# Patient Record
Sex: MALE | Race: OTHER | Employment: FULL TIME
[De-identification: names, ages, dates, MRNs, and addresses within clinical notes are randomized per-mention and may not be internally consistent; named-entity substitution may affect disease eponyms.]

---

## 2017-01-10 ENCOUNTER — OFFICE VISIT (OUTPATIENT)
Dept: FAMILY MEDICINE CLINIC | Facility: CLINIC | Age: 57
End: 2017-01-10

## 2017-01-10 VITALS
DIASTOLIC BLOOD PRESSURE: 86 MMHG | RESPIRATION RATE: 17 BRPM | WEIGHT: 300 LBS | HEART RATE: 78 BPM | BODY MASS INDEX: 47.09 KG/M2 | TEMPERATURE: 98.7 F | HEIGHT: 67 IN | SYSTOLIC BLOOD PRESSURE: 132 MMHG | OXYGEN SATURATION: 95 %

## 2017-01-10 DIAGNOSIS — S93.401A SPRAIN OF RIGHT ANKLE, UNSPECIFIED LIGAMENT, INITIAL ENCOUNTER: ICD-10-CM

## 2017-01-10 DIAGNOSIS — S93.601A FOOT SPRAIN, RIGHT, INITIAL ENCOUNTER: ICD-10-CM

## 2017-01-10 DIAGNOSIS — M79.671 RIGHT FOOT PAIN: Primary | ICD-10-CM

## 2017-01-10 DIAGNOSIS — M25.571 ACUTE RIGHT ANKLE PAIN: ICD-10-CM

## 2017-01-10 PROCEDURE — 99213 OFFICE O/P EST LOW 20 MIN: CPT | Performed by: FAMILY MEDICINE

## 2017-01-10 PROCEDURE — 73610 X-RAY EXAM OF ANKLE: CPT | Performed by: FAMILY MEDICINE

## 2017-01-10 ASSESSMENT — ENCOUNTER SYMPTOMS
VOMITING: 0
ABDOMINAL PAIN: 0
SORE THROAT: 0
SINUS PRESSURE: 0
CHEST TIGHTNESS: 0
SHORTNESS OF BREATH: 0
RHINORRHEA: 0
EYE PAIN: 0
BACK PAIN: 0
COUGH: 0
EYE DISCHARGE: 0
NAUSEA: 0
DIARRHEA: 0
COLOR CHANGE: 0

## 2017-01-19 ENCOUNTER — TELEPHONE (OUTPATIENT)
Dept: FAMILY MEDICINE CLINIC | Facility: CLINIC | Age: 57
End: 2017-01-19

## 2017-01-19 DIAGNOSIS — M19.90 ARTHRITIS: Primary | ICD-10-CM

## 2017-01-19 RX ORDER — MELOXICAM 15 MG/1
15 TABLET ORAL DAILY
Qty: 30 TABLET | Refills: 1 | Status: SHIPPED | OUTPATIENT
Start: 2017-01-19 | End: 2021-10-28 | Stop reason: ALTCHOICE

## 2017-12-18 ENCOUNTER — HOSPITAL ENCOUNTER (OUTPATIENT)
Age: 57
Discharge: HOME | End: 2017-12-18
Payer: COMMERCIAL

## 2017-12-18 ENCOUNTER — HOSPITAL ENCOUNTER (OUTPATIENT)
Dept: GENERAL RADIOLOGY | Age: 57
Discharge: HOME | End: 2017-12-18
Payer: COMMERCIAL

## 2017-12-18 DIAGNOSIS — E88.81 INSULIN RESISTANCE: ICD-10-CM

## 2017-12-18 LAB
ABSOLUTE EOS #: 0.13 K/UL (ref 0–0.44)
ABSOLUTE IMMATURE GRANULOCYTE: <0.03 K/UL (ref 0–0.3)
ABSOLUTE LYMPH #: 1.43 K/UL (ref 1.1–3.7)
ABSOLUTE MONO #: 0.43 K/UL (ref 0.1–1.2)
ALBUMIN SERPL-MCNC: 4.3 G/DL (ref 3.5–5.2)
ALBUMIN/GLOBULIN RATIO: 1.3 (ref 1–2.5)
ALP BLD-CCNC: 65 U/L (ref 40–129)
ALT SERPL-CCNC: 41 U/L (ref 5–41)
ANION GAP SERPL CALCULATED.3IONS-SCNC: 9 MMOL/L (ref 9–17)
AST SERPL-CCNC: 36 U/L
BASOPHILS # BLD: 0 % (ref 0–2)
BASOPHILS ABSOLUTE: <0.03 K/UL (ref 0–0.2)
BILIRUB SERPL-MCNC: 0.71 MG/DL (ref 0.3–1.2)
BILIRUBIN URINE: NEGATIVE
BUN BLDV-MCNC: 15 MG/DL (ref 6–20)
BUN/CREAT BLD: ABNORMAL (ref 9–20)
C-PEPTIDE: 9.9 NG/ML (ref 1.1–4.4)
CALCIUM SERPL-MCNC: 9.3 MG/DL (ref 8.6–10.4)
CHLORIDE BLD-SCNC: 101 MMOL/L (ref 98–107)
CHOLESTEROL/HDL RATIO: 4
CHOLESTEROL: 143 MG/DL
CO2: 31 MMOL/L (ref 20–31)
COLOR: YELLOW
COMMENT UA: NORMAL
CREAT SERPL-MCNC: 0.87 MG/DL (ref 0.7–1.2)
CREATININE URINE: 235.4 MG/DL (ref 39–259)
DIFFERENTIAL TYPE: ABNORMAL
EOSINOPHILS RELATIVE PERCENT: 2 % (ref 1–4)
ESTIMATED AVERAGE GLUCOSE: 103 MG/DL
GFR AFRICAN AMERICAN: >60 ML/MIN
GFR NON-AFRICAN AMERICAN: >60 ML/MIN
GFR SERPL CREATININE-BSD FRML MDRD: ABNORMAL ML/MIN/{1.73_M2}
GFR SERPL CREATININE-BSD FRML MDRD: ABNORMAL ML/MIN/{1.73_M2}
GLUCOSE BLD-MCNC: 109 MG/DL (ref 70–99)
GLUCOSE URINE: NEGATIVE
HBA1C MFR BLD: 5.2 % (ref 4–6)
HCT VFR BLD CALC: 45.7 % (ref 40.7–50.3)
HDLC SERPL-MCNC: 36 MG/DL
HEMOGLOBIN: 15.9 G/DL (ref 13–17)
IMMATURE GRANULOCYTES: 0 %
INSULIN COMMENT: NORMAL
INSULIN REFERENCE RANGE:: NORMAL
INSULIN: 69.8 MU/L
IRON SATURATION: 34 % (ref 20–55)
IRON: 104 UG/DL (ref 59–158)
KETONES, URINE: NEGATIVE
LDL CHOLESTEROL: 75 MG/DL (ref 0–130)
LEUKOCYTE ESTERASE, URINE: NEGATIVE
LYMPHOCYTES # BLD: 26 % (ref 24–43)
MCH RBC QN AUTO: 33.7 PG (ref 25.2–33.5)
MCHC RBC AUTO-ENTMCNC: 34.8 G/DL (ref 28.4–34.8)
MCV RBC AUTO: 96.8 FL (ref 82.6–102.9)
MICROALBUMIN/CREAT 24H UR: 42 MG/L
MICROALBUMIN/CREAT UR-RTO: 18 MCG/MG CREAT
MONOCYTES # BLD: 8 % (ref 3–12)
NITRITE, URINE: NEGATIVE
PDW BLD-RTO: 13.2 % (ref 11.8–14.4)
PH UA: 5.5 (ref 5–8)
PLATELET # BLD: 175 K/UL (ref 138–453)
PLATELET ESTIMATE: ABNORMAL
PMV BLD AUTO: 9.7 FL (ref 8.1–13.5)
POTASSIUM SERPL-SCNC: 4.7 MMOL/L (ref 3.7–5.3)
PROSTATE SPECIFIC ANTIGEN: 0.19 UG/L
PROTEIN UA: NEGATIVE
RBC # BLD: 4.72 M/UL (ref 4.21–5.77)
RBC # BLD: ABNORMAL 10*6/UL
SEG NEUTROPHILS: 64 % (ref 36–65)
SEGMENTED NEUTROPHILS ABSOLUTE COUNT: 3.48 K/UL (ref 1.5–8.1)
SODIUM BLD-SCNC: 141 MMOL/L (ref 135–144)
SPECIFIC GRAVITY UA: 1.02 (ref 1–1.03)
T3 FREE: 3.37 PG/ML (ref 2.02–4.43)
THYROXINE, FREE: 0.83 NG/DL (ref 0.93–1.7)
TOTAL IRON BINDING CAPACITY: 310 UG/DL (ref 250–450)
TOTAL PROTEIN: 7.6 G/DL (ref 6.4–8.3)
TRIGL SERPL-MCNC: 161 MG/DL
TSH SERPL DL<=0.05 MIU/L-ACNC: 2.44 MIU/L (ref 0.3–5)
TURBIDITY: CLEAR
UNSATURATED IRON BINDING CAPACITY: 206 UG/DL (ref 112–347)
URINE HGB: NEGATIVE
UROBILINOGEN, URINE: NORMAL
VITAMIN D 25-HYDROXY: 23.8 NG/ML (ref 30–100)
VLDLC SERPL CALC-MCNC: ABNORMAL MG/DL (ref 1–30)
WBC # BLD: 5.5 K/UL (ref 3.5–11.3)
WBC # BLD: ABNORMAL 10*3/UL

## 2017-12-18 PROCEDURE — 84681 ASSAY OF C-PEPTIDE: CPT

## 2017-12-18 PROCEDURE — 80053 COMPREHEN METABOLIC PANEL: CPT

## 2017-12-18 PROCEDURE — 84443 ASSAY THYROID STIM HORMONE: CPT

## 2017-12-18 PROCEDURE — 71020 XR CHEST STANDARD TWO VW: CPT

## 2017-12-18 PROCEDURE — 85025 COMPLETE CBC W/AUTO DIFF WBC: CPT

## 2017-12-18 PROCEDURE — 83540 ASSAY OF IRON: CPT

## 2017-12-18 PROCEDURE — G0103 PSA SCREENING: HCPCS

## 2017-12-18 PROCEDURE — 82306 VITAMIN D 25 HYDROXY: CPT

## 2017-12-18 PROCEDURE — 83036 HEMOGLOBIN GLYCOSYLATED A1C: CPT

## 2017-12-18 PROCEDURE — 83525 ASSAY OF INSULIN: CPT

## 2017-12-18 PROCEDURE — 82570 ASSAY OF URINE CREATININE: CPT

## 2017-12-18 PROCEDURE — 84481 FREE ASSAY (FT-3): CPT

## 2017-12-18 PROCEDURE — 80061 LIPID PANEL: CPT

## 2017-12-18 PROCEDURE — 81003 URINALYSIS AUTO W/O SCOPE: CPT

## 2017-12-18 PROCEDURE — 84439 ASSAY OF FREE THYROXINE: CPT

## 2017-12-18 PROCEDURE — 36415 COLL VENOUS BLD VENIPUNCTURE: CPT

## 2017-12-18 PROCEDURE — 83550 IRON BINDING TEST: CPT

## 2017-12-18 PROCEDURE — 82043 UR ALBUMIN QUANTITATIVE: CPT

## 2018-01-15 ENCOUNTER — HOSPITAL ENCOUNTER (OUTPATIENT)
Dept: CT IMAGING | Age: 58
Discharge: HOME | End: 2018-01-15
Payer: COMMERCIAL

## 2018-01-15 DIAGNOSIS — R93.89 ABNORMAL RADIOLOGICAL FINDINGS IN SKIN AND SUBCUTANEOUS TISSUE: ICD-10-CM

## 2018-01-15 DIAGNOSIS — J21.8 ACUTE BRONCHIOLITIS DUE TO OTHER SPECIFIED ORGANISMS: ICD-10-CM

## 2018-01-15 PROCEDURE — 71250 CT THORAX DX C-: CPT

## 2019-02-16 ENCOUNTER — HOSPITAL ENCOUNTER (OUTPATIENT)
Age: 59
Discharge: HOME | End: 2019-02-18
Payer: COMMERCIAL

## 2019-02-16 ENCOUNTER — HOSPITAL ENCOUNTER (OUTPATIENT)
Dept: GENERAL RADIOLOGY | Age: 59
Discharge: HOME | End: 2019-02-18
Payer: COMMERCIAL

## 2019-02-16 ENCOUNTER — HOSPITAL ENCOUNTER (OUTPATIENT)
Age: 59
Discharge: HOME | End: 2019-02-16
Payer: COMMERCIAL

## 2019-02-16 DIAGNOSIS — M54.30 SCIATICA, UNSPECIFIED LATERALITY: ICD-10-CM

## 2019-02-16 LAB
-: ABNORMAL
ABSOLUTE EOS #: 0.14 K/UL (ref 0–0.44)
ABSOLUTE IMMATURE GRANULOCYTE: <0.03 K/UL (ref 0–0.3)
ABSOLUTE LYMPH #: 1.39 K/UL (ref 1.1–3.7)
ABSOLUTE MONO #: 0.6 K/UL (ref 0.1–1.2)
ALBUMIN SERPL-MCNC: 4.3 G/DL (ref 3.5–5.2)
ALBUMIN/GLOBULIN RATIO: 1.4 (ref 1–2.5)
ALP BLD-CCNC: 59 U/L (ref 40–129)
ALT SERPL-CCNC: 30 U/L (ref 5–41)
AMORPHOUS: ABNORMAL
ANION GAP SERPL CALCULATED.3IONS-SCNC: 13 MMOL/L (ref 9–17)
AST SERPL-CCNC: 28 U/L
BACTERIA: ABNORMAL
BASOPHILS # BLD: 0 % (ref 0–2)
BASOPHILS ABSOLUTE: <0.03 K/UL (ref 0–0.2)
BILIRUB SERPL-MCNC: 1.02 MG/DL (ref 0.3–1.2)
BILIRUBIN URINE: NEGATIVE
BUN BLDV-MCNC: 18 MG/DL (ref 6–20)
BUN/CREAT BLD: ABNORMAL (ref 9–20)
C-PEPTIDE: 6.9 NG/ML (ref 1.1–4.4)
CALCIUM SERPL-MCNC: 8.9 MG/DL (ref 8.6–10.4)
CASTS UA: ABNORMAL /LPF (ref 0–8)
CHLORIDE BLD-SCNC: 105 MMOL/L (ref 98–107)
CHOLESTEROL/HDL RATIO: 4.5
CHOLESTEROL: 150 MG/DL
CO2: 23 MMOL/L (ref 20–31)
COLOR: ABNORMAL
COMMENT UA: ABNORMAL
CREAT SERPL-MCNC: 0.82 MG/DL (ref 0.7–1.2)
CRYSTALS, UA: ABNORMAL /HPF
DIFFERENTIAL TYPE: ABNORMAL
EOSINOPHILS RELATIVE PERCENT: 2 % (ref 1–4)
EPITHELIAL CELLS UA: ABNORMAL /HPF (ref 0–5)
GFR AFRICAN AMERICAN: >60 ML/MIN
GFR NON-AFRICAN AMERICAN: >60 ML/MIN
GFR SERPL CREATININE-BSD FRML MDRD: ABNORMAL ML/MIN/{1.73_M2}
GFR SERPL CREATININE-BSD FRML MDRD: ABNORMAL ML/MIN/{1.73_M2}
GLUCOSE BLD-MCNC: 100 MG/DL (ref 70–99)
GLUCOSE URINE: NEGATIVE
HCT VFR BLD CALC: 45.9 % (ref 40.7–50.3)
HDLC SERPL-MCNC: 33 MG/DL
HEMOGLOBIN: 16.5 G/DL (ref 13–17)
IMMATURE GRANULOCYTES: 0 %
INSULIN COMMENT: NORMAL
INSULIN REFERENCE RANGE:: NORMAL
INSULIN: 37.3 MU/L
KETONES, URINE: NEGATIVE
LDL CHOLESTEROL: 92 MG/DL (ref 0–130)
LEUKOCYTE ESTERASE, URINE: ABNORMAL
LYMPHOCYTES # BLD: 23 % (ref 24–43)
MAGNESIUM: 2.2 MG/DL (ref 1.6–2.6)
MCH RBC QN AUTO: 34.3 PG (ref 25.2–33.5)
MCHC RBC AUTO-ENTMCNC: 35.9 G/DL (ref 28.4–34.8)
MCV RBC AUTO: 95.4 FL (ref 82.6–102.9)
MONOCYTES # BLD: 10 % (ref 3–12)
MUCUS: ABNORMAL
MYOGLOBIN: 109 NG/ML (ref 28–72)
NITRITE, URINE: POSITIVE
NRBC AUTOMATED: 0 PER 100 WBC
OTHER OBSERVATIONS UA: ABNORMAL
PDW BLD-RTO: 13.3 % (ref 11.8–14.4)
PH UA: 5 (ref 5–8)
PLATELET # BLD: 175 K/UL (ref 138–453)
PLATELET ESTIMATE: ABNORMAL
PMV BLD AUTO: 9.8 FL (ref 8.1–13.5)
POTASSIUM SERPL-SCNC: 4 MMOL/L (ref 3.7–5.3)
PROSTATE SPECIFIC ANTIGEN: 0.27 UG/L
PROTEIN UA: ABNORMAL
RBC # BLD: 4.81 M/UL (ref 4.21–5.77)
RBC # BLD: ABNORMAL 10*6/UL
RBC UA: ABNORMAL /HPF (ref 0–4)
RENAL EPITHELIAL, UA: ABNORMAL /HPF
SEG NEUTROPHILS: 65 % (ref 36–65)
SEGMENTED NEUTROPHILS ABSOLUTE COUNT: 3.8 K/UL (ref 1.5–8.1)
SODIUM BLD-SCNC: 141 MMOL/L (ref 135–144)
SPECIFIC GRAVITY UA: 1.03 (ref 1–1.03)
T3 FREE: 3.69 PG/ML (ref 2.02–4.43)
THYROXINE, FREE: 0.98 NG/DL (ref 0.93–1.7)
TOTAL CK: 347 U/L (ref 39–308)
TOTAL PROTEIN: 7.3 G/DL (ref 6.4–8.3)
TRICHOMONAS: ABNORMAL
TRIGL SERPL-MCNC: 126 MG/DL
TSH SERPL DL<=0.05 MIU/L-ACNC: 3.77 MIU/L (ref 0.3–5)
TURBIDITY: ABNORMAL
URINE HGB: ABNORMAL
UROBILINOGEN, URINE: NORMAL
VITAMIN D 25-HYDROXY: 19.4 NG/ML (ref 30–100)
VLDLC SERPL CALC-MCNC: ABNORMAL MG/DL (ref 1–30)
WBC # BLD: 6 K/UL (ref 3.5–11.3)
WBC # BLD: ABNORMAL 10*3/UL
WBC UA: ABNORMAL /HPF (ref 0–5)
YEAST: ABNORMAL

## 2019-02-16 PROCEDURE — 84439 ASSAY OF FREE THYROXINE: CPT

## 2019-02-16 PROCEDURE — 80061 LIPID PANEL: CPT

## 2019-02-16 PROCEDURE — 87186 SC STD MICRODIL/AGAR DIL: CPT

## 2019-02-16 PROCEDURE — 72100 X-RAY EXAM L-S SPINE 2/3 VWS: CPT

## 2019-02-16 PROCEDURE — 83735 ASSAY OF MAGNESIUM: CPT

## 2019-02-16 PROCEDURE — 82550 ASSAY OF CK (CPK): CPT

## 2019-02-16 PROCEDURE — 82306 VITAMIN D 25 HYDROXY: CPT

## 2019-02-16 PROCEDURE — 36415 COLL VENOUS BLD VENIPUNCTURE: CPT

## 2019-02-16 PROCEDURE — 84481 FREE ASSAY (FT-3): CPT

## 2019-02-16 PROCEDURE — 83525 ASSAY OF INSULIN: CPT

## 2019-02-16 PROCEDURE — 84153 ASSAY OF PSA TOTAL: CPT

## 2019-02-16 PROCEDURE — 80053 COMPREHEN METABOLIC PANEL: CPT

## 2019-02-16 PROCEDURE — 87077 CULTURE AEROBIC IDENTIFY: CPT

## 2019-02-16 PROCEDURE — 83036 HEMOGLOBIN GLYCOSYLATED A1C: CPT

## 2019-02-16 PROCEDURE — 84681 ASSAY OF C-PEPTIDE: CPT

## 2019-02-16 PROCEDURE — 85025 COMPLETE CBC W/AUTO DIFF WBC: CPT

## 2019-02-16 PROCEDURE — 83874 ASSAY OF MYOGLOBIN: CPT

## 2019-02-16 PROCEDURE — 87086 URINE CULTURE/COLONY COUNT: CPT

## 2019-02-16 PROCEDURE — 84443 ASSAY THYROID STIM HORMONE: CPT

## 2019-02-16 PROCEDURE — 81001 URINALYSIS AUTO W/SCOPE: CPT

## 2019-02-17 LAB
CULTURE: ABNORMAL
ESTIMATED AVERAGE GLUCOSE: 105 MG/DL
HBA1C MFR BLD: 5.3 % (ref 4–6)
Lab: ABNORMAL
SPECIMEN DESCRIPTION: ABNORMAL

## 2019-02-22 ENCOUNTER — HOSPITAL ENCOUNTER (OUTPATIENT)
Dept: NON INVASIVE DIAGNOSTICS | Age: 59
Discharge: HOME | End: 2019-02-22
Payer: COMMERCIAL

## 2019-02-22 ENCOUNTER — HOSPITAL ENCOUNTER (OUTPATIENT)
Dept: NUCLEAR MEDICINE | Age: 59
Discharge: HOME | End: 2019-02-24
Payer: COMMERCIAL

## 2019-02-22 DIAGNOSIS — R07.2 PRECORDIAL CHEST PAIN: ICD-10-CM

## 2019-02-22 LAB
LV EF: 49 %
LVEF MODALITY: NORMAL

## 2019-02-22 PROCEDURE — 6360000002 HC RX W HCPCS: Performed by: FAMILY MEDICINE

## 2019-02-22 PROCEDURE — 93017 CV STRESS TEST TRACING ONLY: CPT | Performed by: NURSE PRACTITIONER

## 2019-02-22 PROCEDURE — 3430000000 HC RX DIAGNOSTIC RADIOPHARMACEUTICAL: Performed by: FAMILY MEDICINE

## 2019-02-22 PROCEDURE — 2580000003 HC RX 258: Performed by: FAMILY MEDICINE

## 2019-02-22 PROCEDURE — A9500 TC99M SESTAMIBI: HCPCS | Performed by: FAMILY MEDICINE

## 2019-02-22 PROCEDURE — 78452 HT MUSCLE IMAGE SPECT MULT: CPT

## 2019-02-22 RX ORDER — NITROGLYCERIN 0.4 MG/1
0.4 TABLET SUBLINGUAL EVERY 5 MIN PRN
Status: DISCONTINUED | OUTPATIENT
Start: 2019-02-22 | End: 2019-02-22

## 2019-02-22 RX ORDER — SODIUM CHLORIDE 0.9 % (FLUSH) 0.9 %
10 SYRINGE (ML) INJECTION PRN
Status: DISCONTINUED | OUTPATIENT
Start: 2019-02-22 | End: 2019-02-22

## 2019-02-22 RX ORDER — SODIUM CHLORIDE 9 MG/ML
INJECTION, SOLUTION INTRAVENOUS ONCE
Status: DISCONTINUED | OUTPATIENT
Start: 2019-02-22 | End: 2019-02-22

## 2019-02-22 RX ORDER — METOPROLOL TARTRATE 5 MG/5ML
2.5 INJECTION INTRAVENOUS PRN
Status: DISCONTINUED | OUTPATIENT
Start: 2019-02-22 | End: 2019-02-22

## 2019-02-22 RX ORDER — SODIUM CHLORIDE 0.9 % (FLUSH) 0.9 %
10 SYRINGE (ML) INJECTION PRN
Status: DISCONTINUED | OUTPATIENT
Start: 2019-02-22 | End: 2019-02-25 | Stop reason: HOSPADM

## 2019-02-22 RX ADMIN — TETRAKIS(2-METHOXYISOBUTYLISOCYANIDE)COPPER(I) TETRAFLUOROBORATE 43 MILLICURIE: 1 INJECTION, POWDER, LYOPHILIZED, FOR SOLUTION INTRAVENOUS at 10:41

## 2019-02-22 RX ADMIN — SODIUM CHLORIDE, PRESERVATIVE FREE 10 ML: 5 INJECTION INTRAVENOUS at 08:35

## 2019-02-22 RX ADMIN — Medication 10 ML: at 10:31

## 2019-02-22 RX ADMIN — REGADENOSON 0.4 MG: 0.08 INJECTION, SOLUTION INTRAVENOUS at 10:41

## 2019-02-22 RX ADMIN — TETRAKIS(2-METHOXYISOBUTYLISOCYANIDE)COPPER(I) TETRAFLUOROBORATE 18 MILLICURIE: 1 INJECTION, POWDER, LYOPHILIZED, FOR SOLUTION INTRAVENOUS at 10:41

## 2019-02-22 RX ADMIN — SODIUM CHLORIDE, PRESERVATIVE FREE 10 ML: 5 INJECTION INTRAVENOUS at 10:41

## 2019-03-08 ENCOUNTER — HOSPITAL ENCOUNTER (OUTPATIENT)
Dept: MRI IMAGING | Facility: CLINIC | Age: 59
Discharge: HOME | End: 2019-03-10
Payer: COMMERCIAL

## 2019-03-08 DIAGNOSIS — M43.16 SPONDYLOLISTHESIS AT L4-L5 LEVEL: ICD-10-CM

## 2019-03-08 PROCEDURE — 72148 MRI LUMBAR SPINE W/O DYE: CPT

## 2019-03-10 ENCOUNTER — HOSPITAL ENCOUNTER (OUTPATIENT)
Age: 59
Discharge: HOME | End: 2019-03-10
Payer: COMMERCIAL

## 2019-03-10 LAB
ABSOLUTE EOS #: 0.15 K/UL (ref 0–0.44)
ABSOLUTE IMMATURE GRANULOCYTE: <0.03 K/UL (ref 0–0.3)
ABSOLUTE LYMPH #: 1.41 K/UL (ref 1.1–3.7)
ABSOLUTE MONO #: 0.63 K/UL (ref 0.1–1.2)
ANION GAP SERPL CALCULATED.3IONS-SCNC: 9 MMOL/L (ref 9–17)
BASOPHILS # BLD: 0 % (ref 0–2)
BASOPHILS ABSOLUTE: <0.03 K/UL (ref 0–0.2)
BUN BLDV-MCNC: 12 MG/DL (ref 6–20)
BUN/CREAT BLD: ABNORMAL (ref 9–20)
CALCIUM SERPL-MCNC: 9.1 MG/DL (ref 8.6–10.4)
CHLORIDE BLD-SCNC: 102 MMOL/L (ref 98–107)
CO2: 26 MMOL/L (ref 20–31)
CREAT SERPL-MCNC: 0.87 MG/DL (ref 0.7–1.2)
DIFFERENTIAL TYPE: ABNORMAL
EOSINOPHILS RELATIVE PERCENT: 2 % (ref 1–4)
GFR AFRICAN AMERICAN: >60 ML/MIN
GFR NON-AFRICAN AMERICAN: >60 ML/MIN
GFR SERPL CREATININE-BSD FRML MDRD: ABNORMAL ML/MIN/{1.73_M2}
GFR SERPL CREATININE-BSD FRML MDRD: ABNORMAL ML/MIN/{1.73_M2}
GLUCOSE BLD-MCNC: 120 MG/DL (ref 70–99)
HCT VFR BLD CALC: 47 % (ref 40.7–50.3)
HEMOGLOBIN: 16.3 G/DL (ref 13–17)
IMMATURE GRANULOCYTES: 0 %
INR BLD: 1
LYMPHOCYTES # BLD: 23 % (ref 24–43)
MCH RBC QN AUTO: 33.8 PG (ref 25.2–33.5)
MCHC RBC AUTO-ENTMCNC: 34.7 G/DL (ref 28.4–34.8)
MCV RBC AUTO: 97.5 FL (ref 82.6–102.9)
MONOCYTES # BLD: 10 % (ref 3–12)
NRBC AUTOMATED: 0 PER 100 WBC
PDW BLD-RTO: 13.4 % (ref 11.8–14.4)
PLATELET # BLD: 191 K/UL (ref 138–453)
PLATELET ESTIMATE: ABNORMAL
PMV BLD AUTO: 10.1 FL (ref 8.1–13.5)
POTASSIUM SERPL-SCNC: 5 MMOL/L (ref 3.7–5.3)
PROTHROMBIN TIME: 10.5 SEC (ref 9–12)
RBC # BLD: 4.82 M/UL (ref 4.21–5.77)
RBC # BLD: ABNORMAL 10*6/UL
SEG NEUTROPHILS: 65 % (ref 36–65)
SEGMENTED NEUTROPHILS ABSOLUTE COUNT: 3.99 K/UL (ref 1.5–8.1)
SODIUM BLD-SCNC: 137 MMOL/L (ref 135–144)
WBC # BLD: 6.2 K/UL (ref 3.5–11.3)
WBC # BLD: ABNORMAL 10*3/UL

## 2019-03-10 PROCEDURE — 36415 COLL VENOUS BLD VENIPUNCTURE: CPT

## 2019-03-10 PROCEDURE — 80048 BASIC METABOLIC PNL TOTAL CA: CPT

## 2019-03-10 PROCEDURE — 85610 PROTHROMBIN TIME: CPT

## 2019-03-10 PROCEDURE — 85025 COMPLETE CBC W/AUTO DIFF WBC: CPT

## 2019-03-14 RX ORDER — LISINOPRIL 40 MG/1
40 TABLET ORAL DAILY
COMMUNITY

## 2019-03-15 ENCOUNTER — HOSPITAL ENCOUNTER (OUTPATIENT)
Dept: CARDIAC CATH/INVASIVE PROCEDURES | Age: 59
Discharge: HOME | End: 2019-03-15
Attending: INTERNAL MEDICINE | Admitting: INTERNAL MEDICINE
Payer: COMMERCIAL

## 2019-03-15 VITALS
OXYGEN SATURATION: 93 % | SYSTOLIC BLOOD PRESSURE: 132 MMHG | HEIGHT: 67 IN | HEART RATE: 70 BPM | BODY MASS INDEX: 48.03 KG/M2 | TEMPERATURE: 97.9 F | WEIGHT: 306 LBS | RESPIRATION RATE: 16 BRPM | DIASTOLIC BLOOD PRESSURE: 62 MMHG

## 2019-03-15 DIAGNOSIS — R94.39 ABNORMAL STRESS TEST: ICD-10-CM

## 2019-03-15 LAB — GLUCOSE BLD-MCNC: 102 MG/DL (ref 75–110)

## 2019-03-15 PROCEDURE — 2709999900 HC NON-CHARGEABLE SUPPLY

## 2019-03-15 PROCEDURE — 82947 ASSAY GLUCOSE BLOOD QUANT: CPT

## 2019-03-15 PROCEDURE — 93458 L HRT ARTERY/VENTRICLE ANGIO: CPT | Performed by: INTERNAL MEDICINE

## 2019-03-15 PROCEDURE — 6370000000 HC RX 637 (ALT 250 FOR IP): Performed by: INTERNAL MEDICINE

## 2019-03-15 PROCEDURE — 2580000003 HC RX 258: Performed by: INTERNAL MEDICINE

## 2019-03-15 PROCEDURE — C1894 INTRO/SHEATH, NON-LASER: HCPCS

## 2019-03-15 PROCEDURE — 6360000004 HC RX CONTRAST MEDICATION

## 2019-03-15 PROCEDURE — 93567 NJX CAR CTH SPRVLV AORTGRPHY: CPT | Performed by: INTERNAL MEDICINE

## 2019-03-15 PROCEDURE — 6360000002 HC RX W HCPCS

## 2019-03-15 PROCEDURE — 93005 ELECTROCARDIOGRAM TRACING: CPT

## 2019-03-15 PROCEDURE — C1760 CLOSURE DEV, VASC: HCPCS

## 2019-03-15 PROCEDURE — C1725 CATH, TRANSLUMIN NON-LASER: HCPCS

## 2019-03-15 PROCEDURE — 2500000003 HC RX 250 WO HCPCS

## 2019-03-15 RX ORDER — ASPIRIN 81 MG/1
81 TABLET, CHEWABLE ORAL ONCE
Status: COMPLETED | OUTPATIENT
Start: 2019-03-15 | End: 2019-03-15

## 2019-03-15 RX ORDER — ONDANSETRON 2 MG/ML
4 INJECTION INTRAMUSCULAR; INTRAVENOUS EVERY 6 HOURS PRN
Status: CANCELLED | OUTPATIENT
Start: 2019-03-15

## 2019-03-15 RX ORDER — ACETAMINOPHEN 325 MG/1
650 TABLET ORAL EVERY 4 HOURS PRN
Status: CANCELLED | OUTPATIENT
Start: 2019-03-15

## 2019-03-15 RX ORDER — SODIUM CHLORIDE 0.9 % (FLUSH) 0.9 %
10 SYRINGE (ML) INJECTION EVERY 12 HOURS SCHEDULED
Status: CANCELLED | OUTPATIENT
Start: 2019-03-15

## 2019-03-15 RX ORDER — SODIUM CHLORIDE 9 MG/ML
INJECTION, SOLUTION INTRAVENOUS CONTINUOUS
Status: CANCELLED | OUTPATIENT
Start: 2019-03-15 | End: 2019-03-15

## 2019-03-15 RX ORDER — SODIUM CHLORIDE 0.9 % (FLUSH) 0.9 %
10 SYRINGE (ML) INJECTION PRN
Status: CANCELLED | OUTPATIENT
Start: 2019-03-15

## 2019-03-15 RX ORDER — SODIUM CHLORIDE 9 MG/ML
INJECTION, SOLUTION INTRAVENOUS CONTINUOUS
Status: DISCONTINUED | OUTPATIENT
Start: 2019-03-15 | End: 2019-03-15 | Stop reason: HOSPADM

## 2019-03-15 RX ADMIN — ASPIRIN 81 MG 81 MG: 81 TABLET ORAL at 07:26

## 2019-03-15 RX ADMIN — SODIUM CHLORIDE: 9 INJECTION, SOLUTION INTRAVENOUS at 07:25

## 2019-03-15 ASSESSMENT — PAIN - FUNCTIONAL ASSESSMENT: PAIN_FUNCTIONAL_ASSESSMENT: 0-10

## 2019-03-16 LAB
EKG ATRIAL RATE: 79 BPM
EKG P AXIS: 29 DEGREES
EKG P-R INTERVAL: 146 MS
EKG Q-T INTERVAL: 362 MS
EKG QRS DURATION: 100 MS
EKG QTC CALCULATION (BAZETT): 415 MS
EKG R AXIS: -42 DEGREES
EKG T AXIS: 123 DEGREES
EKG VENTRICULAR RATE: 79 BPM

## 2019-03-27 ENCOUNTER — HOSPITAL ENCOUNTER (OUTPATIENT)
Age: 59
Discharge: HOME | End: 2019-03-27
Payer: COMMERCIAL

## 2019-03-27 LAB
ANION GAP SERPL CALCULATED.3IONS-SCNC: 11 MMOL/L (ref 9–17)
BUN BLDV-MCNC: 27 MG/DL (ref 6–20)
BUN/CREAT BLD: ABNORMAL (ref 9–20)
CALCIUM SERPL-MCNC: 9.1 MG/DL (ref 8.6–10.4)
CHLORIDE BLD-SCNC: 98 MMOL/L (ref 98–107)
CO2: 30 MMOL/L (ref 20–31)
CREAT SERPL-MCNC: 1.06 MG/DL (ref 0.7–1.2)
GFR AFRICAN AMERICAN: >60 ML/MIN
GFR NON-AFRICAN AMERICAN: >60 ML/MIN
GFR SERPL CREATININE-BSD FRML MDRD: ABNORMAL ML/MIN/{1.73_M2}
GFR SERPL CREATININE-BSD FRML MDRD: ABNORMAL ML/MIN/{1.73_M2}
GLUCOSE BLD-MCNC: 106 MG/DL (ref 70–99)
POTASSIUM SERPL-SCNC: 4.3 MMOL/L (ref 3.7–5.3)
SODIUM BLD-SCNC: 139 MMOL/L (ref 135–144)

## 2019-03-27 PROCEDURE — 80048 BASIC METABOLIC PNL TOTAL CA: CPT

## 2019-03-27 PROCEDURE — 36415 COLL VENOUS BLD VENIPUNCTURE: CPT

## 2019-03-29 ENCOUNTER — OFFICE VISIT (OUTPATIENT)
Dept: UROLOGY | Age: 59
End: 2019-03-29
Payer: COMMERCIAL

## 2019-03-29 ENCOUNTER — HOSPITAL ENCOUNTER (OUTPATIENT)
Age: 59
Setting detail: SPECIMEN
Discharge: HOME | End: 2019-03-29
Payer: COMMERCIAL

## 2019-03-29 VITALS
SYSTOLIC BLOOD PRESSURE: 119 MMHG | DIASTOLIC BLOOD PRESSURE: 72 MMHG | WEIGHT: 300 LBS | BODY MASS INDEX: 47.09 KG/M2 | RESPIRATION RATE: 20 BRPM | HEART RATE: 83 BPM | HEIGHT: 67 IN

## 2019-03-29 DIAGNOSIS — Z12.5 PROSTATE CANCER SCREENING: ICD-10-CM

## 2019-03-29 DIAGNOSIS — N28.1 RENAL CYST: ICD-10-CM

## 2019-03-29 DIAGNOSIS — R31.29 MICROHEMATURIA: Primary | ICD-10-CM

## 2019-03-29 DIAGNOSIS — Z87.440 HISTORY OF UTI: ICD-10-CM

## 2019-03-29 DIAGNOSIS — R35.0 URINARY FREQUENCY: ICD-10-CM

## 2019-03-29 LAB
-: NORMAL
AMORPHOUS: NORMAL
BACTERIA: NORMAL
BILIRUBIN URINE: NEGATIVE
BILIRUBIN, POC: NEGATIVE
BLOOD URINE, POC: NEGATIVE
CASTS UA: NORMAL /LPF (ref 0–2)
CLARITY, POC: CLEAR
COLOR, POC: YELLOW
COLOR: YELLOW
CRYSTALS, UA: NORMAL /HPF
EPITHELIAL CELLS UA: NORMAL /HPF (ref 0–5)
GLUCOSE URINE, POC: NEGATIVE
GLUCOSE URINE: NEGATIVE
KETONES, POC: NEGATIVE
KETONES, URINE: NEGATIVE
LEUKOCYTE EST, POC: NEGATIVE
LEUKOCYTE ESTERASE, URINE: NEGATIVE
MUCUS: NORMAL
NITRITE, POC: NEGATIVE
NITRITE, URINE: NEGATIVE
OTHER OBSERVATIONS UA: NORMAL
PH UA: 6 (ref 5–8)
PH, POC: 6.5
PROTEIN UA: NEGATIVE
PROTEIN, POC: NEGATIVE
RBC UA: NORMAL /HPF (ref 0–2)
RENAL EPITHELIAL, UA: NORMAL /HPF
SPECIFIC GRAVITY UA: 1.02 (ref 1–1.03)
SPECIFIC GRAVITY, POC: 1.02
TRICHOMONAS: NORMAL
TURBIDITY: CLEAR
URINE HGB: NEGATIVE
UROBILINOGEN, POC: 0.2
UROBILINOGEN, URINE: NORMAL
WBC UA: NORMAL /HPF (ref 0–5)
YEAST: NORMAL

## 2019-03-29 PROCEDURE — 99204 OFFICE O/P NEW MOD 45 MIN: CPT | Performed by: UROLOGY

## 2019-03-29 PROCEDURE — 81002 URINALYSIS NONAUTO W/O SCOPE: CPT | Performed by: UROLOGY

## 2019-04-03 ENCOUNTER — HOSPITAL ENCOUNTER (OUTPATIENT)
Dept: CT IMAGING | Age: 59
Discharge: HOME | End: 2019-04-05
Payer: COMMERCIAL

## 2019-04-03 DIAGNOSIS — I71.21 ASCENDING AORTIC ANEURYSM: ICD-10-CM

## 2019-04-03 PROCEDURE — 6360000004 HC RX CONTRAST MEDICATION: Performed by: INTERNAL MEDICINE

## 2019-04-03 PROCEDURE — 71260 CT THORAX DX C+: CPT

## 2019-04-03 RX ADMIN — IOVERSOL 75 ML: 741 INJECTION INTRA-ARTERIAL; INTRAVENOUS at 07:41

## 2019-06-25 ENCOUNTER — EMPLOYEE WELLNESS (OUTPATIENT)
Dept: OTHER | Age: 59
End: 2019-06-25

## 2019-06-25 LAB
CHOLESTEROL/HDL RATIO: 4.6
CHOLESTEROL: 153 MG/DL
GLUCOSE BLD-MCNC: 113 MG/DL (ref 70–99)
HDLC SERPL-MCNC: 33 MG/DL
LDL CHOLESTEROL: 66 MG/DL (ref 0–130)
PATIENT FASTING?: ABNORMAL
TRIGL SERPL-MCNC: 271 MG/DL
VLDLC SERPL CALC-MCNC: ABNORMAL MG/DL (ref 1–30)

## 2019-07-01 VITALS — WEIGHT: 299 LBS | BODY MASS INDEX: 46.83 KG/M2

## 2019-07-03 ENCOUNTER — TELEPHONE (OUTPATIENT)
Dept: UROLOGY | Age: 59
End: 2019-07-03

## 2020-01-09 ENCOUNTER — OFFICE VISIT (OUTPATIENT)
Dept: PODIATRY | Age: 60
End: 2020-01-09
Payer: COMMERCIAL

## 2020-01-09 VITALS — WEIGHT: 300 LBS | BODY MASS INDEX: 44.43 KG/M2 | HEIGHT: 69 IN

## 2020-01-09 PROCEDURE — 99203 OFFICE O/P NEW LOW 30 MIN: CPT | Performed by: PODIATRIST

## 2020-01-09 RX ORDER — PREDNISONE 10 MG/1
TABLET ORAL
Qty: 20 TABLET | Refills: 0 | Status: SHIPPED | OUTPATIENT
Start: 2020-01-09 | End: 2022-10-25 | Stop reason: HOSPADM

## 2020-01-09 NOTE — PROGRESS NOTES
[address] [facility] [address] [address] [facility] [address]  Dept: [phone]    NEW PATIENT PROGRESS NOTE  Date of patient's visit: 1/9/2020  Patient's Name:  Elke Jha YOB: 1960            Patient Care Team:  Willy Keys DO as PCP - General  Willy Keys DO as PCP - [facility] Empaneled Provider        Chief Complaint   Patient presents with    New Patient    Foot Pain     left foot         HPI:   Elke Jha is a 64 y.o. male who presents to the office today complaining of left foot pain/bunion. Symptoms began 1 week(s) ago. Patient relates pain is Present. Pain is rated 8 out of 10 and is described as constant. Treatments prior to today's visit include: applied cbd oil. Currently denies F/C/N/V. Pt's primary care physician is Willy Keys DO last seen October 16 2019    No Known Allergies    Past Medical History:   Diagnosis Date    Abnormal EKG     BY HX    Chest pain     Diabetes mellitus ([facility] [address]     Hyperlipidemia     Hypertension     Morbid obesity ([facility] [address]     Sleep apnea     HAD SURGERY       Prior to Admission medications    Medication Sig Start Date End Date Taking?  Authorizing Provider   lisinopril (PRINIVIL;ZESTRIL) 40 MG tablet Take 40 mg by mouth daily   Yes Historical Provider, MD   meloxicam (MOBIC) 15 MG tablet Take 1 tablet by mouth daily 1/19/17  Yes Mindy De Jesus MD   metoprolol (TOPROL-XL) 50 MG XL tablet Take 50 mg by mouth daily   Yes Historical Provider, MD   hydrochlorothiazide (HYDRODIURIL) 25 MG tablet Take 25 mg by mouth daily   Yes Historical Provider, MD   aspirin 325 MG tablet Take 325 mg by mouth daily   Yes Historical Provider, MD       Past Surgical History:   Procedure Laterality Date    CARDIAC SURGERY      CARDIAC CATH  GREATER THAN 5 YRS AGO    CARPAL TUNNEL RELEASE Right     COLONOSCOPY      ELBOW SURGERY Left 4/25/16    lt lateral epicondylectomy    JOINT REPLACEMENT Bilateral

## 2020-01-11 ENCOUNTER — HOSPITAL ENCOUNTER (OUTPATIENT)
Age: 60
Discharge: HOME | End: 2020-01-11
Payer: COMMERCIAL

## 2020-01-11 LAB — URIC ACID: 7.1 MG/DL (ref 3.4–7)

## 2020-01-11 PROCEDURE — 36415 COLL VENOUS BLD VENIPUNCTURE: CPT

## 2020-01-11 PROCEDURE — 84550 ASSAY OF BLOOD/URIC ACID: CPT

## 2020-01-14 RX ORDER — COLCHICINE 0.6 MG/1
0.6 TABLET ORAL DAILY
Qty: 30 TABLET | Refills: 3 | Status: SHIPPED | OUTPATIENT
Start: 2020-01-14 | End: 2021-10-28

## 2020-01-15 RX ORDER — COLCHICINE 0.6 MG/1
0.6 CAPSULE ORAL DAILY
Qty: 30 CAPSULE | Refills: 0 | Status: SHIPPED | OUTPATIENT
Start: 2020-01-15 | End: 2021-10-28

## 2020-01-22 ENCOUNTER — TELEPHONE (OUTPATIENT)
Dept: PODIATRY | Age: 60
End: 2020-01-22

## 2020-01-27 ENCOUNTER — OFFICE VISIT (OUTPATIENT)
Dept: PODIATRY | Age: 60
End: 2020-01-27
Payer: COMMERCIAL

## 2020-01-27 VITALS — HEIGHT: 69 IN | WEIGHT: 300 LBS | RESPIRATION RATE: 16 BRPM | BODY MASS INDEX: 44.43 KG/M2

## 2020-01-27 PROCEDURE — 99213 OFFICE O/P EST LOW 20 MIN: CPT | Performed by: PODIATRIST

## 2020-01-27 RX ORDER — ALLOPURINOL 100 MG/1
100 TABLET ORAL DAILY
Qty: 30 TABLET | Refills: 1 | Status: SHIPPED | OUTPATIENT
Start: 2020-01-27 | End: 2020-04-17 | Stop reason: SDUPTHER

## 2020-01-27 NOTE — PROGRESS NOTES
(HYDRODIURIL) 25 MG tablet Take 25 mg by mouth daily    Historical Provider, MD   aspirin 325 MG tablet Take 325 mg by mouth daily    Historical Provider, MD       Review of Systems    Review of Systems:  History obtained from chart review and the patient  General ROS: negative for - chills, fatigue, fever, night sweats or weight gain  Constitutional: Negative for chills, diaphoresis, fatigue, fever and unexpected weight change. Musculoskeletal: Positive for arthralgias, gait problem and joint swelling. Neurological ROS: negative for - behavioral changes, confusion, headaches or seizures. Negative for weakness and numbness. Dermatological ROS: negative for - mole changes, rash  Cardiovascular: Negative for leg swelling. Gastrointestinal: Negative for constipation, diarrhea, nausea and vomiting. Lower Extremity Physical Examination:     Vitals:   Vitals:    01/27/20 1246   Resp: 16     General: AAO x 3 in NAD. Dermatologic Exam:  Skin lesion/ulceration Absent . Skin No rashes or nodules noted. .       Musculoskeletal:     1st MPJ ROM decreased, Bilateral.  Muscle strength 5/5, Bilateral. Minimal Pain present upon palpation of left 1stMPJ. Medial longitudinal arch, Bilateral WNL.   Ankle ROM WNL,Bilateral.    Dorsally contracted digits absent digits 1-5 Bilateral.     Vascular: DP and PT pulses palpable 2/4, Bilateral.  CFT <3 seconds, Bilateral.  Hair growth present to the level of the digits, Bilateral.  Edema absent, Bilateral.  Varicosities absent, Bilateral. Erythema absent, Bilateral    Neurological: Sensation intact to light touch to level of digits, Bilateral.  Protective sensation intact 10/10 sites via 5.07/10g [place] Monofilament, Bilateral.  negative Tinel's, Bilateral.  negative Valleix sign, Bilateral.      Integument: Warm, dry, supple, Bilateral.  Open lesion absent, Bilateral.  Interdigital maceration absent to web spaces 1-4, Bilateral.  Nails are normal in length, thickness and color 1-5 bilateral.  Fissures absent, Bilateral.       Asessment: Patient is a 64 y.o. male with:   1. Acute gout involving toe of left foot, unspecified cause    2. Edema of lower extremity    3. Pain of left lower extremity        Plan: Patient examined and evaluated. Current condition and treatment options discussed in detail. Advised pt to monitor diet to avoid another gout flare. Discussed possible treatment with allopurinol if there is recurrence of gout flare. Verbal and written instructions given to patient. Contact office with any questions/problems/concerns. No orders of the defined types were placed in this encounter. No orders of the defined types were placed in this encounter.        RTC in PRN   1/27/2020      Electronically signed by Reji Thornton DPM on 1/27/2020 at 1:03 PM  1/27/2020

## 2020-04-17 RX ORDER — ALLOPURINOL 100 MG/1
100 TABLET ORAL DAILY
Qty: 30 TABLET | Refills: 1 | Status: SHIPPED | OUTPATIENT
Start: 2020-04-17 | End: 2022-10-26

## 2020-05-04 ENCOUNTER — HOSPITAL ENCOUNTER (OUTPATIENT)
Age: 60
Discharge: HOME | End: 2020-05-04
Payer: COMMERCIAL

## 2020-05-04 PROCEDURE — U0003 INFECTIOUS AGENT DETECTION BY NUCLEIC ACID (DNA OR RNA); SEVERE ACUTE RESPIRATORY SYNDROME CORONAVIRUS 2 (SARS-COV-2) (CORONAVIRUS DISEASE [COVID-19]), AMPLIFIED PROBE TECHNIQUE, MAKING USE OF HIGH THROUGHPUT TECHNOLOGIES AS DESCRIBED BY CMS-2020-01-R: HCPCS

## 2020-05-06 LAB — SARS-COV-2, NAA: NOT DETECTED

## 2020-05-08 ENCOUNTER — TELEPHONE (OUTPATIENT)
Dept: PRIMARY CARE CLINIC | Age: 60
End: 2020-05-08

## 2021-04-26 ENCOUNTER — APPOINTMENT (OUTPATIENT)
Dept: CT IMAGING | Age: 61
End: 2021-04-26
Payer: COMMERCIAL

## 2021-04-26 ENCOUNTER — APPOINTMENT (OUTPATIENT)
Dept: GENERAL RADIOLOGY | Age: 61
End: 2021-04-26
Payer: COMMERCIAL

## 2021-04-26 ENCOUNTER — HOSPITAL ENCOUNTER (EMERGENCY)
Age: 61
Discharge: HOME | End: 2021-04-26
Attending: EMERGENCY MEDICINE
Payer: COMMERCIAL

## 2021-04-26 VITALS
HEART RATE: 71 BPM | WEIGHT: 300 LBS | RESPIRATION RATE: 15 BRPM | HEIGHT: 69 IN | SYSTOLIC BLOOD PRESSURE: 145 MMHG | BODY MASS INDEX: 44.43 KG/M2 | TEMPERATURE: 97.2 F | DIASTOLIC BLOOD PRESSURE: 110 MMHG | OXYGEN SATURATION: 96 %

## 2021-04-26 DIAGNOSIS — M25.552 LEFT HIP PAIN: Primary | ICD-10-CM

## 2021-04-26 LAB
ABSOLUTE EOS #: 0.13 K/UL (ref 0–0.44)
ABSOLUTE IMMATURE GRANULOCYTE: <0.03 K/UL (ref 0–0.3)
ABSOLUTE LYMPH #: 1.24 K/UL (ref 1.1–3.7)
ABSOLUTE MONO #: 0.57 K/UL (ref 0.1–1.2)
ALBUMIN SERPL-MCNC: 4.6 G/DL (ref 3.5–5.2)
ALBUMIN/GLOBULIN RATIO: 1.5 (ref 1–2.5)
ALP BLD-CCNC: 58 U/L (ref 40–129)
ALT SERPL-CCNC: 28 U/L (ref 5–41)
ANION GAP SERPL CALCULATED.3IONS-SCNC: 10 MMOL/L (ref 9–17)
AST SERPL-CCNC: 30 U/L
BASOPHILS # BLD: 0 % (ref 0–2)
BASOPHILS ABSOLUTE: <0.03 K/UL (ref 0–0.2)
BILIRUB SERPL-MCNC: 0.41 MG/DL (ref 0.3–1.2)
BUN BLDV-MCNC: 13 MG/DL (ref 6–20)
BUN/CREAT BLD: ABNORMAL (ref 9–20)
CALCIUM SERPL-MCNC: 9.4 MG/DL (ref 8.6–10.4)
CHLORIDE BLD-SCNC: 100 MMOL/L (ref 98–107)
CO2: 27 MMOL/L (ref 20–31)
CREAT SERPL-MCNC: 0.83 MG/DL (ref 0.7–1.2)
DIFFERENTIAL TYPE: ABNORMAL
EOSINOPHILS RELATIVE PERCENT: 2 % (ref 1–4)
GFR AFRICAN AMERICAN: >60 ML/MIN
GFR NON-AFRICAN AMERICAN: >60 ML/MIN
GFR SERPL CREATININE-BSD FRML MDRD: ABNORMAL ML/MIN/{1.73_M2}
GFR SERPL CREATININE-BSD FRML MDRD: ABNORMAL ML/MIN/{1.73_M2}
GLUCOSE BLD-MCNC: 101 MG/DL (ref 70–99)
HCT VFR BLD CALC: 43.4 % (ref 40.7–50.3)
HEMOGLOBIN: 15 G/DL (ref 13–17)
IMMATURE GRANULOCYTES: 0 %
LIPASE: 51 U/L (ref 13–60)
LYMPHOCYTES # BLD: 22 % (ref 24–43)
MCH RBC QN AUTO: 34 PG (ref 25.2–33.5)
MCHC RBC AUTO-ENTMCNC: 34.6 G/DL (ref 28.4–34.8)
MCV RBC AUTO: 98.4 FL (ref 82.6–102.9)
MONOCYTES # BLD: 10 % (ref 3–12)
NRBC AUTOMATED: 0 PER 100 WBC
PDW BLD-RTO: 13.4 % (ref 11.8–14.4)
PLATELET # BLD: 189 K/UL (ref 138–453)
PLATELET ESTIMATE: ABNORMAL
PMV BLD AUTO: 9.9 FL (ref 8.1–13.5)
POTASSIUM SERPL-SCNC: 3.8 MMOL/L (ref 3.7–5.3)
RBC # BLD: 4.41 M/UL (ref 4.21–5.77)
RBC # BLD: ABNORMAL 10*6/UL
SEG NEUTROPHILS: 66 % (ref 36–65)
SEGMENTED NEUTROPHILS ABSOLUTE COUNT: 3.63 K/UL (ref 1.5–8.1)
SODIUM BLD-SCNC: 137 MMOL/L (ref 135–144)
TOTAL PROTEIN: 7.6 G/DL (ref 6.4–8.3)
TROPONIN INTERP: NORMAL
TROPONIN INTERP: NORMAL
TROPONIN T: NORMAL NG/ML
TROPONIN T: NORMAL NG/ML
TROPONIN, HIGH SENSITIVITY: 13 NG/L (ref 0–22)
TROPONIN, HIGH SENSITIVITY: 14 NG/L (ref 0–22)
WBC # BLD: 5.6 K/UL (ref 3.5–11.3)
WBC # BLD: ABNORMAL 10*3/UL

## 2021-04-26 PROCEDURE — 93005 ELECTROCARDIOGRAM TRACING: CPT | Performed by: STUDENT IN AN ORGANIZED HEALTH CARE EDUCATION/TRAINING PROGRAM

## 2021-04-26 PROCEDURE — 83690 ASSAY OF LIPASE: CPT

## 2021-04-26 PROCEDURE — 80053 COMPREHEN METABOLIC PANEL: CPT

## 2021-04-26 PROCEDURE — 85025 COMPLETE CBC W/AUTO DIFF WBC: CPT

## 2021-04-26 PROCEDURE — 71046 X-RAY EXAM CHEST 2 VIEWS: CPT

## 2021-04-26 PROCEDURE — 84484 ASSAY OF TROPONIN QUANT: CPT

## 2021-04-26 PROCEDURE — 99284 EMERGENCY DEPT VISIT MOD MDM: CPT

## 2021-04-26 PROCEDURE — 96372 THER/PROPH/DIAG INJ SC/IM: CPT

## 2021-04-26 PROCEDURE — 6360000004 HC RX CONTRAST MEDICATION: Performed by: STUDENT IN AN ORGANIZED HEALTH CARE EDUCATION/TRAINING PROGRAM

## 2021-04-26 PROCEDURE — 74177 CT ABD & PELVIS W/CONTRAST: CPT

## 2021-04-26 PROCEDURE — 6370000000 HC RX 637 (ALT 250 FOR IP): Performed by: STUDENT IN AN ORGANIZED HEALTH CARE EDUCATION/TRAINING PROGRAM

## 2021-04-26 PROCEDURE — 96374 THER/PROPH/DIAG INJ IV PUSH: CPT

## 2021-04-26 PROCEDURE — 6360000002 HC RX W HCPCS: Performed by: STUDENT IN AN ORGANIZED HEALTH CARE EDUCATION/TRAINING PROGRAM

## 2021-04-26 RX ORDER — KETOROLAC TROMETHAMINE 15 MG/ML
15 INJECTION, SOLUTION INTRAMUSCULAR; INTRAVENOUS ONCE
Status: COMPLETED | OUTPATIENT
Start: 2021-04-26 | End: 2021-04-26

## 2021-04-26 RX ORDER — CYCLOBENZAPRINE HCL 10 MG
10 TABLET ORAL 3 TIMES DAILY PRN
Qty: 21 TABLET | Refills: 0 | Status: SHIPPED | OUTPATIENT
Start: 2021-04-26 | End: 2021-05-06

## 2021-04-26 RX ORDER — ORPHENADRINE CITRATE 30 MG/ML
60 INJECTION INTRAMUSCULAR; INTRAVENOUS ONCE
Status: COMPLETED | OUTPATIENT
Start: 2021-04-26 | End: 2021-04-26

## 2021-04-26 RX ORDER — CYCLOBENZAPRINE HCL 10 MG
10 TABLET ORAL ONCE
Status: COMPLETED | OUTPATIENT
Start: 2021-04-26 | End: 2021-04-26

## 2021-04-26 RX ADMIN — CYCLOBENZAPRINE 10 MG: 10 TABLET, FILM COATED ORAL at 15:50

## 2021-04-26 RX ADMIN — ORPHENADRINE CITRATE 60 MG: 30 INJECTION INTRAMUSCULAR; INTRAVENOUS at 19:33

## 2021-04-26 RX ADMIN — KETOROLAC TROMETHAMINE 15 MG: 15 INJECTION, SOLUTION INTRAMUSCULAR; INTRAVENOUS at 18:34

## 2021-04-26 RX ADMIN — IOPAMIDOL 75 ML: 755 INJECTION, SOLUTION INTRAVENOUS at 18:12

## 2021-04-26 ASSESSMENT — PAIN DESCRIPTION - LOCATION
LOCATION: HIP
LOCATION: GROIN

## 2021-04-26 ASSESSMENT — PAIN SCALES - GENERAL: PAINLEVEL_OUTOF10: 10

## 2021-04-26 ASSESSMENT — PAIN DESCRIPTION - ORIENTATION
ORIENTATION: LEFT
ORIENTATION: LEFT

## 2021-04-26 ASSESSMENT — PAIN DESCRIPTION - PAIN TYPE: TYPE: ACUTE PAIN

## 2021-04-26 NOTE — ED PROVIDER NOTES
[facility]  ED  Emergency Department Encounter  EmergencyMedicine Resident     Pt Alejandro Councilman  MRN: [id number]  Lucindagfmindy 1960  Date of evaluation: 4/26/21  PCP:  Dinesh Porter DO    CHIEF COMPLAINT       Chief Complaint   Patient presents with    Groin Injury     pt states lt groin pain x3 weeks    Rectal Bleeding     pt states bright red blood       HISTORY OF PRESENT ILLNESS  (Location/Symptom, Timing/Onset, Context/Setting, Quality, Duration, Modifying Factors, Severity.)      Myriam Knapp is a 64 y.o. male who presents with left groin pain. Patient notes over the past 3 to 4 weeks has been having worsening stabbing/aching pain in his left hip/groin. He notes the pain is worse when he tries to get up and move around. In this time he is also reported having intermittent bloody stools with blood in the trouble. Patient notes that this cramping does not radiate anywhere else is only exacerbated by movement. Patient also does not note having intermittent chest pain in the left upper quadrant for the past few days it is described as aching and nonradiating. Patient strangers, chills, chest pain, cough, nausea/vomiting, abdominal pain, extremity or weakness. Patient notes he has been taking Tylenol ibuprofen for his pain control but nothing today. PAST MEDICAL / SURGICAL / SOCIAL / FAMILY HISTORY      has a past medical history of Abnormal EKG, Chest pain, Diabetes mellitus ([address] Hyperlipidemia, Hypertension, Morbid obesity ([address] and Sleep apnea. has a past surgical history that includes Carpal tunnel release (Right); Knee arthroscopy (Bilateral); [place] tooth extraction; UPPP; joint replacement (Bilateral); Cardiac surgery; Colonoscopy; and Elbow surgery (Left, 4/25/16).       Social History     Socioeconomic History    Marital status:      Spouse name: Not on file    Number of children: Not on file    Years of education: Not on file    Highest education Decrease lasix to 20 mg once a day (from twice a day)  Take extra dose of lasix 20 mg if you notice weight gain > 3lbs in 1 day or 5 lbs in 1 week  Check CMP, Mg at the nursing home in 1 week  Nephrology evaluation  RTC in 1 month   level: Not on file   Occupational History    Not on file   Social Needs    Financial resource strain: Not on file    Food insecurity     Worry: Not on file     Inability: Not on file    Transportation needs     Medical: Not on file     Non-medical: Not on file   Tobacco Use    Smoking status: Never Smoker    Smokeless tobacco: Never Used   Substance and Sexual Activity    Alcohol use: Yes     Comment: occasionally    Drug use: No    Sexual activity: Not on file   Lifestyle    Physical activity     Days per week: Not on file     Minutes per session: Not on file    Stress: Not on file   Relationships    Social connections     Talks on phone: Not on file     Gets together: Not on file     Attends Christianity service: Not on file     Active member of club or organization: Not on file     Attends meetings of clubs or organizations: Not on file     Relationship status: Not on file    Intimate partner violence     Fear of current or ex partner: Not on file     Emotionally abused: Not on file     Physically abused: Not on file     Forced sexual activity: Not on file   Other Topics Concern    Not on file   Social History Narrative    Not on file       Family History   Problem Relation Age of Onset    Heart Disease Mother     Diabetes Mother     Hypertension Mother     Hypertension Father        Allergies:  Patient has no known allergies. Home Medications:  Prior to Admission medications    Medication Sig Start Date End Date Taking?  Authorizing Provider   allopurinol (ZYLOPRIM) 100 MG tablet Take 1 tablet by mouth daily 4/17/20   Amy [place], DPM   colchicine (MITIGARE) 0.6 MG capsule Take 1 capsule by mouth daily 1/15/20   Amy [place], DPM   colchicine (COLCRYS) 0.6 MG tablet Take 1 tablet by mouth daily 1/14/20   Amy [place], DPM   predniSONE (DELTASONE) 10 MG tablet One tab po TID x 3 days, one tab po BID x 3 days, one tab po qd x 3 days, 1/2 tab po qd x 4 days 1/9/20   Amy [place], DPM   lisinopril normal.      Nose: Nose normal.      Mouth/Throat:      Mouth: Mucous membranes are moist.      Pharynx: Oropharynx is clear. Eyes:      General: No scleral icterus. Extraocular Movements: Extraocular movements intact. Conjunctiva/sclera: Conjunctivae normal.      Pupils: Pupils are equal, round, and reactive to light. Neck:      Musculoskeletal: Normal range of motion. Vascular: No JVD. Trachea: No tracheal deviation. Cardiovascular:      Rate and Rhythm: Normal rate and regular rhythm. Pulses: Normal pulses. Heart sounds: Normal heart sounds, S1 normal and S2 normal. No murmur. No friction rub. No gallop. Pulmonary:      Effort: Pulmonary effort is normal. No respiratory distress. Breath sounds: Normal breath sounds. Abdominal:      General: Abdomen is flat. There is no distension. Palpations: Abdomen is soft. Tenderness: There is no abdominal tenderness. There is no guarding or rebound. Hernia: There is no hernia in the left inguinal area or right inguinal area. Comments: Exam limited by extreme body habitus   Genitourinary:     Penis: Circumcised. Testes: Normal. Cremasteric reflex is present. Right: Mass, tenderness, swelling, testicular hydrocele or varicocele not present. Cremasteric reflex is present. Left: Mass, tenderness, swelling, testicular hydrocele or varicocele not present. Cremasteric reflex is present. Epididymis:      Right: Normal.      Left: Normal.      Comments: Exam is limited by extreme habitus. Musculoskeletal: Normal range of motion. General: No swelling or tenderness. Comments: No obvious tenderness elicited when touching the left hip or movement of the left leg. Lymphadenopathy:      Lower Body: No right inguinal adenopathy. No left inguinal adenopathy. Skin:     General: Skin is warm and dry. Capillary Refill: Capillary refill takes less than 2 seconds.    Neurological: Mental Status: He is alert and oriented to person, place, and time. Motor: No abnormal muscle tone.          DIFFERENTIAL  DIAGNOSIS     PLAN (LABS / IMAGING / EKG):  Orders Placed This Encounter   Procedures    CT ABDOMEN PELVIS W IV CONTRAST Additional Contrast? None    XR CHEST (2 VW)    Troponin    CBC Auto Differential    Comprehensive Metabolic Panel w/ Reflex to MG    Lipase    PREVIOUS SPECIMEN    Troponin    EKG 12 Lead    Insert peripheral IV       MEDICATIONS ORDERED:  Orders Placed This Encounter   Medications    cyclobenzaprine (FLEXERIL) tablet 10 mg    ketorolac (TORADOL) injection 15 mg    iopamidol (ISOVUE-370) 76 % injection 75 mL    orphenadrine (NORFLEX) injection 60 mg       DDX: Arthritis, inguinal hernia, hepatitis, pancreatitis, electrolyte abnormality, muscle skeletal pain, ACS/MI, arrhythmia    DIAGNOSTIC RESULTS / EMERGENCY DEPARTMENT COURSE / MDM   LAB RESULTS:  Results for orders placed or performed during the hospital encounter of 04/26/21   Troponin   Result Value Ref Range    Troponin, High Sensitivity 13 0 - 22 ng/L    Troponin T NOT REPORTED <0.03 ng/mL    Troponin Interp NOT REPORTED    CBC Auto Differential   Result Value Ref Range    WBC 5.6 3.5 - 11.3 k/uL    RBC 4.41 4.21 - 5.77 m/uL    Hemoglobin 15.0 13.0 - 17.0 g/dL    Hematocrit 43.4 40.7 - 50.3 %    MCV 98.4 82.6 - 102.9 fL    MCH 34.0 (H) 25.2 - 33.5 pg    MCHC 34.6 28.4 - 34.8 g/dL    RDW 13.4 11.8 - 14.4 %    Platelets 856 493 - 141 k/uL    MPV 9.9 8.1 - 13.5 fL    NRBC Automated 0.0 0.0 per 100 WBC    Differential Type NOT REPORTED     Seg Neutrophils 66 (H) 36 - 65 %    Lymphocytes 22 (L) 24 - 43 %    Monocytes 10 3 - 12 %    Eosinophils % 2 1 - 4 %    Basophils 0 0 - 2 %    Immature Granulocytes 0 0 %    Segs Absolute 3.63 1.50 - 8.10 k/uL    Absolute Lymph # 1.24 1.10 - 3.70 k/uL    Absolute Mono # 0.57 0.10 - 1.20 k/uL    Absolute Eos # 0.13 0.00 - 0.44 k/uL    Basophils Absolute <0.03 0.00 - 0.20 k/uL    Absolute Immature Granulocyte <0.03 0.00 - 0.30 k/uL    WBC Morphology NOT REPORTED     RBC Morphology NOT REPORTED     Platelet Estimate NOT REPORTED    Comprehensive Metabolic Panel w/ Reflex to MG   Result Value Ref Range    Glucose 101 (H) 70 - 99 mg/dL    BUN 13 6 - 20 mg/dL    CREATININE 0.83 0.70 - 1.20 mg/dL    Bun/Cre Ratio NOT REPORTED 9 - 20    Calcium 9.4 8.6 - 10.4 mg/dL    Sodium 137 135 - 144 mmol/L    Potassium 3.8 3.7 - 5.3 mmol/L    Chloride 100 98 - 107 mmol/L    CO2 27 20 - 31 mmol/L    Anion Gap 10 9 - 17 mmol/L    Alkaline Phosphatase 58 40 - 129 U/L    ALT 28 5 - 41 U/L    AST 30 <40 U/L    Total Bilirubin 0.41 0.3 - 1.2 mg/dL    Total Protein 7.6 6.4 - 8.3 g/dL    Albumin 4.6 3.5 - 5.2 g/dL    Albumin/Globulin Ratio 1.5 1.0 - 2.5    GFR Non-African American >60 >60 mL/min    GFR African American >60 >60 mL/min    GFR Comment          GFR Staging NOT REPORTED    Lipase   Result Value Ref Range    Lipase 51 13 - 60 U/L   Troponin   Result Value Ref Range    Troponin, High Sensitivity 14 0 - 22 ng/L    Troponin T NOT REPORTED <0.03 ng/mL    Troponin Interp NOT REPORTED    EKG 12 Lead   Result Value Ref Range    Ventricular Rate 83 BPM    Atrial Rate 83 BPM    P-R Interval 148 ms    QRS Duration 92 ms    Q-T Interval 376 ms    QTc Calculation (Bazett) 441 ms    P Axis 13 degrees    R Axis -52 degrees    T Axis 76 degrees       IMPRESSION: 65-year male presents for left groin/hip pain. Patient appears to be in mild acute stress and nontoxic-appearing. Patient initial vitals are stable nonconcerning aside from slight hypertension of 177/103 but is likely due from pain we will treated for symptom control. No signs respiratory distress. Clear lung sounds bilaterally. RRR with normal S1-2 heart sounds. Patient is morbidly obese and unclear whether or not patient is having any kind of hernia due to his abdominal girth. Hemoccult negative.   Abdomen is otherwise questionably tender in Acuity: Acute Type of Exam: Initial FINDINGS: CT abdomen and pelvis: Unchanged 7 mm and 5 mm right lower lobe pulmonary nodules since prior examination of 2016. The visualized part of the lung bases are otherwise clear. Organs: Fatty liver. 12 mm cyst of midpole left kidney is unchanged. The liver, spleen, adrenal glands, gallbladder, pancreas, kidneys and ureters and pelvic organs including the urinary bladder appear otherwise unremarkable. Peritoneum / Retroperitoneum:  No free air or free fluid is noted. No pathologically enlarged lymphadenopathy. The vasculature do not demonstrate acute abnormality. GI Tract:  No distention or wall thickening. There is diverticulosis without evidence of diverticulitis. Appendix is visualized and appears unremarkable. Bones and Soft Tissues: No fracture. No concerning lytic or sclerotic lesions are noted. There is a fat containing 11 mm umbilical hernia. At L4-L5, grade 1 anterolisthesis and high-grade canal and foraminal stenosis. At L5-S1, severe bilateral neural foraminal narrowing. No acute abnormality within the abdomen and pelvis. Fatty liver. Diverticulosis with no signs of diverticulitis. Fat containing umbilical hernia. Unchanged 7 mm and 5 mm right lower lobe pulmonary nodules since prior examination of 2016, likely suggestive of benign etiology. EKG  EKG Interpretation    Interpreted by emergency department physician    Rhythm: normal sinus   Rate: normal  Axis: left  Ectopy: none  Conduction: normal  ST Segments: normal  T Waves: normal  Q Waves: nonspecific    Clinical Impression: no acute changes and normal sinus rhythm    Hetal Newsome      All EKG's are interpreted by the Emergency Department Physician who either signs or Co-signs this chart in the absence of a cardiologist.    EMERGENCY DEPARTMENT COURSE:  ED Course as of Apr 27 0914   Mon Apr 26, 2021   1634 Chest x-ray there is no acute cardiopulmonary process.     [CS]   9030 CBC is nonconcerning.    [CS]   4548 will repeat   Troponin, High Sensitivity: 14 [CS]   7989 CMP is nonconcerning.    [CS]   1802 Lipase is negative. [CS]   1915 CT pelvis reveals no critical narrowing. There is an 11 mm fat-containing umbilical hernia. And L5-S1 bilateral neural foraminal stenosis spine stenosis. [CS]   1935 stable   Troponin, High Sensitivity: 13 [CS]      ED Course User Index  [CS] Zane Reis DO     Patient was updated on exam results. Patient was agreeable with discharge plan. Patient was educated return precautions. Patient ambulate out of the ER without difficulty. PROCEDURES:  none    CONSULTS:  None    CRITICAL CARE:  Please see attending note    FINAL IMPRESSION      1.  Left hip pain          DISPOSITION / PLAN     DISPOSITION Decision To Discharge 04/26/2021 07:35:06 PM      PATIENT REFERRED TO:  Kunal Burrows DO  [address] [facility] [address]  [phone]    Schedule an appointment as soon as possible for a visit in 3 days  for reevaluation of your left hip pain    [facility] ED  [address]  Go to   If symptoms worsen      DISCHARGE MEDICATIONS:  New Prescriptions    No medications on file       Zane Reis DO  Emergency Medicine Resident    (Please note that portions of thisnote were completed with a voice recognition program.  Efforts were made to edit the dictations but occasionally words are mis-transcribed.)       Zane Reis DO  Resident  04/27/21 0080

## 2021-04-26 NOTE — ED PROVIDER NOTES
[facility] HANDOFF       Handoff taken on the following patient from prior Attending Physician: Dr. Darien Fischer  Pt Name: Therese Aguilar  PCP:  Arielle Barfield DO    Attestation  I was available and discussed any additional care issues that arose and coordinated the management plans with the resident(s) caring for the patient during my duty period. Any areas of disagreement with resident's documentation of care or procedures are noted on the chart. I was personally present for the key portions of any/all procedures during my duty period. I have documented in the chart those procedures where I was not present during the key portions. CHIEF COMPLAINT       Chief Complaint   Patient presents with    Groin Injury     pt states lt groin pain x3 weeks    Rectal Bleeding     pt states bright red blood         CURRENT MEDICATIONS     Previous Medications  Previous Medications    ALLOPURINOL (ZYLOPRIM) 100 MG TABLET    Take 1 tablet by mouth daily    ASPIRIN 325 MG TABLET    Take 325 mg by mouth daily    COLCHICINE (COLCRYS) 0.6 MG TABLET    Take 1 tablet by mouth daily    COLCHICINE (MITIGARE) 0.6 MG CAPSULE    Take 1 capsule by mouth daily    HYDROCHLOROTHIAZIDE (HYDRODIURIL) 25 MG TABLET    Take 25 mg by mouth daily    LISINOPRIL (PRINIVIL;ZESTRIL) 40 MG TABLET    Take 40 mg by mouth daily    MELOXICAM (MOBIC) 15 MG TABLET    Take 1 tablet by mouth daily    METOPROLOL (TOPROL-XL) 50 MG XL TABLET    Take 50 mg by mouth daily    PREDNISONE (DELTASONE) 10 MG TABLET    One tab po TID x 3 days, one tab po BID x 3 days, one tab po qd x 3 days, 1/2 tab po qd x 4 days       Encounter Medications  Orders Placed This Encounter   Medications    cyclobenzaprine (FLEXERIL) tablet 10 mg       ALLERGIES     has No Known Allergies.       RECENT VITALS:   Temp: 97.2 °F (36.2 °C),  Pulse: 86, Resp: 18, BP: (!) 177/103    RADIOLOGY:   XR CHEST (2 VW)   Final Result   No evidence for acute cardiopulmonary pathology. CT ABDOMEN PELVIS W IV CONTRAST Additional Contrast? None    (Results Pending)       LABS:  Labs Reviewed   CBC WITH AUTO DIFFERENTIAL   COMPREHENSIVE METABOLIC PANEL W/ REFLEX TO MG FOR LOW K   LIPASE   TROPONIN   TROPONIN           PLAN/ TASKS OUTSTANDING     Patient 65-year-old male with left groin pain. Pending CT of the abdomen, concern for possible diverticulitis. Patient also had some chest pain recently. Troponin ordered.     (Please note that portions of this note were completed with a voice recognition program.  Efforts were made to edit the dictations but occasionally words are mis-transcribed.)    Thomas Avalos MD,   Attending Emergency Physician       Thomas Avalos MD  04/26/21 8355

## 2021-04-26 NOTE — ED PROVIDER NOTES
[address] ED     Emergency Department     Faculty Attestation        I performed a history and physical examination of the patient and discussed management with the resident. I reviewed the residents note and agree with the documented findings and plan of care. Any areas of disagreement are noted on the chart. I was personally present for the key portions of any procedures. I have documented in the chart those procedures where I was not present during the key portions. I have reviewed the emergency nurses triage note. I agree with the chief complaint, past medical history, past surgical history, allergies, medications, social and family history as documented unless otherwise noted below. For Physician Assistant/ Nurse Practitioner cases/documentation I have personally evaluated this patient and have completed at least one if not all key elements of the E/M (history, physical exam, and MDM). Additional findings are as noted. Vital Signs: BP: (!) 177/103  Pulse: 86  Resp: 18  Temp: 97.2 °F (36.2 °C) SpO2: 98 %  PCP:  Linnie Simmonds,     Pertinent Comments:     Patient is a 45-year-old male history of hypertension diabetes as well as morbid obesity who complains of primarily left groin pain but also some abdominal pain as well as bright red blood per rectum for the last 2 if not 4 weeks. Also occasional left-sided chest pain. On examination patient shows no signs of necrotizing fasciitis or cellulitis of the groin or abdominal area or genitalia. No obvious hernias however body habitus severely limits examination. Assessment/plan: Bright red blood per rectum as well as lower abdominal pain and left inguinal pain with some chest pain intermittently as well. Will obtain brief cardiac work-up but also abdominal laboratories and CT abdomen/pelvis.    Treat symptomatically and reevaluate after    Critical Care  None    This patient was

## 2021-04-27 LAB
EKG ATRIAL RATE: 83 BPM
EKG P AXIS: 13 DEGREES
EKG P-R INTERVAL: 148 MS
EKG Q-T INTERVAL: 376 MS
EKG QRS DURATION: 92 MS
EKG QTC CALCULATION (BAZETT): 441 MS
EKG R AXIS: -52 DEGREES
EKG T AXIS: 76 DEGREES
EKG VENTRICULAR RATE: 83 BPM

## 2021-04-27 PROCEDURE — 93010 ELECTROCARDIOGRAM REPORT: CPT | Performed by: INTERNAL MEDICINE

## 2021-04-27 ASSESSMENT — ENCOUNTER SYMPTOMS
ABDOMINAL PAIN: 0
CONSTIPATION: 0
ANAL BLEEDING: 0
COUGH: 0
DIARRHEA: 0
BLOOD IN STOOL: 1
NAUSEA: 0
VOMITING: 0
PHOTOPHOBIA: 0
SHORTNESS OF BREATH: 0
SORE THROAT: 0

## 2021-06-01 ENCOUNTER — HOSPITAL ENCOUNTER (OUTPATIENT)
Dept: GENERAL RADIOLOGY | Age: 61
Discharge: HOME | End: 2021-06-03
Payer: COMMERCIAL

## 2021-06-01 ENCOUNTER — HOSPITAL ENCOUNTER (OUTPATIENT)
Age: 61
Discharge: HOME | End: 2021-06-03
Payer: COMMERCIAL

## 2021-06-01 DIAGNOSIS — M25.552 LEFT HIP PAIN: ICD-10-CM

## 2021-06-01 PROCEDURE — 73502 X-RAY EXAM HIP UNI 2-3 VIEWS: CPT

## 2021-06-22 ENCOUNTER — TELEPHONE (OUTPATIENT)
Dept: ORTHOPEDIC SURGERY | Age: 61
End: 2021-06-22

## 2021-07-14 DIAGNOSIS — M25.552 LEFT HIP PAIN: Primary | ICD-10-CM

## 2021-07-15 ENCOUNTER — OFFICE VISIT (OUTPATIENT)
Dept: ORTHOPEDIC SURGERY | Age: 61
End: 2021-07-15
Payer: COMMERCIAL

## 2021-07-15 VITALS
BODY MASS INDEX: 45.47 KG/M2 | WEIGHT: 307 LBS | TEMPERATURE: 98.3 F | SYSTOLIC BLOOD PRESSURE: 140 MMHG | DIASTOLIC BLOOD PRESSURE: 89 MMHG | HEIGHT: 69 IN | RESPIRATION RATE: 14 BRPM | HEART RATE: 72 BPM

## 2021-07-15 DIAGNOSIS — M16.12 PRIMARY OSTEOARTHRITIS OF LEFT HIP: Primary | ICD-10-CM

## 2021-07-15 DIAGNOSIS — M25.552 LEFT HIP PAIN: Primary | ICD-10-CM

## 2021-07-15 PROCEDURE — 99203 OFFICE O/P NEW LOW 30 MIN: CPT | Performed by: ORTHOPAEDIC SURGERY

## 2021-07-16 ENCOUNTER — TELEPHONE (OUTPATIENT)
Dept: ORTHOPEDIC SURGERY | Age: 61
End: 2021-07-16

## 2021-07-21 ENCOUNTER — HOSPITAL ENCOUNTER (OUTPATIENT)
Dept: PHYSICAL THERAPY | Facility: CLINIC | Age: 61
Setting detail: THERAPIES SERIES
Discharge: HOME | End: 2021-07-21
Payer: COMMERCIAL

## 2021-07-21 PROCEDURE — 97110 THERAPEUTIC EXERCISES: CPT

## 2021-07-21 PROCEDURE — 97161 PT EVAL LOW COMPLEX 20 MIN: CPT

## 2021-07-21 NOTE — CONSULTS
[] [address] [facility] &  Therapy  [address]  [phone]  F: [phone] [x] [address]  [facility]   [address]  P: [phone]  F: [phone] [] [address] &  Therapy  [address]  P: [phone]  F: [phone] [] [address]  P: [phone]  F: [phone] [] [address]  [facility]   [address]   [place]: [phone]  F: [phone]      Physical Therapy Lower Extremity Evaluation    Date:  2021  Patient: Kaden Naylor  : 1960  MRN: [id number]  Physician: Dr. Stephen Weinstein, Samir Ramirez, CNP Insurance: [employer] (unlimited visits, medical review after 25 vs)   Medical Diagnosis: L hip OA  Rehab Codes: M25.552, R29.3, R26.2, M25.55, M25.65, R26.0  Onset date: 2021  Next Dr's appt.: 21    Subjective:   CC/HPI: 63 y/o male presents to PT clinic with gradually worsening L hip pain x3 months. Pain with ADL's including walking, sit to stand transitions, and yard work. Patient has hx of chronic back pain, but denies hip prior to current episode. Plans to have a L hip arthoplasty 21. He has a history of bilateral knee replacements with Dr. JuanJ Greene. Patient reports L knee achiness.      PMHx: [] Unremarkable [x] Diabetes [x] HTN  [] Pacemaker   [] MI/Heart Problems [] Cancer [] Arthritis [] Other:              [x] Refer to full medical chart  In EPIC          Past Medical History:   Diagnosis Date    Abnormal EKG       BY HX    Chest pain      Diabetes mellitus ([address]      Hyperlipidemia      Hypertension      Morbid obesity ([address]      Sleep apnea       HAD SURGERY        Past Surgical History:   Procedure Laterality Date    CARDIAC SURGERY         CARDIAC CATH  GREATER THAN 5 YRS AGO    [facility] TUNNEL RELEASE Right      COLONOSCOPY        ELBOW SURGERY Left 4/25/16     lt lateral epicondylectomy    JOINT REPLACEMENT Bilateral       KNEES    KNEE ARTHROSCOPY Bilateral       2-3x each    UPPP UVULOPALATOPHARYGOPLASTY        WISDOM TOOTH EXTRACTION         Comorbidities:   [x] Obesity [] Dialysis  [] N/A   [] Asthma/COPD [] Dementia [] Other:   [] Stroke [] Sleep apnea [] Other:   [] Vascular disease [] Rheumatic disease [] Other:     Tests:   [x] X-Ray: L hip   Impression: moderate to severe bilateral osteoarthritis of the left hip     [] MRI:    [] Other:     Medications: [x] Refer to full medical record [] None [] Other:  Allergies:      [x] Refer to full medical record  [] None [] Other:    Function:  Hand Dominance  [] Right  [] Left  Marital Status  Patient lives with  Wife and disabled son    Home type  Equipment Ranch style    Stairs from outside 4 steps with railing    Stairs inside --   [employer]. V's , sterilizer    Job status Full-time   Work Activities/duties  Prolonged standing, lifting, pulling carts    Recreational Activities Walking, traveling, concerts       ADL/IADL Previous level of function Current level of function Who currently assists the patient with task   All ADL's  [x] Independent  [] Assist [x] Independent  [] Assist      Gait Prior level of function Current level of function    [x] Independent  [] Assist [x] Independent  [] Assist   Device: [x] Independent [x] Independent    [] Straight Cane [] Quad cane [] Straight Cane [] Quad cane    [] Standard walker [] Rolling walker   [] 4 wheeled walker [] Standard walker [] Rolling walker   [] 4 wheeled walker    [] Wheelchair [] Wheelchair       Pain present?  Yes    Location L hip    Pain Rating currently 6/10   Pain at worse 9/10 when working   Pain at best 3/10    Description of pain Intermittent shooting and sharp   Constant achiness     Altered Sensation Intact    What makes it worse Positions- standing/walking    What makes it better Nothing   Symptom progression Gradually worsening   Sleep Decreased sleep at baseline- hip is making it worse        Objective:    ROM  ° A/P STRENGTH TESTS (+/-) Left Right Not Tested    Left Right Left Right Ant. Drawer   [x]   Hip Flex wfl wfl 3+ 4 Post. Drawer   [x]   Ext     Lachmans   [x]   ER 30 30   Valgus Stress   [x]   IR 25* 30   Varus Stress   [x]   ABD WFL WFL 4- 4 Svitlanas   [x]   ADD     Apleys Comp.    [x]   Knee Flex wfl wfl 5 5 Apleys Dist.   [x]   Ext -3  5 5 Hip Scouring + - []   Ankle DF 5 5 5 5 ZIAs + - []   PF   5 5 Piriformis   [x]   INV     Michaels   [x]   EVER     Talor Tilt   [x]        Pat-Fem Grind   [x]   * indicates pain with motion      ROM   Lumbar     Flexion WFL   Extension WFL   Rotation L WFL*  R WFL    Sidebend L WFL  R WFL    * pain in hip with motion     OBSERVATION No Deficit Deficit Not Tested Comments   Posture       Forward Head [] [x] []    Rounded Shoulders [] [x] []    Kyphosis [] [x] [] Increased thoracic kyphosis    Lordosis [x] [] []    Lateral Shift [] [] [x]    Scoliosis [] [] [x]    Iliac Crest [] [] [x]    PSIS [] [] [x]    ASIS [] [] [x]    Genu Valgus [x] [] []    Genu Varus [x] [] []    Genu Recurvatum [x] [] []    Pronation [x] [] []    Supination [x] [] []    Leg Length Discrp [] [] [x]    Slumped Sitting [] [x] []    Palpation [] [x] [] Diffuse tenderness to the L lateral hip, anterior hip along the iliopsoas tender to palpation   Sensation [x] [] []    Edema [x] [] []    Neurological [x] [] []    Patellar Mobility [x] [] []    Patellar Orientation [x] [] []    Gait [] [x] [] Analysis: antalgic gait with decreased L stance time with a L lateral trunk lean          Flexibility Normal Left tight Right tight Comments   Hip flexor [] [x] [x] assessed in sidelying, moderate tightness bilat   quad [] [] []    HS [] [x] [x] 90/90 test   R lacking 25 degrees  L lacking 30 degrees   piriformis [] [x] [] See IR ROM    ITB [x] [] []    gastroc [] [x] [x] See DF ROM    Soleus  [] [] []        FUNCTION Normal Difficult Unable   Sitting [] [x] []   Standing [] [x] []   Ambulation [] [x] []   Groom/Dress [] [x] []   Lift/Carry [] [x] []   Stairs [] [x] []   Bending [] [x] []   Squat [] [x] []   Kneel [] [x] []     BALANCE/PROPRIOCEPTION              [x] Not tested   Single leg stance       R                     L                                PAIN   Eyes open                             Sec. Sec                  . []    Eyes closed                          Sec. Sec                  . []         Functional Test: Lower Extremity Functional Scale (LEFS)   Score: 24/80 = 70% functionally impaired     Comments:     Assessment: 63 y/o male patient presents with L hip pain, planning L hip arthoplasty in September. Patient demonstrates painful hip mobility with decreased hip strength on the L. Patient is morbidly obese and would benefit from weight loss prior to surgical procedure. Patient would benefit from skilled physical therapy services in order to: improve L hip ROM and strengthen the LE bilaterally to improve surgical outcomes and ease ADL's     Problems:    [x] ? Pain: 3-9/10 L hip pain   [x] ? ROM: dec hip ROM bilaterally L>R   [x] ? Strength: dec L hip strength   [x] ? Function: Pain with sleeping and sit to stand transitions        Goals  MET NOT MET ON-  GOING  Details   Date Addressed:        STG: To be met in 6 treatments           1. ? Pain: Decrease L hip pain levels to <5/10 with ADL/s with modifications as needed  []  []  []      2. ? ROM: Increase L hip flexibility to at least 10 degrees hip extension and 40 degrees IR/ER to reduce difficulty with ADLs []  []  []      3. ? Strength: Increase MMT to 5/5 throughout to ease functional limitations and improve surgical outcomes  []  []  []     4. Independent with Home Exercise Programs []  []  []     5.  Demonstrate knowledge of fall risk prevention  []  []  []      []  [] []     Date Addressed:        LTG: To be met in 12 treatments       1. Improve score on assessment tool Lower Extremity Functional Scale (LEFS) from 70% impairment to less than 50% impairment with home modifications as needed  []  []  []     2. Reduce pain levels to 4/10 or less with ADLs []  []  []     3. Patient to verbalize understanding regarding post-operative plans and possible rehab following surgery per the surgeon's orders  []  []  []                             Patient goals: \"get ready for surgery\"    Rehab Potential:  [x] Good  [] Fair  [] Poor   Suggested Professional Referral:  [x] No  [] Yes:  Barriers to Goal Achievement:  [x] No  [] Yes:  Domestic Concerns:  [x] No  [] Yes:     Pt. Education:  [x] Plans/Goals, Risks/Benefits discussed  [x] Home exercise program    Method of Education: [x] Verbal  [x] Demo  [x] Written  Discussed reasoning behind pre-hab and the impairments the patient can improve upon before surgery. Access Code: [email or web address]  URL: [employer]/  Date: 07/21/2021  Prepared by: Mala Cerna    Exercises  Supine Lower Trunk Rotation - 2 x daily - 7 x weekly - 3 sets - 10 reps - 5 hold  Sidelying Hip Abduction - 2 x daily - 7 x weekly - 3 sets - 10 reps  Supine Hip Internal and External Rotation - 2 x daily - 7 x weekly - 3 sets - 10 reps  Modified Kole Stretch - 2 x daily - 7 x weekly - 3 sets - 60 hold  Standing Hip Flexor Stretch - 2 x daily - 7 x weekly - 3 sets - 10 reps - 30 hold    Patient Education  Hip Osteoarthritis    Comprehension of Education:  [x] Verbalizes understanding. [x] Demonstrates understanding. [x] Needs Review. [] Demonstrates/verbalizes understanding of HEP/Ed previously given.     Treatment Plan:  [x] Therapeutic Exercise   18313  [] Iontophoresis: 4 mg/mL Dexamethasone Sodium Phosphate  mAmin  57676   [x] Therapeutic Activity  06657 [x] Vasopneumatic cold with compression  61208    [x] Gait Training   58118 [] Ultrasound   [id number]   [x] Neuromuscular Re-education  [id number] [] Electrical Stimulation Unattended  97140   [x] Manual Therapy  56434 [] Electrical Stimulation Attended  [id number]   [x] Instruction in HEP  [] Lumbar/Cervical Traction  [id number]   [] Aquatic Therapy   [id number] [] Cold/hotpack    [] Massage   04425      [] Dry Needling, 1 or 2 muscles  88881   [] Biofeedback, first 15 minutes   16591  [] Biofeedback, additional 15 minutes   22613 [] Dry Needling, 3 or more muscles  49741     []  Medication allergies reviewed for use of    Dexamethasone Sodium Phosphate 4mg/ml     with iontophoresis treatments. Pt is not allergic.     Frequency:  1-2 x/week for 12 visits    Todays Treatment:  Precautions: Obesity, no hip precautions   Exercises:  Exercise    L hip OA  Reps/ Time Weight/ Level Comments         NuStep             Slantboard calf stretch  Next                  Supine      LTR  x10     Hip IR/ER  x10  Knee ext, opp knee bent    Hip flexor stretch  1'  Increased L hip pain    Hamstring stretch  Next            Sidelying       Hip abd  x5      Clamshells             Standing       Hip flexor stretch    Demonstration provided as alternative to supine stretch            Other:    Specific Instructions for next treatment: fall prevention handout, focus on standing exercises, assess SLS balance, hip mobility       Evaluation Complexity:  History (Personal factors, comorbidities) [] 0 [x] 1-2 [] 3+   Exam (limitations, restrictions) [] 1-2 [x] 3 [] 4+   Clinical presentation (progression) [x] Stable [] Evolving  [] Unstable   Decision Making [x] Low [] Moderate [] High    [x] Low Complexity [] Moderate Complexity [] High Complexity       Treatment Charges: Mins Units   [x] Evaluation       [x]  Low       []  Moderate       []  High 20 1   []  Modalities     [x]  Ther Exercise 10 1   []  Manual Therapy     []  Ther Activities     []  Aquatics     []  Vasocompression     []  Other       TOTAL TREATMENT TIME: 30 min     Time

## 2021-07-21 NOTE — CARE COORDINATION
[] [facility] &  Therapy  [address]  [phone]  F: [phone] [x] [address]  [facility]   [address]  P: [phone]  F: [phone] [] [address] &  Therapy  [address]  P: [phone]  F: [phone] [] [address]  [facility]   [address]   [place]: [phone]  F: [phone]     THERAPY RESPONSIBILITY OF CARE TRANSFER FORM       PATIENT NAME: Simone Slade  MRN: [id number]   : 1960      TRANSFERRING FACILITY:    [] [facility]   [] [facility]   [x]  [facility]   [] [facility]   [] [facility]   [] [facility]   [] [facility]  [] Alex Mooney   [] Other:       ACCEPTING FACILITY   [] [facility]   [] [facility]   [x]  [facility]   [] [facility]   [] [facility]   [] [facility]   [] [facility]  [] [facility]   [] Other:          REASON FOR TRANSFER: Transferring to a permanent therapist at the facility       TRANSFER OF CARE:    I am transferring the care of the above patient to: Adri Coyle, MILAGRO Mack, PT  2021      ACCEPTANCE OF CARE:     I am accepting the care of the above patient.  Adri Coyle, PT

## 2021-07-28 ENCOUNTER — HOSPITAL ENCOUNTER (OUTPATIENT)
Dept: PHYSICAL THERAPY | Facility: CLINIC | Age: 61
Setting detail: THERAPIES SERIES
Discharge: HOME | End: 2021-07-28
Payer: COMMERCIAL

## 2021-07-28 PROCEDURE — 97110 THERAPEUTIC EXERCISES: CPT

## 2021-07-28 NOTE — FLOWSHEET NOTE
[] [facility]) - [facility] &  Therapy  [address]  [phone]  F: [phone] [x] [address]  [facility]   [address]  P: [phone]  F: [phone] [] [address] &  Therapy  [address]  P: [phone]  F: [phone] [] [address]  P: [phone]  F: [phone] [] [address]  [facility]   [address]   [place]: [phone]  F: [phone]      Physical Therapy Daily Treatment Note    Date:  2021  Patient Name:  Lex Connolly    :  1960  MRN: [id number]  Patient: Lex Connolly                   : 1960                      MRN: [id number]  Physician: Dr. Bacilio King, Jose Ervin, CNP     Insurance: [employer] [place] (unlimited visits, medical review after 25 vs)   Medical Diagnosis: L hip OA                        Rehab Codes: M25.552, R29.3, R26.2, M25.55, M25.65, R26.0  Onset date: 2021                   Next 's appt.: 21  Visit# / total visits:  ; Progress note for Medicare patient due at visit 6     Cancels/No Shows: 0/0    Subjective:    Pain:  [x] Yes  [] No Location: L hip  Pain Rating: (0-10 scale) 5/10  Pain altered Tx:  [] No  [] Yes  Action:  Comments: Pt arrives to therapy 15mins late due to accidentally going to another doctor's office. Pt states he has been taking pain meds for arthritis that seem to be helping to reduce pain. Pt also states he has been getting leg cramps almost every morning.      Objective:  Modalities:   Precautions: Obesity, no hip precautions   Exercises:   Exercise     L hip OA  Reps/ Time Weight/ Level Comments             NuStep  5mins                 Slantboard calf stretch  3x30\"                 Supine         LTR  x10       SLR x10  Some pain noted   Hip IR/ER x10   Knee ext, opp knee bent    Hip flexor stretch  1'   Increased L hip pain    Hamstring stretch  3x30\"   L             Sidelying          Hip abd  10x       Clamshells  10x                 Standing          marches 15x     Hamstring curls 15x     2 way hip 15x  Abd, ext   Hip flexor stretch   3x30\" ea   Demonstration provided as alternative to supine stretch        Other:      Treatment Charges: Mins Units   []  Modalities     [x]  Ther Exercise 35 2   []  Manual Therapy     []  Ther Activities     []  Aquatics     []  Vasocompression     []  Other     Total Treatment time 35 2       Assessment: [x] Progressing toward goals. Initiated session on Nustep to warm up followed by standing stretches and exercises. Added standing marches and 2 way hip to promote weight bearing through LE to progress standing tolerance and LE strength. Continued with exercise log with overall good tolerance, needing VC's for proper exercise technique. Pt reported some back spasms with LTR and some pain with SLR that relieved with rest. Pt reports feeling \"pretty good\" at end of session with a minor decrease in pain. [] No change. [] Other:  [x] Patient would continue to benefit from skilled physical therapy services in order to: improve L hip ROM and strengthen the LE bilaterally to improve surgical outcomes and ease ADL's     Goals  MET NOT MET ON-  GOING  Details   Date Addressed:            STG: To be met in 6 treatments  ?          1. ? Pain: Decrease L hip pain levels to <5/10 with ADL/s with modifications as needed  []? ?  []??  []??      2. ? ROM: Increase L hip flexibility to at least 10 degrees hip extension and 40 degrees IR/ER to reduce difficulty with ADLs []? ?  []??  []??      3. ? Strength: Increase MMT to 5/5 throughout to ease functional limitations and improve surgical outcomes  []? ?  []??  []??      4. Independent with Home Exercise Programs []? ?  []??  []??      5.  Demonstrate knowledge of fall risk prevention []??  []??  []??        []? ?  []??  []??      Date Addressed:            LTG: To be met in 12 treatments           1. Improve score on assessment tool Lower Extremity Functional Scale (LEFS) from 70% impairment to less than 50% impairment with home modifications as needed  []? ?  []??  []??      2. Reduce pain levels to 4/10 or less with ADLs []? ?  []??  []??      3. Patient to verbalize understanding regarding post-operative plans and possible rehab following surgery per the surgeon's orders  []? ?  []??  []??                         Pt. Education:  [x] Yes  [] No  [x] Reviewed Prior HEP/Ed  Method of Education: [x] Verbal  [x] Demo  [] Written  Comprehension of Education:  [x] Verbalizes understanding. [] Demonstrates understanding. [x] Needs review. [] Demonstrates/verbalizes HEP/Ed previously given. Plan: [x] Continue current frequency toward long and short term goals. [x] Specific Instructions for subsequent treatments: Continue with tx per POC.       Time In: 9:15am            Time Out: 9:55 am    Electronically signed by:  Anand Oropeza PTA

## 2021-07-30 ENCOUNTER — HOSPITAL ENCOUNTER (OUTPATIENT)
Dept: PHYSICAL THERAPY | Facility: CLINIC | Age: 61
Setting detail: THERAPIES SERIES
Discharge: HOME | End: 2021-07-30
Payer: COMMERCIAL

## 2021-07-30 PROCEDURE — 97110 THERAPEUTIC EXERCISES: CPT

## 2021-07-30 NOTE — FLOWSHEET NOTE
[] [facility]) - [facility] &  Therapy  [address]  [phone]  F: [phone] [x] [address]  [facility]   [address]  P: [phone]  F: [phone] [] [address] &  Therapy  [address]  P: [phone]  F: [phone] [] [address]  P: [phone]  F: [phone] [] [address]  [facility]   [address]   [place]: [phone]  F: [phone]      Physical Therapy Daily Treatment Note    Date:  2021  Patient Name:  Pham Trent    :  1960  MRN: [id number]  Patient: Pham Trent                   : 1960                      MRN: [id number]  Physician: Dr. Tay Combs, Ariel Holstein, CNP     Insurance: [employer] [place] (unlimited visits, medical review after 25 vs)   Medical Diagnosis: L hip OA                        Rehab Codes: M25.552, R29.3, R26.2, M25.55, M25.65, R26.0  Onset date: 2021                   Next 's appt.: 21  Visit# / total visits: 3/12 ; Progress note for Medicare patient due at visit 6     Cancels/No Shows: 0/0    Subjective:    Pain:  [x] Yes  [] No Location: L hip  Pain Rating: (0-10 scale) 7/10  Pain altered Tx:  [x] No  [] Yes  Action:    Comments: Pt worked until about 3:30am. Reports higher pain levels associated with his work last night.      Objective:  Modalities:   Precautions: Obesity, no hip precautions   Exercises:   Exercise     L hip OA  Reps/ Time Weight/ Level Comments             NuStep  5'  Lv 2  warm up              Slantboard calf stretch  3x30\"                 Supine         LTR  x10       Heel slides 10x2 A With slide board- added 21   Quad sets 10x 5\" Added 21   HS sets 10x 5\" Added 21   SLR 10x  8x A Some pain noted   SAQ 2x10 2# Added 21 Hip add sets 10x 5\" Added 7/30/21   Hip abd 2x10 Blue  Added 7/30/21   Bridges    Attempted 7/30, but unable to complete d/t LBP   Hip IR/ER     Knee ext, opp knee bent    Hip flexor stretch     Increased L hip pain    Hamstring stretch  3x30\"   L with strap              Sidelying          Hip abd  10x2 A     Clamshells  10x2 A               Standing          Heel raises 15x  Added 7/30/21   marches 15x     Hamstring curls (L) 15x     2 way hip (B) 15x ea  Abd, ext   Hip flexor stretch   3x30\" ea   L knee on stool    Side step up (L) 15x 4\" Added 7/30/21       Other:      Treatment Charges: Mins Units   []  Modalities     [x]  Ther Exercise 41 3   []  Manual Therapy     []  Ther Activities     []  Aquatics     []  Vasocompression     []  Other     Total Treatment time 41 3       Assessment: [x] Progressing toward goals. Progressed exercises with fair tolerance. Pt reports c/o increased muscle soreness following session,but not increase to hip pain. Intermittently throughout session pt c/o muscle cramping in his L HS, which alleviates with a rest break. Attempted bridges, but pt could not tolerate secondary to h/o LBP. Pt declined need for ice to end session. Encouraged pt to ice at home as needed. [] No change. [] Other:  [x] Patient would continue to benefit from skilled physical therapy services in order to: improve L hip ROM and strengthen the LE bilaterally to improve surgical outcomes and ease ADL's     Goals  MET NOT MET ON-  GOING  Details   Date Addressed:            STG: To be met in 6 treatments  ?          1. ? Pain: Decrease L hip pain levels to <5/10 with ADL/s with modifications as needed  []? ?  []??  []??      2. ? ROM: Increase L hip flexibility to at least 10 degrees hip extension and 40 degrees IR/ER to reduce difficulty with ADLs []? ?  []??  []??      3. ? Strength: Increase MMT to 5/5 throughout to ease functional limitations and improve surgical outcomes  []? ?  []??  []??      4. Independent with Home Exercise Programs []? ?  []??  []??      5. Demonstrate knowledge of fall risk prevention  []? ?  []??  []??        []? ?  []??  []??      Date Addressed:            LTG: To be met in 12 treatments           1. Improve score on assessment tool Lower Extremity Functional Scale (LEFS) from 70% impairment to less than 50% impairment with home modifications as needed  []? ?  []??  []??      2. Reduce pain levels to 4/10 or less with ADLs []? ?  []??  []??      3. Patient to verbalize understanding regarding post-operative plans and possible rehab following surgery per the surgeon's orders  []? ?  []??  []??                         Pt. Education:  [] Yes  [x] No  [] Reviewed Prior HEP/Ed  Method of Education: [] Verbal  [] Demo  [] Written  Comprehension of Education:  [] Verbalizes understanding. [] Demonstrates understanding. [x] Needs review. [] Demonstrates/verbalizes HEP/Ed previously given. Plan: [x] Continue current frequency toward long and short term goals. [x] Specific Instructions for subsequent treatments: update HEP.       Time In: 9:00am            Time Out: 9:46am    Electronically signed by:  Swetha Field PTA

## 2021-08-03 ENCOUNTER — HOSPITAL ENCOUNTER (OUTPATIENT)
Dept: PHYSICAL THERAPY | Facility: CLINIC | Age: 61
Setting detail: THERAPIES SERIES
Discharge: HOME | End: 2021-08-03
Payer: COMMERCIAL

## 2021-08-03 NOTE — FLOWSHEET NOTE
[] [facility]) [facility]  Therapy  [address]    [phone]  F: [phone]   [] [address]  [facility] [address]   [address]  P: [phone]  F: [phone]  [] [address]  [address]  P: [phone]  F: [phone] [] [address]  P: [phone]  F: [phone]  [] [address]  [address] [address] [facility] [address]   [address]   [place]: [phone]  F: [phone]   [] [facility]  [address]  [place]: [phone]   F: [phone]     Physical Therapy Cancel/No Show note    Date: 8/3/2021  Patient: Pham Trent  : 1960  MRN: [id number]    Cancels/No Shows to date:     For today's appointment patient:    []  Cancelled    [] Rescheduled appointment    [x] No-show     Reason given by patient:    []  Patient ill    []  Conflicting appointment    [] No transportation      [] Conflict with work    [] No reason given    [] Weather related    [] COVID-19    [x] Other:      Comments: pts wife called 5min after appt time, states Dania Griffin worked late and is still sleeping.        [x] Next appointment was confirmed    Electronically signed by: Starla Chavez PTA

## 2021-08-05 ENCOUNTER — HOSPITAL ENCOUNTER (OUTPATIENT)
Dept: PHYSICAL THERAPY | Facility: CLINIC | Age: 61
Setting detail: THERAPIES SERIES
Discharge: HOME | End: 2021-08-05
Payer: COMMERCIAL

## 2021-08-05 PROCEDURE — 97110 THERAPEUTIC EXERCISES: CPT

## 2021-08-05 NOTE — FLOWSHEET NOTE
[] [facility]) - [facility]  Therapy  [address]  [phone]  F: [phone] [x] [address]  [address]   [address]  P: [phone]  F: [phone] [] [address] &  Therapy  [address]  P: [phone]  F: [phone] [] [address]  P: [phone]  F: [phone] [] [address]  [facility]   [address]   [place]: [phone]  F: [phone]      Physical Therapy Daily Treatment Note    Date:  2021  Patient Name:  Blairro Medico    :  1960  MRN: [id number]  Patient: Spero Medico                   : 1960                      MRN: [id number]  Physician: Dr. Kiki Morillo, Harjit England, CNP     Insurance: [employer] [place] (unlimited visits, medical review after 25 vs)   Medical Diagnosis: L hip OA                        Rehab Codes: M25.552, R29.3, R26.2, M25.55, M25.65, R26.0  Onset date: 2021                   Next 's appt.: 21  Visit# / total visits:  ; Progress note for Medicare patient due at visit 6     Cancels/No Shows: 0/1    Subjective:    Pain:  [x] Yes  [] No Location: L hip  Pain Rating: (0-10 scale) 5/10  Pain altered Tx:  [x] No  [] Yes  Action:    Comments: Pt reports feeling much improved since starting PT. Is concerned about his upcoming ANGELA as his surgeon is no longer working for [facility] and he has not heard anything from the office.     Objective:  Modalities:   Precautions: Obesity, no hip precautions   Exercises:   Exercise     L hip OA  Reps/ Time Weight/ Level Comments             NuStep  5'  Lv 2  warm up              Slantboard calf stretch  3x30\"                 Supine         LTR  x10       Heel slides 10x2 A With slide board- added 21   Quad sets 10x 5\" Added 21   HS sets 10x 5\" Added 7/30/21   SLR 10x  7x A Some pain noted   SAQ 2x10 2# Added 7/30/21   Hip add sets 10x 5\" Added 7/30/21   Hip abd 2x10 Blue  Added 7/30/21   Bridges    Attempted 7/30, but unable to complete d/t LBP   Hip IR/ER     Knee ext, opp knee bent    Hip flexor stretch     Increased L hip pain    Hamstring stretch  3x30\"   L with strap              Sidelying          Hip abd  10x2 A     Clamshells  10x2 A               Standing          Heel raises 20x  Added 7/30/21   Mini squat 10x  Added 8/5/21   marches 15x  No UE support   Hamstring curls (L) 15x     2 way hip (B) 15x ea Lime  Abd, ext- added resistance 8/5/21   Hip flexor stretch   3x30\" ea   L knee on stool    Forward step up (L) 15x 6\" Added 8/5/21   Side step up (L) 15x 6\" Progressed  8/5/21       Other:      Treatment Charges: Mins Units   []  Modalities     [x]  Ther Exercise 46 3   []  Manual Therapy     []  Ther Activities     []  Aquatics     []  Vasocompression     []  Other     Total Treatment time 46 3       Assessment: [x] Progressing toward goals. Progressed exercises with good tolerance. Pt continues to c/o pain in L groin during SLR, which limits the number of reps he can tolerate. No other limitations to exercise this date. Cued pt in sidelying to maintain proper alignment of his hips and to keep the correct plane of motion during each side lying exercise. Issued pt thorough updated HEP with lime and blue tband. [] No change. [] Other:  [x] Patient would continue to benefit from skilled physical therapy services in order to: improve L hip ROM and strengthen the LE bilaterally to improve surgical outcomes and ease ADL's     Goals  MET NOT MET ON-  GOING  Details   Date Addressed:            STG: To be met in 6 treatments  ?          1. ? Pain: Decrease L hip pain levels to <5/10 with ADL/s with modifications as needed  []? ?  []??  []??      2. ? ROM: Increase L hip flexibility to at least 10 degrees hip extension and 40 degrees IR/ER to reduce difficulty with ADLs []? ?  []??  []??      3. ? Strength: Increase MMT to 5/5 throughout to ease functional limitations and improve surgical outcomes  []? ?  []??  []??      4. Independent with Home Exercise Programs []? ?  []??  []??      5. Demonstrate knowledge of fall risk prevention  []? ?  []??  []??        []? ?  []??  []??      Date Addressed:            LTG: To be met in 12 treatments           1. Improve score on assessment tool Lower Extremity Functional Scale (LEFS) from 70% impairment to less than 50% impairment with home modifications as needed  []? ?  []??  []??      2. Reduce pain levels to 4/10 or less with ADLs []? ?  []??  []??      3. Patient to verbalize understanding regarding post-operative plans and possible rehab following surgery per the surgeon's orders  []? ?  []??  []??                         Pt. Education:  [x] Yes  [] No  [x] Reviewed Prior HEP/Ed  Method of Education: [x] Verbal  [x] Demo  [x] Written- used updated HEP under [employer] access code [email or web address]  Comprehension of Education:  [x] Verbalizes understanding. [x] Demonstrates understanding. [x] Needs review. [] Demonstrates/verbalizes HEP/Ed previously given. Plan: [x] Continue current frequency toward long and short term goals. [x] Specific Instructions for subsequent treatments: pt would like to hold PT at this time until he figures out what the plan is moving forward with his ANGELA.  Surgery was initially scheduled for 9/22/21, but pt's surgeon is no longer working, and pt has not heard any plans moving forward at this time       Time In: 9:00am            Time Out: 9:51am    Electronically signed by:  Ethan Gutierres PTA

## 2021-08-13 ENCOUNTER — HOSPITAL ENCOUNTER (OUTPATIENT)
Dept: ULTRASOUND IMAGING | Age: 61
Discharge: HOME | End: 2021-08-15
Payer: COMMERCIAL

## 2021-08-13 DIAGNOSIS — K76.0 FATTY LIVER: ICD-10-CM

## 2021-08-13 PROCEDURE — 76705 ECHO EXAM OF ABDOMEN: CPT

## 2021-08-13 PROCEDURE — 76981 USE PARENCHYMA: CPT

## 2021-08-19 ENCOUNTER — TELEPHONE (OUTPATIENT)
Dept: GASTROENTEROLOGY | Age: 61
End: 2021-08-19

## 2021-08-19 ENCOUNTER — OFFICE VISIT (OUTPATIENT)
Dept: GASTROENTEROLOGY | Age: 61
End: 2021-08-19
Payer: COMMERCIAL

## 2021-08-19 VITALS
HEART RATE: 78 BPM | HEIGHT: 69 IN | BODY MASS INDEX: 44.43 KG/M2 | SYSTOLIC BLOOD PRESSURE: 107 MMHG | DIASTOLIC BLOOD PRESSURE: 68 MMHG | WEIGHT: 300 LBS

## 2021-08-19 DIAGNOSIS — K76.0 FATTY LIVER: Primary | ICD-10-CM

## 2021-08-19 DIAGNOSIS — Z86.010 HX OF COLONIC POLYPS: ICD-10-CM

## 2021-08-19 PROCEDURE — 99204 OFFICE O/P NEW MOD 45 MIN: CPT | Performed by: INTERNAL MEDICINE

## 2021-08-19 RX ORDER — DICLOFENAC SODIUM 75 MG/1
TABLET, DELAYED RELEASE ORAL
COMMUNITY
End: 2022-10-26

## 2021-08-19 ASSESSMENT — ENCOUNTER SYMPTOMS
BLOOD IN STOOL: 1
SORE THROAT: 0
VOICE CHANGE: 0
BACK PAIN: 1
VOMITING: 0
SHORTNESS OF BREATH: 1
CONSTIPATION: 0
WHEEZING: 0
RECTAL PAIN: 0
TROUBLE SWALLOWING: 0
ABDOMINAL DISTENTION: 0
DIARRHEA: 0
COUGH: 0

## 2021-08-19 NOTE — PROGRESS NOTES
([facility] [address]     Hyperlipidemia     Hypertension     Morbid obesity ([facility] [address]     Sleep apnea     HAD SURGERY       Past Surgical History:   Procedure Laterality Date    CARDIAC SURGERY      CARDIAC CATH  GREATER THAN 5 YRS AGO    CARPAL TUNNEL RELEASE Right     COLONOSCOPY      ELBOW SURGERY Left 4/25/16    lt lateral epicondylectomy    JOINT REPLACEMENT Bilateral     KNEES    KNEE ARTHROSCOPY Bilateral     2-3x each    UPPP UVULOPALATOPHARYGOPLASTY      WISDOM TOOTH EXTRACTION         CURRENT MEDICATIONS:    Current Outpatient Medications:     metFORMIN (GLUCOPHAGE) 500 MG tablet, Take 500 mg by mouth 2 times daily (with meals), Disp: , Rfl:     allopurinol (ZYLOPRIM) 100 MG tablet, Take 1 tablet by mouth daily, Disp: 30 tablet, Rfl: 1    colchicine (MITIGARE) 0.6 MG capsule, Take 1 capsule by mouth daily, Disp: 30 capsule, Rfl: 0    colchicine (COLCRYS) 0.6 MG tablet, Take 1 tablet by mouth daily, Disp: 30 tablet, Rfl: 3    predniSONE (DELTASONE) 10 MG tablet, One tab po TID x 3 days, one tab po BID x 3 days, one tab po qd x 3 days, 1/2 tab po qd x 4 days, Disp: 20 tablet, Rfl: 0    lisinopril (PRINIVIL;ZESTRIL) 40 MG tablet, Take 40 mg by mouth daily, Disp: , Rfl:     meloxicam (MOBIC) 15 MG tablet, Take 1 tablet by mouth daily (Patient not taking: Reported on 7/15/2021), Disp: 30 tablet, Rfl: 1    metoprolol (TOPROL-XL) 50 MG XL tablet, Take 50 mg by mouth daily, Disp: , Rfl:     hydrochlorothiazide (HYDRODIURIL) 25 MG tablet, Take 25 mg by mouth daily, Disp: , Rfl:     aspirin 325 MG tablet, Take 325 mg by mouth daily, Disp: , Rfl:     ALLERGIES:   No Known Allergies    FAMILY HISTORY:       Problem Relation Age of Onset    Heart Disease Mother     Diabetes Mother     Hypertension Mother     Hypertension Father          SOCIAL HISTORY:   Social History     Socioeconomic History    Marital status:      Spouse name: Not on file    Number of children: Not on file    Years of education: Not on file    Highest education level: Not on file   Occupational History    Not on file   Tobacco Use    Smoking status: Never Smoker    Smokeless tobacco: Never Used   Vaping Use    Vaping Use: Never used   Substance and Sexual Activity    Alcohol use: Yes     Comment: occasionally    Drug use: No    Sexual activity: Not on file   Other Topics Concern    Not on file   Social History Narrative    Not on file     Social Determinants of Health     Financial Resource Strain:     Difficulty of Paying Living Expenses:    Food Insecurity:     Worried About Running Out of Food in the Last Year:     Ran Out of Food in the Last Year:    Transportation Needs:     Lack of Transportation (Medical):  Lack of Transportation (Non-Medical):    Physical Activity:     Days of Exercise per Week:     Minutes of Exercise per Session:    Stress:     Feeling of Stress :    Social Connections:     Frequency of Communication with Friends and Family:     Frequency of Social Gatherings with Friends and Family:     Attends Christianity Services:     Active Member of Clubs or Organizations:     Attends Club or Organization Meetings:     Marital Status:    Intimate Partner Violence:     Fear of Current or Ex-Partner:     Emotionally Abused:     Physically Abused:     Sexually Abused:        REVIEW OF SYSTEMS: A 12-point review of systemswas obtained and pertinent positives and negatives were enumerated above in the history of present illness. All other reviewed systems / symptoms were negative. Review of Systems   Constitutional: Negative for appetite change and unexpected weight change. HENT: Negative for sore throat, trouble swallowing and voice change. Respiratory: Positive for shortness of breath. Negative for cough and wheezing. Cardiovascular: Negative for chest pain. Gastrointestinal: Positive for blood in stool. Negative for abdominal distention, constipation, diarrhea, rectal pain and vomiting. Genitourinary: Positive for difficulty urinating. Musculoskeletal: Positive for back pain and joint swelling. Neurological: Negative for dizziness, tremors, weakness, light-headedness and headaches. Hematological: Does not bruise/bleed easily. Psychiatric/Behavioral: Negative for sleep disturbance. LABORATORY DATA: Reviewed  Lab Results   Component Value Date    WBC 5.6 04/26/2021    HGB 15.0 04/26/2021    HCT 43.4 04/26/2021    MCV 98.4 04/26/2021     04/26/2021     04/26/2021    K 3.8 04/26/2021     04/26/2021    CO2 27 04/26/2021    BUN 13 04/26/2021    CREATININE 0.83 04/26/2021    LABALBU 4.6 04/26/2021    BILITOT 0.41 04/26/2021    ALKPHOS 58 04/26/2021    AST 30 04/26/2021    ALT 28 04/26/2021    INR 1.0 03/10/2019         Lab Results   Component Value Date    RBC 4.41 04/26/2021    HGB 15.0 04/26/2021    MCV 98.4 04/26/2021    MCH 34.0 (H) 04/26/2021    MCHC 34.6 04/26/2021    RDW 13.4 04/26/2021    MPV 9.9 04/26/2021    BASOPCT 0 04/26/2021    LYMPHSABS 1.24 04/26/2021    MONOSABS 0.57 04/26/2021    NEUTROABS 3.63 04/26/2021    EOSABS 0.13 04/26/2021    BASOSABS <0.03 04/26/2021         DIAGNOSTIC TESTING:     US LIVER    Result Date: 8/13/2021  EXAMINATION: RIGHT UPPER QUADRANT ULTRASOUND <Exam Title>  8/13/2021 9:44 am TECHNIQUE: Multiple real time sonographic images of the abdominal right upper quadrant were obtained assessing gray-scale appearance and color Doppler. Elastography of the liver was obtained using [employer]. COMPARISON: CT abdomen and pelvis April 26, 2021. HISTORY: ORDERING SYSTEM PROVIDED HISTORY: Fatty liver FINDINGS: LIVER: Fatty infiltration of the liver. No focal mass or intrahepatic bile duct dilatation. Hepatopetal flow seen within the portal vein. Median Shear Wave Elastography measures 9.49 kPa.  IQR/Median Value (results are valid if <0.30): 0.22 BILIARY SYSTEM: Gallbladder is unremarkable without evidence of pericholecystic fluid, wall thickening or stones. Negative sonographic English's sign. Common bile duct is within normal limits measuring 4.7 mm. RIGHT KIDNEY: The right kidney is grossly unremarkable without evidence of hydronephrosis. PANCREAS: Visualized portions of the pancreas are unremarkable. OTHER: No evidence of right upper quadrant ascites. Fatty infiltration of the liver. Metavir Score: F3. RECOMMENDATIONS: Liver Fibrosis Staging - Normal to Mild - Metavir Score F1 (5.48- 8.29 kPa) - Mild to Moderate - Metavir Score F2 (8.29 - 9.40 kPa) - Moderate to Severe - Metavir Score F3 (9.40 - 11.9 kPa) - Cirrhosis - Metavir Score F4 (>11.9 kPa)     US ORGAN ELASTOGRAPHY    Result Date: 8/13/2021  EXAMINATION: RIGHT UPPER QUADRANT ULTRASOUND <Exam Title>  8/13/2021 9:44 am TECHNIQUE: Multiple real time sonographic images of the abdominal right upper quadrant were obtained assessing gray-scale appearance and color Doppler. Elastography of the liver was obtained using [employer]. COMPARISON: CT abdomen and pelvis April 26, 2021. HISTORY: ORDERING SYSTEM PROVIDED HISTORY: Fatty liver FINDINGS: LIVER: Fatty infiltration of the liver. No focal mass or intrahepatic bile duct dilatation. Hepatopetal flow seen within the portal vein. Median Shear Wave Elastography measures 9.49 kPa. IQR/Median Value (results are valid if <0.30): 0.22 BILIARY SYSTEM: Gallbladder is unremarkable without evidence of pericholecystic fluid, wall thickening or stones. Negative sonographic English's sign. Common bile duct is within normal limits measuring 4.7 mm. RIGHT KIDNEY: The right kidney is grossly unremarkable without evidence of hydronephrosis. PANCREAS: Visualized portions of the pancreas are unremarkable. OTHER: No evidence of right upper quadrant ascites. Fatty infiltration of the liver.  Metavir Score: F3. RECOMMENDATIONS: Liver Fibrosis Staging - Normal to Mild - Metavir Score F1 (5.48- 8.29 kPa) - Mild to Moderate - Metavir Score F2 (8.29 - 9.40 kPa) - Moderate to Severe - Metavir Score F3 (9.40 - 11.9 kPa) - Cirrhosis - Metavir Score F4 (>11.9 kPa)        /68 (Site: Left Upper Arm, Position: Sitting, Cuff Size: Large Adult)   Pulse 78   Ht 5' 9\" (1.753 m)   Wt 300 lb (136.1 kg)   BMI 44.30 kg/m²     PHYSICAL EXAMINATION: Vital signs reviewed per the nursing documentation. Body mass index is 44.3 kg/m². Physical Exam  Vitals and nursing note reviewed. Constitutional:       General: He is not in acute distress. Appearance: He is well-developed. He is not diaphoretic. HENT:      Head: Normocephalic and atraumatic. Eyes:      General: No scleral icterus. Pupils: Pupils are equal, round, and reactive to light. Neck:      Thyroid: No thyromegaly. Vascular: No JVD. Trachea: No tracheal deviation. Cardiovascular:      Rate and Rhythm: Normal rate and regular rhythm. Heart sounds: Normal heart sounds. No murmur heard. Pulmonary:      Effort: Pulmonary effort is normal. No respiratory distress. Breath sounds: Normal breath sounds. No wheezing. Abdominal:      General: Bowel sounds are normal. There is no distension. Palpations: Abdomen is soft. There is no mass. Tenderness: There is no abdominal tenderness. There is no guarding or rebound. Musculoskeletal:         General: No tenderness. Normal range of motion. Cervical back: Normal range of motion and neck supple. Skin:     General: Skin is warm. Coloration: Skin is not pale. Findings: No erythema or rash. Comments: He is not diaphoretic   Neurological:      Mental Status: He is alert and oriented to person, place, and time. Deep Tendon Reflexes: Reflexes are normal and symmetric. Psychiatric:         Behavior: Behavior normal.         Thought Content:  Thought content normal.         Judgment: Judgment normal. IMPRESSION: Mr. Cathi Mcardle is a 64 y.o. male with          Diagnosis Orders   1. Fatty liver  Hepatitis A Antibody, Total    Hepatitis B Core Antibody, Total    Hepatitis B Surface Antibody    Hepatitis C Antibody    Hepatitis B Surface Antigen    CONTRERAS    Smooth Muscle Antibody Quant    Iron and TIBC    Angiotensin Converting Enzyme    Protime-INR    AFP Tumor Marker   2. Hx of colonic polyps  COLONOSCOPY W/ OR W/O BIOPSY     Long discussion with the patient was made about the metabolic syndrome  About the importance of losing weight and exercising  Told him to drink no alcohol are all although he is not a heavy drinker he drinks very infrequently\  Told him also to avoid NSAIDs  And will take it from there  We will proceed with a colonoscopy because of his history of polyps on a colonoscopy 8 years ago      Diet/life style/natural hx /complication of the dx were all explained in details   Past medical, past surgical, social history, psychiatric history, medications or allergies, all reviewed and  updated        Thank you for allowing me to participate in the care of Mr. Cathi Mcardle. For any further questions please do not hesitate to contact me. I have reviewed and agree with the MA/SWATI ROS. Note is dictated utilizing voice recognition software. Unfortunately this leads to occasional typographical errors. Please contact our office if you have any questions.     Silas Muñoz MD  [facility]  O: #[phone]

## 2021-08-19 NOTE — TELEPHONE ENCOUNTER
Pt wants to check w/ his wife before scheduling EGD/colon procs ordered at [address] on 8/19/21.   He will call back to schedule

## 2021-10-14 NOTE — TELEPHONE ENCOUNTER
Writer spoke with Mulugeta Cuello. She is advised of labs needing completed and why. She is also advised about colonoscopy. I see no EGD ordered. She is waiting for the office to call her to schedule procedure.

## 2021-10-15 RX ORDER — SODIUM, POTASSIUM,MAG SULFATES 17.5-3.13G
SOLUTION, RECONSTITUTED, ORAL ORAL
Qty: 1 EACH | Refills: 0 | Status: ON HOLD | OUTPATIENT
Start: 2021-10-15 | End: 2021-11-11 | Stop reason: ALTCHOICE

## 2021-10-15 NOTE — TELEPHONE ENCOUNTER
Writer returned phone call and spoke to pt's wife, Tejas Bess, in regards to scheduling colonoscopy proc. Writer informed Tejas Bess EGD was placed in msg by mistake that the dr ordered colonoscopy only. Per wife, pt is on aspirin and meloxicam & no other blood thinners. Per wife, pt is vaccinated for Covid-19 virus and has no kidney disease. Pt is sched w/ [facility] at [address] [place] [address] 11/11/21 @ 10:15am proc time, 8:15am arrival time. Suprep order will be sent to [facility] on [place], [place]. Bowel prep instructions given to wife over the phone and hard copy e-mailed to [email or web address] Tejasmartina Bess instructed pt will need a  and to bring proof of Covid-19 vaccinations on proc day. Writer will call pt back w/ PAT phone call date & time due to [facility] surgery did not answer at time of scheduling. Tejas Maria Alejandra voices her understanding.

## 2021-10-15 NOTE — TELEPHONE ENCOUNTER
Writer spoke to pt over the phone informing him he is sched for PAT phone call on 10/28/21 @ 10am.  Pt voices his understanding.

## 2021-10-28 ENCOUNTER — HOSPITAL ENCOUNTER (OUTPATIENT)
Dept: PREADMISSION TESTING | Age: 61
Discharge: HOME | End: 2021-11-01
Payer: COMMERCIAL

## 2021-10-28 VITALS — BODY MASS INDEX: 45.47 KG/M2 | WEIGHT: 300 LBS | HEIGHT: 68 IN

## 2021-10-28 NOTE — PROGRESS NOTES

## 2021-11-02 ENCOUNTER — HOSPITAL ENCOUNTER (OUTPATIENT)
Age: 61
Discharge: HOME | End: 2021-11-02
Payer: COMMERCIAL

## 2021-11-02 DIAGNOSIS — K76.0 FATTY LIVER: ICD-10-CM

## 2021-11-02 LAB
AFP: 4.2 UG/L
ANGIOTENSIN-CONVERTING ENZYME: 13 U/L (ref 8–52)
HAV AB SERPL IA-ACNC: NONREACTIVE
HBV SURFACE AB TITR SER: <3.5 MIU/ML
HEPATITIS B CORE TOTAL ANTIBODY: NONREACTIVE
HEPATITIS B SURFACE ANTIGEN: NONREACTIVE
HEPATITIS C ANTIBODY: NONREACTIVE
INR BLD: 1
IRON SATURATION: 41 % (ref 20–55)
IRON: 137 UG/DL (ref 59–158)
PROTHROMBIN TIME: 10.9 SEC (ref 9.1–12.3)
TOTAL IRON BINDING CAPACITY: 338 UG/DL (ref 250–450)
UNSATURATED IRON BINDING CAPACITY: 201 UG/DL (ref 112–347)

## 2021-11-02 PROCEDURE — 86225 DNA ANTIBODY NATIVE: CPT

## 2021-11-02 PROCEDURE — 83540 ASSAY OF IRON: CPT

## 2021-11-02 PROCEDURE — 87340 HEPATITIS B SURFACE AG IA: CPT

## 2021-11-02 PROCEDURE — 86038 ANTINUCLEAR ANTIBODIES: CPT

## 2021-11-02 PROCEDURE — 86317 IMMUNOASSAY INFECTIOUS AGENT: CPT

## 2021-11-02 PROCEDURE — 85610 PROTHROMBIN TIME: CPT

## 2021-11-02 PROCEDURE — 82105 ALPHA-FETOPROTEIN SERUM: CPT

## 2021-11-02 PROCEDURE — 86704 HEP B CORE ANTIBODY TOTAL: CPT

## 2021-11-02 PROCEDURE — 86708 HEPATITIS A ANTIBODY: CPT

## 2021-11-02 PROCEDURE — 82164 ANGIOTENSIN I ENZYME TEST: CPT

## 2021-11-02 PROCEDURE — 83516 IMMUNOASSAY NONANTIBODY: CPT

## 2021-11-02 PROCEDURE — 36415 COLL VENOUS BLD VENIPUNCTURE: CPT

## 2021-11-02 PROCEDURE — 86803 HEPATITIS C AB TEST: CPT

## 2021-11-02 PROCEDURE — 83550 IRON BINDING TEST: CPT

## 2021-11-03 LAB
ANTI DNA DOUBLE STRANDED: <0.5 IU/ML
ANTI-NUCLEAR ANTIBODY (ANA): NEGATIVE
ENA ANTIBODIES SCREEN: <0.1 U/ML

## 2021-11-04 LAB — SMOOTH MUSCLE ANTIBODY: 4 UNITS (ref 0–19)

## 2021-11-10 ENCOUNTER — ANESTHESIA EVENT (OUTPATIENT)
Dept: ENDOSCOPY | Age: 61
End: 2021-11-10
Payer: COMMERCIAL

## 2021-11-11 ENCOUNTER — HOSPITAL ENCOUNTER (OUTPATIENT)
Age: 61
Setting detail: OUTPATIENT SURGERY
Discharge: HOME | End: 2021-11-11
Attending: INTERNAL MEDICINE | Admitting: INTERNAL MEDICINE
Payer: COMMERCIAL

## 2021-11-11 ENCOUNTER — ANESTHESIA (OUTPATIENT)
Dept: ENDOSCOPY | Age: 61
End: 2021-11-11
Payer: COMMERCIAL

## 2021-11-11 VITALS
DIASTOLIC BLOOD PRESSURE: 82 MMHG | SYSTOLIC BLOOD PRESSURE: 107 MMHG | OXYGEN SATURATION: 98 % | RESPIRATION RATE: 20 BRPM

## 2021-11-11 VITALS
WEIGHT: 300 LBS | TEMPERATURE: 97.5 F | OXYGEN SATURATION: 95 % | SYSTOLIC BLOOD PRESSURE: 109 MMHG | HEART RATE: 69 BPM | RESPIRATION RATE: 16 BRPM | BODY MASS INDEX: 45.47 KG/M2 | HEIGHT: 68 IN | DIASTOLIC BLOOD PRESSURE: 73 MMHG

## 2021-11-11 LAB
GLUCOSE BLD-MCNC: 113 MG/DL (ref 75–110)
GLUCOSE BLD-MCNC: 98 MG/DL (ref 75–110)

## 2021-11-11 PROCEDURE — 2709999900 HC NON-CHARGEABLE SUPPLY: Performed by: INTERNAL MEDICINE

## 2021-11-11 PROCEDURE — 3609010300 HC COLONOSCOPY W/BIOPSY SINGLE/MULTIPLE: Performed by: INTERNAL MEDICINE

## 2021-11-11 PROCEDURE — 82947 ASSAY GLUCOSE BLOOD QUANT: CPT

## 2021-11-11 PROCEDURE — 7100000011 HC PHASE II RECOVERY - ADDTL 15 MIN: Performed by: INTERNAL MEDICINE

## 2021-11-11 PROCEDURE — 7100000000 HC PACU RECOVERY - FIRST 15 MIN: Performed by: INTERNAL MEDICINE

## 2021-11-11 PROCEDURE — 7100000030 HC ASPR PHASE II RECOVERY - FIRST 15 MIN: Performed by: INTERNAL MEDICINE

## 2021-11-11 PROCEDURE — 6360000002 HC RX W HCPCS: Performed by: NURSE ANESTHETIST, CERTIFIED REGISTERED

## 2021-11-11 PROCEDURE — 2500000003 HC RX 250 WO HCPCS: Performed by: NURSE ANESTHETIST, CERTIFIED REGISTERED

## 2021-11-11 PROCEDURE — 7100000001 HC PACU RECOVERY - ADDTL 15 MIN: Performed by: INTERNAL MEDICINE

## 2021-11-11 PROCEDURE — 3700000001 HC ADD 15 MINUTES (ANESTHESIA): Performed by: INTERNAL MEDICINE

## 2021-11-11 PROCEDURE — 88305 TISSUE EXAM BY PATHOLOGIST: CPT

## 2021-11-11 PROCEDURE — 3700000000 HC ANESTHESIA ATTENDED CARE: Performed by: INTERNAL MEDICINE

## 2021-11-11 PROCEDURE — 45380 COLONOSCOPY AND BIOPSY: CPT | Performed by: INTERNAL MEDICINE

## 2021-11-11 PROCEDURE — 2580000003 HC RX 258: Performed by: ANESTHESIOLOGY

## 2021-11-11 PROCEDURE — 7100000031 HC ASPR PHASE II RECOVERY - ADDTL 15 MIN: Performed by: INTERNAL MEDICINE

## 2021-11-11 PROCEDURE — 7100000010 HC PHASE II RECOVERY - FIRST 15 MIN: Performed by: INTERNAL MEDICINE

## 2021-11-11 RX ORDER — ONDANSETRON 2 MG/ML
4 INJECTION INTRAMUSCULAR; INTRAVENOUS
Status: DISCONTINUED | OUTPATIENT
Start: 2021-11-11 | End: 2021-11-11 | Stop reason: HOSPADM

## 2021-11-11 RX ORDER — SODIUM CHLORIDE 0.9 % (FLUSH) 0.9 %
10 SYRINGE (ML) INJECTION EVERY 12 HOURS SCHEDULED
Status: DISCONTINUED | OUTPATIENT
Start: 2021-11-11 | End: 2021-11-11 | Stop reason: HOSPADM

## 2021-11-11 RX ORDER — SODIUM CHLORIDE, SODIUM LACTATE, POTASSIUM CHLORIDE, CALCIUM CHLORIDE 600; 310; 30; 20 MG/100ML; MG/100ML; MG/100ML; MG/100ML
INJECTION, SOLUTION INTRAVENOUS CONTINUOUS
Status: DISCONTINUED | OUTPATIENT
Start: 2021-11-11 | End: 2021-11-11 | Stop reason: HOSPADM

## 2021-11-11 RX ORDER — SODIUM CHLORIDE 0.9 % (FLUSH) 0.9 %
10 SYRINGE (ML) INJECTION PRN
Status: DISCONTINUED | OUTPATIENT
Start: 2021-11-11 | End: 2021-11-11 | Stop reason: HOSPADM

## 2021-11-11 RX ORDER — PROPOFOL 10 MG/ML
INJECTION, EMULSION INTRAVENOUS PRN
Status: DISCONTINUED | OUTPATIENT
Start: 2021-11-11 | End: 2021-11-11 | Stop reason: SDUPTHER

## 2021-11-11 RX ORDER — LIDOCAINE HYDROCHLORIDE 10 MG/ML
1 INJECTION, SOLUTION EPIDURAL; INFILTRATION; INTRACAUDAL; PERINEURAL
Status: DISCONTINUED | OUTPATIENT
Start: 2021-11-11 | End: 2021-11-11 | Stop reason: HOSPADM

## 2021-11-11 RX ORDER — DIPHENHYDRAMINE HYDROCHLORIDE 50 MG/ML
12.5 INJECTION INTRAMUSCULAR; INTRAVENOUS
Status: DISCONTINUED | OUTPATIENT
Start: 2021-11-11 | End: 2021-11-11 | Stop reason: HOSPADM

## 2021-11-11 RX ORDER — MORPHINE SULFATE 2 MG/ML
2 INJECTION, SOLUTION INTRAMUSCULAR; INTRAVENOUS EVERY 5 MIN PRN
Status: DISCONTINUED | OUTPATIENT
Start: 2021-11-11 | End: 2021-11-11 | Stop reason: HOSPADM

## 2021-11-11 RX ORDER — SODIUM CHLORIDE 9 MG/ML
25 INJECTION, SOLUTION INTRAVENOUS PRN
Status: DISCONTINUED | OUTPATIENT
Start: 2021-11-11 | End: 2021-11-11 | Stop reason: HOSPADM

## 2021-11-11 RX ORDER — LABETALOL HYDROCHLORIDE 5 MG/ML
5 INJECTION, SOLUTION INTRAVENOUS EVERY 10 MIN PRN
Status: DISCONTINUED | OUTPATIENT
Start: 2021-11-11 | End: 2021-11-11 | Stop reason: HOSPADM

## 2021-11-11 RX ORDER — LIDOCAINE HYDROCHLORIDE 10 MG/ML
INJECTION, SOLUTION EPIDURAL; INFILTRATION; INTRACAUDAL; PERINEURAL PRN
Status: DISCONTINUED | OUTPATIENT
Start: 2021-11-11 | End: 2021-11-11 | Stop reason: SDUPTHER

## 2021-11-11 RX ORDER — MEPERIDINE HYDROCHLORIDE 25 MG/ML
12.5 INJECTION INTRAMUSCULAR; INTRAVENOUS; SUBCUTANEOUS EVERY 5 MIN PRN
Status: DISCONTINUED | OUTPATIENT
Start: 2021-11-11 | End: 2021-11-11 | Stop reason: HOSPADM

## 2021-11-11 RX ADMIN — LIDOCAINE HYDROCHLORIDE 50 MG: 10 INJECTION, SOLUTION EPIDURAL; INFILTRATION; INTRACAUDAL; PERINEURAL at 09:06

## 2021-11-11 RX ADMIN — PROPOFOL 30 MG: 10 INJECTION, EMULSION INTRAVENOUS at 09:14

## 2021-11-11 RX ADMIN — PROPOFOL 30 MG: 10 INJECTION, EMULSION INTRAVENOUS at 09:16

## 2021-11-11 RX ADMIN — PROPOFOL 30 MG: 10 INJECTION, EMULSION INTRAVENOUS at 09:19

## 2021-11-11 RX ADMIN — PROPOFOL 20 MG: 10 INJECTION, EMULSION INTRAVENOUS at 09:17

## 2021-11-11 RX ADMIN — PROPOFOL 20 MG: 10 INJECTION, EMULSION INTRAVENOUS at 09:13

## 2021-11-11 RX ADMIN — PROPOFOL 120 MG: 10 INJECTION, EMULSION INTRAVENOUS at 09:06

## 2021-11-11 RX ADMIN — PROPOFOL 40 MG: 10 INJECTION, EMULSION INTRAVENOUS at 09:09

## 2021-11-11 RX ADMIN — PROPOFOL 40 MG: 10 INJECTION, EMULSION INTRAVENOUS at 09:11

## 2021-11-11 RX ADMIN — SODIUM CHLORIDE, POTASSIUM CHLORIDE, SODIUM LACTATE AND CALCIUM CHLORIDE: 600; 310; 30; 20 INJECTION, SOLUTION INTRAVENOUS at 08:55

## 2021-11-11 ASSESSMENT — PULMONARY FUNCTION TESTS
PIF_VALUE: 1
PIF_VALUE: 2
PIF_VALUE: 1

## 2021-11-11 ASSESSMENT — ENCOUNTER SYMPTOMS
BACK PAIN: 1
SHORTNESS OF BREATH: 0
STRIDOR: 0
COUGH: 0
WHEEZING: 0
SORE THROAT: 0
RHINORRHEA: 0
ROS SKIN COMMENTS: SEE HPI
SHORTNESS OF BREATH: 0

## 2021-11-11 ASSESSMENT — LIFESTYLE VARIABLES: SMOKING_STATUS: 0

## 2021-11-11 ASSESSMENT — PAIN - FUNCTIONAL ASSESSMENT: PAIN_FUNCTIONAL_ASSESSMENT: 0-10

## 2021-11-11 ASSESSMENT — PAIN SCALES - GENERAL: PAINLEVEL_OUTOF10: 0

## 2021-11-11 NOTE — H&P
HISTORY and [address] [id number]       NAME:  Liz Ricardo  MRN: [id number]   YOB: 1960   Date: 11/11/2021   Age: 63 y.o. Gender: male       COMPLAINT AND PRESENT HISTORY:     Freddy Pak is 63 y.o., male, having a diagnostic colonoscopy. Pt has had previous colonoscopy last approximately 9 years ago. History of colon polyps. Pt has noticed blood in stool that occurs intermittently that occurs once every couple weeks that last for about 2-3 days at a time. Denies abdominal pain, heartburn, nausea, vomiting, diarrhea, constipation and melena. Denies decreased appetite and unintended weight loss. Denies Family Hx of colon cancer. Of note, he has a \"pimple-like\" lump at the base of scrotum that he initially noticed approximately 6 months ago that does intermittently have pus drainage. He describes this lump as irritating and tender at times. He reports the lump is currently tender. Pt encouraged to follow up with PCP regarding this lump. Completed and followed prescribed prep. History of DM. He does not check his BS routinely. NPO since before midnight. No medications taken this am. Stopped aspirin and diclofenac one week ago. Denies taking any other blood thinning medications. Denies chest pain/pressure, SOB, recent URI, fever or chills.      US Liver completed on 08/13/2021:    Impression   Fatty infiltration of the liver.       Metavir Score: F3.       RECOMMENDATIONS:   Liver Fibrosis Staging       - Normal to Mild - Metavir Score F1 (5.48- 8.29 kPa)       - Mild to Moderate - Metavir Score F2 (8.29 - 9.40 kPa)       - Moderate to Severe - Metavir Score F3 (9.40 - 11.9 kPa)       - Cirrhosis - Metavir Score F4 (>11.9 kPa)     CT abdomen pelvis completed on 04/26/2021:    Impression   No acute abnormality within the abdomen and pelvis.       Fatty liver.       Diverticulosis with no signs of diverticulitis.       Fat containing umbilical hernia.       Unchanged 7 mm and 5 mm right lower lobe pulmonary nodules since prior   examination of 2016, likely suggestive of benign etiology.          PAST MEDICAL HISTORY     Past Medical History:   Diagnosis Date    Abnormal EKG     BY HX    Chest pain     COVID-19 vaccine administered 1-,12-    Pfizer and has received Booster of Pfizer     Diabetes mellitus ([facility] [address]     History of cardiac cath 03/15/2019    History of stress test 02/22/2019    Hyperlipidemia     Hypertension     Morbid obesity ([facility] [address] [facility]     Sleep apnea     HAD SURGERY       SURGICAL HISTORY       Past Surgical History:   Procedure Laterality Date    CARDIAC SURGERY      CARDIAC CATH  GREATER THAN 5 YRS AGO    CARPAL TUNNEL RELEASE Right     COLONOSCOPY      ELBOW SURGERY Left 4/25/16    lt lateral epicondylectomy    JOINT REPLACEMENT Bilateral     KNEES    KNEE ARTHROSCOPY Bilateral     2-3x each    UPPP UVULOPALATOPHARYGOPLASTY      WISDOM TOOTH EXTRACTION         FAMILY HISTORY       Family History   Problem Relation Age of Onset    Heart Disease Mother     Diabetes Mother     Hypertension Mother     Hypertension Father        SOCIAL HISTORY       Social History     Socioeconomic History    Marital status:      Spouse name: Not on file    Number of children: Not on file    Years of education: Not on file    Highest education level: Not on file   Occupational History    Not on file   Tobacco Use    Smoking status: Never Smoker    Smokeless tobacco: Never Used   Vaping Use    Vaping Use: Never used   Substance and Sexual Activity    Alcohol use: Yes     Comment: occasionally    Drug use: No    Sexual activity: Not on file   Other Topics Concern    Not on file   Social History Narrative    Not on file     Social Determinants of Health     Financial Resource Strain:     Difficulty of Paying Living Expenses: Not on file   Food Insecurity:     Worried About Running Out of Food in the Last Year: Not on file    Elsa of Food in the Last Year: Not on file   Transportation Needs:     Lack of Transportation (Medical): Not on file    Lack of Transportation (Non-Medical): Not on file   Physical Activity:     Days of Exercise per Week: Not on file    Minutes of Exercise per Session: Not on file   Stress:     Feeling of Stress : Not on file   Social Connections:     Frequency of Communication with Friends and Family: Not on file    Frequency of Social Gatherings with Friends and Family: Not on file    Attends [employer] Services: Not on file    Active Member of [address] [place] [address] [employer] or Organizations: Not on file    Attends Club or Organization Meetings: Not on file    Marital Status: Not on file   Intimate Partner Violence:     Fear of Current or Ex-Partner: Not on file    Emotionally Abused: Not on file    Physically Abused: Not on file    Sexually Abused: Not on file   Housing Stability:     Unable to Pay for Housing in the Last Year: Not on file    Number of [place] in the Last Year: Not on file    Unstable Housing in the Last Year: Not on file        REVIEW OF SYSTEMS      No Known Allergies    No current facility-administered medications on file prior to encounter. Current Outpatient Medications on File Prior to Encounter   Medication Sig Dispense Refill    Na Sulfate-K Sulfate-Mg Sulf 17.5-3.13-1.6 GM/177ML SOLN Use as directed in your patient instructions.  1 each 0    diclofenac (VOLTAREN) 75 MG EC tablet 1 tablet with food or milk Orally Twice a day for 30      metFORMIN (GLUCOPHAGE) 500 MG tablet Take 500 mg by mouth 2 times daily (with meals)      allopurinol (ZYLOPRIM) 100 MG tablet Take 1 tablet by mouth daily 30 tablet 1    predniSONE (DELTASONE) 10 MG tablet One tab po TID x 3 days, one tab po BID x 3 days, one tab po qd x 3 days, 1/2 tab po qd x 4 days 20 tablet 0    lisinopril (PRINIVIL;ZESTRIL) 40 MG tablet Take 40 mg by mouth daily      metoprolol (TOPROL-XL) 50 MG XL tablet Take 50 mg by mouth daily      hydrochlorothiazide (HYDRODIURIL) 25 MG tablet Take 25 mg by mouth daily      aspirin 325 MG tablet Take 325 mg by mouth daily     Notation: Above medications are not currently reconciled at time of signing this H&P note, to be reconciled in pre-op per RN. Review of Systems   Constitutional: Negative for appetite change, chills, fever and unexpected weight change. HENT: Negative for congestion, ear pain, rhinorrhea and sore throat. Eyes: Positive for visual disturbance (Glasses). Respiratory: Negative for cough, shortness of breath and wheezing. Cardiovascular: Positive for palpitations (Occasional). Negative for chest pain and leg swelling. Gastrointestinal:        See HPI   Genitourinary: Negative. Musculoskeletal: Positive for arthralgias and back pain (Chronic lower back). Left hip pain   Skin:        See HPI   Neurological: Positive for headaches (Occasional). Negative for dizziness and light-headedness. Hematological: Does not bruise/bleed easily. Psychiatric/Behavioral: Negative. GENERAL PHYSICAL EXAM     Vitals: Review vitals per RN flowsheet. Physical Exam  Constitutional:       General: He is not in acute distress. Appearance: He is well-developed. He is obese. He is not ill-appearing, toxic-appearing or diaphoretic. HENT:      Head: Normocephalic and atraumatic. Nose: Nose normal. No congestion. Mouth/Throat:      Mouth: Mucous membranes are moist.      Pharynx: Oropharynx is clear. No oropharyngeal exudate or posterior oropharyngeal erythema. Eyes:      General: No scleral icterus. Right eye: No discharge. Left eye: No discharge. Pupils: Pupils are equal, round, and reactive to light. Comments: Glasses   Neck:      Trachea: No tracheal deviation. Cardiovascular:      Rate and Rhythm: Normal rate and regular rhythm. Heart sounds: Normal heart sounds. No murmur heard. No friction rub. No gallop. Pulmonary:      Effort: Pulmonary effort is normal. No respiratory distress. Breath sounds: Normal breath sounds. No wheezing, rhonchi or rales. Abdominal:      General: Bowel sounds are normal. There is no distension. Palpations: Abdomen is soft. Tenderness: There is no abdominal tenderness. There is no guarding. Musculoskeletal:         General: No swelling or tenderness. Cervical back: Neck supple. No tenderness. Right lower leg: Edema (trace) present. Left lower leg: Edema (trace) present. Skin:     General: Skin is warm and dry. Coloration: Skin is not jaundiced. Comments: Lump to base of scrotum not assessed at this time. Neurological:      General: No focal deficit present. Mental Status: He is alert and oriented to person, place, and time. Cranial Nerves: No cranial nerve deficit.       Gait: Gait normal.   Psychiatric:         Mood and Affect: Mood normal.        PROVISIONAL DIAGNOSES / SURGERY:      HISTORY OF COLONIC POLYP     COLONOSCOPY DIAGNOSTIC    Patient Active Problem List    Diagnosis Date Noted    Abnormal stress test 03/15/2019           GILDARDO Post CNP on 11/11/2021 at 7:58 AM

## 2021-11-11 NOTE — ANESTHESIA PRE PROCEDURE
Department of Anesthesiology  Preprocedure Note       Name:  Johnny Benson   Age:  63 y.o.  :  1960                                          MRN:  [id number]         Date:  2021      Surgeon: Nicolasa Andrade):  Margaret Urena MD    Procedure: Procedure(s):  COLONOSCOPY DIAGNOSTIC    Medications prior to admission:   Prior to Admission medications    Medication Sig Start Date End Date Taking?  Authorizing Provider   metFORMIN (GLUCOPHAGE) 500 MG tablet Take 500 mg by mouth 2 times daily (with meals)   Yes Historical Provider, MD   allopurinol (ZYLOPRIM) 100 MG tablet Take 1 tablet by mouth daily 20  Yes [place], DPM   predniSONE (DELTASONE) 10 MG tablet One tab po TID x 3 days, one tab po BID x 3 days, one tab po qd x 3 days, 1/2 tab po qd x 4 days 20  Yes [place] [facility], DPM   lisinopril (PRINIVIL;ZESTRIL) 40 MG tablet Take 40 mg by mouth daily   Yes Historical Provider, MD   metoprolol (TOPROL-XL) 50 MG XL tablet Take 50 mg by mouth daily   Yes Historical Provider, MD   hydrochlorothiazide (HYDRODIURIL) 25 MG tablet Take 25 mg by mouth daily   Yes Historical Provider, MD   diclofenac (VOLTAREN) 75 MG EC tablet 1 tablet with food or milk Orally Twice a day for 30    Historical Provider, MD   aspirin 325 MG tablet Take 325 mg by mouth daily    Historical Provider, MD       Current medications:    Current Facility-Administered Medications   Medication Dose Route Frequency Provider Last Rate Last Admin    lactated ringers infusion   IntraVENous Continuous Matthew Jack MD        sodium chloride flush 0.9 % injection 10 mL  10 mL IntraVENous 2 times per day Matthew Jack MD        sodium chloride flush 0.9 % injection 10 mL  10 mL IntraVENous PRN Matthew Jack MD        0.9 % sodium chloride infusion  25 mL IntraVENous PRN Matthew Jack MD        lidocaine PF 1 % injection 1 mL  1 mL IntraDERmal Once PRN Matthew Jack MD           Allergies:  No Known Allergies    Problem List:    Patient Active Problem List Diagnosis Code    Abnormal stress test R94.39       Past Medical History:        Diagnosis Date    Abnormal EKG     BY HX    Chest pain     COVID-19 vaccine administered 1-,12-    Titi Vasques and has received Booster of Pfizer     Diabetes mellitus ([facility] [address]     History of cardiac cath 03/15/2019    History of stress test 02/22/2019    Hyperlipidemia     Hypertension     Morbid obesity ([facility] [address] [facility]     Sleep apnea     HAD SURGERY       Past Surgical History:        Procedure Laterality Date    CARDIAC SURGERY      CARDIAC CATH  GREATER THAN 5 YRS AGO    CARPAL TUNNEL RELEASE Right     COLONOSCOPY      ELBOW SURGERY Left 4/25/16    lt lateral epicondylectomy    JOINT REPLACEMENT Bilateral     KNEES    KNEE ARTHROSCOPY Bilateral     2-3x each    UPPP UVULOPALATOPHARYGOPLASTY      WISDOM TOOTH EXTRACTION         Social History:    Social History     Tobacco Use    Smoking status: Never Smoker    Smokeless tobacco: Never Used   Substance Use Topics    Alcohol use: Yes     Comment: occasionally                                Counseling given: Not Answered      Vital Signs (Current):   Vitals:    11/11/21 0828   BP: 115/70   Pulse: 77   Resp: 18   Temp: 97.5 °F (36.4 °C)   TempSrc: Infrared   SpO2: 96%   Weight: 300 lb (136.1 kg)   Height: 5' 8\" (1.727 m)                                              BP Readings from Last 3 Encounters:   11/11/21 115/70   08/19/21 107/68   07/15/21 (!) 140/89       NPO Status: Time of last liquid consumption: 2200                        Time of last solid consumption: 1900                        Date of last liquid consumption: 11/10/21                        Date of last solid food consumption: 11/09/21    BMI:   Wt Readings from Last 3 Encounters:   11/11/21 300 lb (136.1 kg)   10/28/21 300 lb (136.1 kg)   08/19/21 300 lb (136.1 kg)     Body mass index is 45.61 kg/m².     CBC:   Lab Results   Component Value Date    WBC 5.6 04/26/2021    RBC 4.41 04/26/2021 HGB 15.0 04/26/2021    HCT 43.4 04/26/2021    MCV 98.4 04/26/2021    RDW 13.4 04/26/2021     04/26/2021     K 3.8    CMP:   Lab Results   Component Value Date     04/26/2021    K 3.8 04/26/2021     04/26/2021    CO2 27 04/26/2021    BUN 13 04/26/2021    CREATININE 0.83 04/26/2021    GFRAA >60 04/26/2021    LABGLOM >60 04/26/2021    GLUCOSE 101 04/26/2021    PROT 7.6 04/26/2021    CALCIUM 9.4 04/26/2021    BILITOT 0.41 04/26/2021    ALKPHOS 58 04/26/2021    AST 30 04/26/2021    ALT 28 04/26/2021       POC Tests: No results for input(s): POCGLU, POCNA, POCK, POCCL, POCBUN, POCHEMO, POCHCT in the last 72 hours. Coags:   Lab Results   Component Value Date    PROTIME 10.9 11/02/2021    INR 1.0 11/02/2021       HCG (If Applicable): No results found for: PREGTESTUR, PREGSERUM, HCG, HCGQUANT     ABGs: No results found for: PHART, PO2ART, JAP1HLS, ZNC4KCF, BEART, V1DANNNA     Type & Screen (If Applicable):  No results found for: LABABO, LABRH    Drug/Infectious Status (If Applicable):  Lab Results   Component Value Date    HEPCAB NONREACTIVE 11/02/2021       COVID-19 Screening (If Applicable):   Lab Results   Component Value Date    COVID19 Not Detected 05/04/2020           Anesthesia Evaluation  Patient summary reviewed and Nursing notes reviewed no history of anesthetic complications:   Airway: Mallampati: II  TM distance: >3 FB   Neck ROM: full  Mouth opening: > = 3 FB Dental: normal exam         Pulmonary:Negative Pulmonary ROS and normal exam  breath sounds clear to auscultation  (+) sleep apnea:      (-) pneumonia, COPD, asthma, shortness of breath, recent URI, rhonchi, wheezes, rales, stridor, not a current smoker and no decreased breath sounds          Patient did not smoke on day of surgery.                  Cardiovascular:  Exercise tolerance: good (>4 METS),   (+) hypertension: no interval change, hyperlipidemia    (-) pacemaker, valvular problems/murmurs, past MI, CAD, CABG/stent, dysrhythmias,  angina,  CHF, orthopnea, PND,  DAVEY, murmur, weak pulses,  friction rub, systolic click, carotid bruit,  JVD, peripheral edema and no pulmonary hypertension    ECG reviewed  Rhythm: regular  Rate: normal           Beta Blocker:  Dose within 24 Hrs         Neuro/Psych:   Negative Neuro/Psych ROS     (-) seizures, neuromuscular disease, TIA, CVA, headaches, psychiatric history and depression/anxiety            GI/Hepatic/Renal: Neg GI/Hepatic/Renal ROS       (-) hiatal hernia, GERD, PUD, hepatitis, liver disease, no renal disease, bowel prep and no morbid obesity       Endo/Other:    (+) DiabetesType II DM, no interval change, , no malignancy/cancer. (-) hypothyroidism, hyperthyroidism, blood dyscrasia, arthritis, no electrolyte abnormalities, no malignancy/cancer               Abdominal:             Vascular: negative vascular ROS. - PVD and DVT. Other Findings:           Anesthesia Plan      general     ASA 3       Induction: intravenous. MIPS: Postoperative opioids intended and Prophylactic antiemetics administered. Anesthetic plan and risks discussed with patient. Plan discussed with CRNA.                   Dex Monterroso MD   11/11/2021

## 2021-11-11 NOTE — ANESTHESIA POSTPROCEDURE EVALUATION
POST- ANESTHESIA EVALUATION       Pt Name: Hannah Prado  MRN: [id number]  YOB: 1960  Date of evaluation: 11/11/2021  Time:  12:01 PM      /73   Pulse 69   Temp 97.5 °F (36.4 °C) (Infrared)   Resp 16   Ht 5' 8\" (1.727 m)   Wt 300 lb (136.1 kg)   SpO2 95%   BMI 45.61 kg/m²      Consciousness Level  Awake  Cardiopulmonary Status  Stable  Pain Adequately Treated YES  Nausea / Vomiting  NO  Adequate Hydration  YES  Anesthesia Related Complications NONE      Electronically signed by Paulino Field MD on 11/11/2021 at 12:01 PM       Department of Anesthesiology  Postprocedure Note    Patient: Hannah Prado  MRN: [id number]  YOB: 1960  Date of evaluation: 11/11/2021  Time:  12:01 PM     Procedure Summary     Date: 11/11/21 Room / Location: [address] [place] [address] 04 / [address] [facility] ENDO    Anesthesia Start: 7004 Anesthesia Stop: 1011    Procedure: COLONOSCOPY POLYPECTOMY COLD BIOPSY (N/A Anus) Diagnosis: (HISTORY OF COLONIC POLYP VACCINATED)    Surgeons: Rico Chan MD Responsible Provider: Paulino Field MD    Anesthesia Type: general ASA Status: 3          Anesthesia Type: general    Ximena Phase I: Ximena Score: 10    Ximena Phase II: Ximena Score: 10    Last vitals: Reviewed and per EMR flowsheets.        Anesthesia Post Evaluation

## 2021-11-11 NOTE — OP NOTE
Operative Note  PROCEDURE NOTE    DATE OF PROCEDURE: 11/11/2021    SURGEON: Rico Chan MD  Facility : [facility]  ASSISTANT: None  Anesthesia: MAc   PREOPERATIVE DIAGNOSIS:   History of polyps    POSTOPERATIVE DIAGNOSIS: as described below    OPERATION: Total colonoscopy     ANESTHESIA: Moderate Sedation    ESTIMATED BLOOD LOSS: less than 50     COMPLICATIONS: None. SPECIMENS:  Was Obtained:   1 sessile polyp measuring around 7 mm in the sigmoid removed with cold biopsy            HISTORY: The patient is a 63y.o. year old male with history of above preop diagnosis. I recommended colonoscopy with possible biopsy or polypectomy and I explained the risk, benefits, expected outcome, and alternatives to the procedure. Risks included but are not limited to bleeding, infection, respiratory distress, hypotension, and perforation of the colon and possibility of missing a lesion. The patient understands and is in agreement. The patient was counseled at length about the risks of chucky Covid-19 during their perioperative period and any recovery window from their procedure. The patient was made aware that chucky Covid-19  may worsen their prognosis for recovering from their procedure  and lend to a higher morbidity and/or mortality risk. All material risks, benefits, and reasonable alternatives including postponing the procedure were discussed. The patient does wish to proceed with the procedure at this time. PROCEDURE: The patient was given IV conscious sedation. The patient's SPO2 remained above 90% throughout the procedure. The colonoscope was inserted per rectum and advanced under direct vision to the cecum without difficulty. Post sedation note : The patient's SPO2 remained above 90% throughout the procedure. the vital signs remained stable , and no immediate complication form the procedure noted, patient will be ready for d/c when criteria is met .         The prep was

## 2021-11-12 LAB — SURGICAL PATHOLOGY REPORT: NORMAL

## 2021-12-15 ENCOUNTER — OFFICE VISIT (OUTPATIENT)
Dept: ORTHOPEDIC SURGERY | Age: 61
End: 2021-12-15
Payer: COMMERCIAL

## 2021-12-15 DIAGNOSIS — M25.552 LEFT HIP PAIN: Primary | ICD-10-CM

## 2021-12-15 PROCEDURE — 99213 OFFICE O/P EST LOW 20 MIN: CPT | Performed by: ORTHOPAEDIC SURGERY

## 2021-12-15 NOTE — PROGRESS NOTES
Joy Piedra M.D.            [facility] [address] [facility],[address], [address] [facility]           Dept Phone: [phone]           Dept Fax:  [phone]           Alberto Paiz          Dept Phone: [phone]           Dept Fax:  [phone]      Chief Compliant:  Chief Complaint   Patient presents with    Pain     Lt hip        History of Present Illness: This is a 63 y.o. male who presents to the clinic today for evaluation / follow up of left hip pain patient is a 65-year-old gentleman who was originally seen by Dr. Tanvi Joyner for his hip. He had been contemplating a total hip arthroplasty but the patient really would like to put off for a while he was diagnosed with significant degenerative joint disease of the left hip. 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. He states that he is very busy has besides unusual work hours he has a child with Down syndrome that makes it very difficult for him to take any time away. Patient had been taken diclofenac which would have been helpful but he has not been able to get any refills from his primary care. Review of Systems   Constitutional: Negative for fever, chills, sweats. Eyes: Negative for changes in vision, or pain. HENT: Negative for ear ache, epistaxis, or sore throat. Respiratory/Cardio: Negative for Chest pain, palpitations, SOB, or cough. Gastrointestinal: Negative for abdominal pain, N/V/D. Genitourinary: Negative for dysuria, frequency, urgency, or hematuria. Neurological: Negative for headache, numbness, or weakness. Integumentary: Negative for rash, itching, laceration, or abrasion. Musculoskeletal: Positive for Pain (Lt hip)       Physical Exam:  Constitutional: Patient is oriented to person, place, and time.  Patient appears well-developed and well nourished. HENT: Negative otherwise noted  Head: Normocephalic and Atraumatic  Nose: Normal  Eyes: Conjunctivae and EOM are normal  Neck: Normal range of motion Neck supple. Respiratory/Cardio: Effort normal. No respiratory distress. Musculoskeletal:    Physical examination the patient's left hip notes a slightly antalgic gait. He has a positive Stinchfield's positive FADIR and JACKSON ER. Internal rotation is about 20 degrees X rotation about 40. He has no significant leg length discrepancy no trochanteric findings. Neurological: Patient is alert and oriented to person, place, and time. Normal strenght. No sensory deficit. Skin: Skin is warm and dry  Psychiatric: Behavior is normal. Thought content normal.  Nursing note and vitals reviewed. Labs and Imaging:     XR taken today: Patient had previous x-rays multiple views of the patient's pelvis as well as left hip which shows that were taken in July of this year show moderate to early significant degenerative joint disease of the left hip. He has significant joint space narrowing but certainly not bone-on-bone. He has very mild cam and pincer osteophytes no evidence of avascular necrosis. Patient is right hip is involvement left cell. No results found. Orders Placed This Encounter   Procedures    IR ARTHR/ASP/INJ MAJOR JT/BURSA LEFT WO US     Standing Status:   Future     Standing Expiration Date:   12/15/2022     Scheduling Instructions:      INTRA ARTICULAR INJECTION PREFERRED PROTOCOL           FOR  Left hip__________   INJECTION:                  4 cc Xylocaine, 1% plain      4 cc Marcaine, 0.5%      1 cc Depomedrol 80 mgm/cc OR              Celestone 6 mgm/cc       Assessment and Plan:  1. Left hip pain    2. Degenerative joint disease left hip greater than right  3. History of diabetes mellitus  4.       Morbid obesity BMI 45.61        This is a 63 y.o. male who presents to the clinic today for evaluation / follow up of significant DJD left hip. Past History:    Current Outpatient Medications:     diclofenac (VOLTAREN) 75 MG EC tablet, 1 tablet with food or milk Orally Twice a day for 30, Disp: , Rfl:     metFORMIN (GLUCOPHAGE) 500 MG tablet, Take 500 mg by mouth 2 times daily (with meals), Disp: , Rfl:     allopurinol (ZYLOPRIM) 100 MG tablet, Take 1 tablet by mouth daily, Disp: 30 tablet, Rfl: 1    predniSONE (DELTASONE) 10 MG tablet, One tab po TID x 3 days, one tab po BID x 3 days, one tab po qd x 3 days, 1/2 tab po qd x 4 days, Disp: 20 tablet, Rfl: 0    lisinopril (PRINIVIL;ZESTRIL) 40 MG tablet, Take 40 mg by mouth daily, Disp: , Rfl:     metoprolol (TOPROL-XL) 50 MG XL tablet, Take 50 mg by mouth daily, Disp: , Rfl:     hydrochlorothiazide (HYDRODIURIL) 25 MG tablet, Take 25 mg by mouth daily, Disp: , Rfl:     aspirin 325 MG tablet, Take 325 mg by mouth daily, Disp: , Rfl:   No Known Allergies  Social History     Socioeconomic History    Marital status:      Spouse name: Not on file    Number of children: Not on file    Years of education: Not on file    Highest education level: Not on file   Occupational History    Not on file   Tobacco Use    Smoking status: Never Smoker    Smokeless tobacco: Never Used   Vaping Use    Vaping Use: Never used   Substance and Sexual Activity    Alcohol use: Yes     Comment: occasionally    Drug use: No    Sexual activity: Not on file   Other Topics Concern    Not on file   Social History Narrative    Not on file     Social Determinants of Health     Financial Resource Strain:     Difficulty of Paying Living Expenses: Not on file   Food Insecurity:     Worried About Running Out of Food in the Last Year: Not on file    Elsa of Food in the Last Year: Not on file   Transportation Needs:     Lack of Transportation (Medical): Not on file    Lack of Transportation (Non-Medical):  Not on file   Physical Activity:     Days of Exercise per Week: Not on file    Minutes of Exercise per Session: Not on file   Stress:     Feeling of Stress : Not on file   Social Connections:     Frequency of Communication with Friends and Family: Not on file    Frequency of Social Gatherings with Friends and Family: Not on file    Attends Worship Services: Not on file    Active Member of Clubs or Organizations: Not on file    Attends Club or Organization Meetings: Not on file    Marital Status: Not on file   Intimate Partner Violence:     Fear of Current or Ex-Partner: Not on file    Emotionally Abused: Not on file    Physically Abused: Not on file    Sexually Abused: Not on file   Housing Stability:     Unable to Pay for Housing in the Last Year: Not on file    Number of [place] in the Last Year: Not on file    Unstable Housing in the Last Year: Not on file     Past Medical History:   Diagnosis Date    Abnormal EKG     BY HX    Chest pain     COVID-19 vaccine administered 1-,12-    Titi Vasques and has received Booster of Titi Vasques     Diabetes mellitus ([facility]     History of cardiac cath 03/15/2019    History of stress test 02/22/2019    Hyperlipidemia     Hypertension     Morbid obesity ([facility]     Sleep apnea     HAD SURGERY     Past Surgical History:   Procedure Laterality Date    CARDIAC SURGERY      CARDIAC CATH  GREATER THAN 5 YRS AGO    CARPAL TUNNEL RELEASE Right     COLONOSCOPY      COLONOSCOPY N/A 11/11/2021    COLONOSCOPY POLYPECTOMY COLD BIOPSY performed by Navneet Farrar MD at [address] Left 4/25/16    lt lateral epicondylectomy    JOINT REPLACEMENT Bilateral     KNEES    KNEE ARTHROSCOPY Bilateral     2-3x each    UPPP UVULOPALATOPHARYGOPLASTY      WISDOM TOOTH EXTRACTION       Family History   Problem Relation Age of Onset    Heart Disease Mother     Diabetes Mother     Hypertension Mother     Hypertension Father    Plan  Patient was made aware that he is likely to require hip arthroplasty in the future but he would like to put this off as long as possible. I also told him that he has high risk for postoperative complications given his size as well as his history of diabetes. As such I think that he would be best managed by getting set up for interventional radiology injection to his left hip. I told the patient these can be done in 4 months basis. I did recommend that he get involved in a weight loss management program as he he deftly has some significant abdominal pannus that would be postoperative problem for hip arthroplasty. Patient be set up for this we will follow up with his next 3 to 4 months      Provider Attestation:  Marni Chaudhary, personally performed the services described in this documentation. All medical record entries made by the scribe were at my direction and in my presence. I have reviewed the chart and discharge instructions and agree that the records reflect my personal performance and is accurate and complete. Sarahy Boen MD. 12/15/21      Please note that this chart was generated using voice recognition Dragon dictation software. Although every effort was made to ensure the accuracy of this automated transcription, some errors in transcription may have occurred.

## 2021-12-17 ENCOUNTER — TELEPHONE (OUTPATIENT)
Dept: INTERVENTIONAL RADIOLOGY/VASCULAR | Age: 61
End: 2021-12-17

## 2021-12-21 ENCOUNTER — HOSPITAL ENCOUNTER (OUTPATIENT)
Dept: INTERVENTIONAL RADIOLOGY/VASCULAR | Age: 61
Discharge: HOME | End: 2021-12-23
Payer: COMMERCIAL

## 2021-12-21 DIAGNOSIS — M25.552 LEFT HIP PAIN: ICD-10-CM

## 2021-12-21 PROCEDURE — 77002 NEEDLE LOCALIZATION BY XRAY: CPT

## 2021-12-21 PROCEDURE — 6360000004 HC RX CONTRAST MEDICATION: Performed by: ORTHOPAEDIC SURGERY

## 2021-12-21 PROCEDURE — 27093 INJECTION FOR HIP X-RAY: CPT

## 2021-12-21 PROCEDURE — 2709999900 HC NON-CHARGEABLE SUPPLY

## 2021-12-21 PROCEDURE — 20610 DRAIN/INJ JOINT/BURSA W/O US: CPT

## 2021-12-21 PROCEDURE — 6360000002 HC RX W HCPCS: Performed by: ORTHOPAEDIC SURGERY

## 2021-12-21 PROCEDURE — 2500000003 HC RX 250 WO HCPCS: Performed by: ORTHOPAEDIC SURGERY

## 2021-12-21 RX ORDER — METHYLPREDNISOLONE ACETATE 80 MG/ML
80 INJECTION, SUSPENSION INTRA-ARTICULAR; INTRALESIONAL; INTRAMUSCULAR; SOFT TISSUE ONCE
Status: COMPLETED | OUTPATIENT
Start: 2021-12-21 | End: 2021-12-21

## 2021-12-21 RX ORDER — BUPIVACAINE HYDROCHLORIDE 5 MG/ML
4 INJECTION, SOLUTION PERINEURAL ONCE
Status: COMPLETED | OUTPATIENT
Start: 2021-12-21 | End: 2021-12-21

## 2021-12-21 RX ORDER — LIDOCAINE HYDROCHLORIDE 10 MG/ML
4 INJECTION, SOLUTION INFILTRATION; PERINEURAL ONCE
Status: COMPLETED | OUTPATIENT
Start: 2021-12-21 | End: 2021-12-21

## 2021-12-21 RX ADMIN — METHYLPREDNISOLONE ACETATE 80 MG: 80 INJECTION, SUSPENSION INTRA-ARTICULAR; INTRALESIONAL; INTRAMUSCULAR; SOFT TISSUE at 12:50

## 2021-12-21 RX ADMIN — BUPIVACAINE HYDROCHLORIDE 20 MG: 5 INJECTION, SOLUTION EPIDURAL; INTRACAUDAL; PERINEURAL at 12:50

## 2021-12-21 RX ADMIN — IOPAMIDOL 4 ML: 755 INJECTION, SOLUTION INTRAVENOUS at 13:01

## 2021-12-21 RX ADMIN — LIDOCAINE HYDROCHLORIDE 4 ML: 10 INJECTION, SOLUTION INFILTRATION; PERINEURAL at 12:49

## 2021-12-21 ASSESSMENT — PAIN SCALES - GENERAL
PAINLEVEL_OUTOF10: 5
PAINLEVEL_OUTOF10: 5

## 2021-12-21 NOTE — PROGRESS NOTES
Pt arrives to IR for left hip injection  JR MORAN and Brenda Cole RT to [facility]  Site prepped and draped  Access obtained and medication injected per order  tolerated well  Access removed and site covered with bandaid  Dc home

## 2021-12-21 NOTE — BRIEF OP NOTE
Brief Postoperative Note    Johnny Benson  YOB: 1960  [id number]    Pre-operative Diagnosis: Left hip pain    Post-operative Diagnosis: Same    Procedure: Left hip injection    Anesthesia: Local    Surgeons/Assistants: DEAN Gabriel    Estimated Blood Loss: less than 50     Complications: None    Specimens: Was Not Obtained    Findings: Successful left hip injection.     Electronically signed by DEAN Mclean on 12/21/2021 at 12:54 PM

## 2022-02-21 NOTE — DISCHARGE SUMMARY
[] Oneil Whitehead        Outpatient Physical                Therapy       [address]       Phone: [phone]       Fax: [phone] [x] [facility]       Promotion at [address] [facility]       Phone: [phone]       Fax: [phone] [] NilscedRoberto Rheagorge Joiner      [facility] Promotion     [address] [facility]     Phone: [phone]     Fax:  [phone]     Physical Therapy Discharge Note    Date: 2022      Patient: Madan Collins  : 1960  MRN: [id number]    Physician: Dr. Thomas Danielle, Therese Avalos CNP     Insurance: [employer] [place] (unlimited visits, medical review after 25 vs)   Medical Diagnosis: L hip OA                        Rehab Codes: M25.552, R29.3, R26.2, M25.55, M25.65, R26.0  Onset date: 2021                   Next 's appt.: 21  Visit# / total visits:  ; Progress note for Medicare patient due at visit 6                                     Cancels/No Shows: 0  Date of initial visit: 21                Date of final visit: 21       Discharge Status:     Pt failed to make additional appointments for therapy after being put on hold per pt request for 1 mo. Pt. Is now discharged. Electronically signed by: Brett Lim PT    If you have any questions or concerns, please don't hesitate to call.   Thank you for your referral.

## 2022-03-23 ENCOUNTER — OFFICE VISIT (OUTPATIENT)
Dept: PODIATRY | Age: 62
End: 2022-03-23
Payer: COMMERCIAL

## 2022-03-23 VITALS — WEIGHT: 300 LBS | HEIGHT: 68 IN | BODY MASS INDEX: 45.47 KG/M2 | RESPIRATION RATE: 16 BRPM

## 2022-03-23 DIAGNOSIS — E11.42 DIABETIC POLYNEUROPATHY ASSOCIATED WITH TYPE 2 DIABETES MELLITUS (HCC): ICD-10-CM

## 2022-03-23 DIAGNOSIS — R60.0 EDEMA OF LOWER EXTREMITY: Primary | ICD-10-CM

## 2022-03-23 PROCEDURE — 99213 OFFICE O/P EST LOW 20 MIN: CPT | Performed by: PODIATRIST

## 2022-03-23 NOTE — PROGRESS NOTES
[address] [facility] PODIATRY [facility]  [address] [place] [address]  Dept: [phone]  Dept Fax: [phone]    RETURN PATIENT PROGRESS NOTE  Date of patient's visit: 3/23/2022  Patient's Name:  Jaswant Stephens YOB: 1960            Patient Care Team:  Emma Can DO as PCP - General  Emma Can DO as PCP - [facility] Empaneled Provider  Josee Maza DPM as Physician (Podiatry)  Annabella Goetz MD as Consulting Physician (Gastroenterology)       Jaswant Stephens 63 y.o. male that presents for follow-up of   Chief Complaint   Patient presents with    Foot Pain    Foot Swelling     Pt's primary care physician is Emma Can DO last seen 01/25/2022  Symptoms began 1 year(s) ago and are unchanged . Patient relates pain is Present. He states he has noticed increased swelling to rylie LE Pain is rated 1 out of 10 and is described as constant, moderate. Treatments prior to today's visit include: none. Currently denies F/C/N/V. No Known Allergies    Past Medical History:   Diagnosis Date    Abnormal EKG     BY HX    Chest pain     COVID-19 vaccine administered 1-,12-    Pfizer and has received Booster of Pfizer     Diabetes mellitus ([facility]     History of cardiac cath 03/15/2019    History of stress test 02/22/2019    Hyperlipidemia     Hypertension     Morbid obesity ([facility]     Sleep apnea     HAD SURGERY       Prior to Admission medications    Medication Sig Start Date End Date Taking?  Authorizing Provider   diclofenac (VOLTAREN) 75 MG EC tablet 1 tablet with food or milk Orally Twice a day for 30   Yes Historical Provider, MD   allopurinol (ZYLOPRIM) 100 MG tablet Take 1 tablet by mouth daily 4/17/20  Yes Josee Maza DPM   lisinopril (PRINIVIL;ZESTRIL) 40 MG tablet Take 40 mg by mouth daily   Yes Historical Provider, MD   metoprolol (TOPROL-XL) 50 MG XL tablet Take 50 mg by mouth daily Yes Historical Provider, MD   hydrochlorothiazide (HYDRODIURIL) 25 MG tablet Take 25 mg by mouth daily   Yes Historical Provider, MD   aspirin 325 MG tablet Take 325 mg by mouth daily   Yes Historical Provider, MD   metFORMIN (GLUCOPHAGE) 500 MG tablet Take 500 mg by mouth 2 times daily (with meals)   Patient not taking: Reported on 3/23/2022    Historical Provider, MD   predniSONE (DELTASONE) 10 MG tablet One tab po TID x 3 days, one tab po BID x 3 days, one tab po qd x 3 days, 1/2 tab po qd x 4 days  Patient not taking: Reported on 3/23/2022 1/9/20   Ronnie Zaragoza DPM       Review of Systems    Review of Systems:  History obtained from chart review and the patient  General ROS: negative for - chills, fatigue, fever, night sweats or weight gain  Constitutional: Negative for chills, diaphoresis, fatigue, fever and unexpected weight change. Musculoskeletal: Positive for arthralgias, gait problem and joint swelling. Neurological ROS: negative for - behavioral changes, confusion, headaches or seizures. Negative for weakness and numbness. Dermatological ROS: negative for - mole changes, rash  Cardiovascular: Negative for leg swelling. Gastrointestinal: Negative for constipation, diarrhea, nausea and vomiting. Lower Extremity Physical Examination:     Vitals:   Vitals:    03/23/22 1044   Resp: 16     General: AAO x 3 in NAD. Dermatologic Exam:  Skin lesion/ulceration Absent . Skin No rashes or nodules noted. .   Skin is thin, with flaky sloughing skin as well as decreased hair growth to the lower leg  Small red hemosiderin deposits seen dorsal foot   Musculoskeletal:     1st MPJ ROM decreased, Bilateral.  Muscle strength 5/5, Bilateral.  Pain present upon palpation of toenails 1-5, Bilateral. decreased medial longitudinal arch, Bilateral.  Ankle ROM decreased,Bilateral.    Dorsally contracted digits present digits 2, Bilateral.     Vascular: DP pulses 1/4 bilateral.  PT pulses 0/4 bilateral.   CFT <5 seconds, Bilateral.  Hair growth absent to the level of the digits, Bilateral.  Edema present, Bilateral.  Varicosities absent, Bilateral. Erythema absent, Bilateral    Neurological: Sensation diminshed to light touch to level of digits, Bilateral.  Protective sensation intact 6/10 sites via 5.07/10g [place]-Ryann Monofilament, Bilateral.  negative Tinel's, Bilateral.  negative Valleix sign, Bilateral.      Integument: Warm, dry, supple, Bilateral.  Open lesion absent, Bilateral.  Interdigital maceration absent to web spaces 4, Bilateral.  Nails 1-5 left and 1-5 right thickened > 3.0 mm, dystrophic and crumbly, discolored with yellow subungual debris. Fissures absent, Bilateral.       Asessment: Patient is a 63 y.o. male with:    Diagnosis Orders   1. Edema of lower extremity     2. Diabetic polyneuropathy associated with type 2 diabetes mellitus ([facility]           Plan: Patient examined and evaluated. Current condition and treatment options discussed in detail. Diabetes education provided today. To address new complaint of increased swelling, recommend patient to wear compression stockings and  instructed pt to wear at all times when walking. Pt may take NSAIDs as needed. Discussed importance of DM foot care. Recommend routine DM foot check. Avoid walking barefoot. All labs were reviewed and all imagining including the above findings were reviewed PRIOR to the patients arrival and with the patient today. Previous patient encounter was reviewed. Encounters from the patients other medical providers were reviewed and noted. Time was spent educating the patient on proper care of the feet and ankles. All the above diagnosis were addressed at todays visit and all questions were answered. A total of 20 minutes was spent with this patients encounter which included charting after the patients visit     Verbal and written instructions given to patient. Contact office with any questions/problems/concerns. No orders of the defined types were placed in this encounter. No orders of the defined types were placed in this encounter.        RTC in 3month(s).    3/23/2022      Electronically signed by Adelina Mcneil DPM on 3/23/2022 at 10:46 AM  3/23/2022

## 2022-10-25 ENCOUNTER — HOSPITAL ENCOUNTER (EMERGENCY)
Age: 62
Discharge: HOME | End: 2022-10-25
Attending: EMERGENCY MEDICINE
Payer: COMMERCIAL

## 2022-10-25 ENCOUNTER — APPOINTMENT (OUTPATIENT)
Dept: CT IMAGING | Age: 62
End: 2022-10-25
Payer: COMMERCIAL

## 2022-10-25 VITALS
HEIGHT: 67 IN | BODY MASS INDEX: 47.09 KG/M2 | HEART RATE: 81 BPM | DIASTOLIC BLOOD PRESSURE: 106 MMHG | WEIGHT: 300 LBS | SYSTOLIC BLOOD PRESSURE: 174 MMHG | OXYGEN SATURATION: 90 % | RESPIRATION RATE: 18 BRPM | TEMPERATURE: 98.7 F

## 2022-10-25 DIAGNOSIS — G51.0 BELL'S PALSY: Primary | ICD-10-CM

## 2022-10-25 LAB
ABSOLUTE EOS #: 0.1 K/UL (ref 0–0.44)
ABSOLUTE EOS #: ABNORMAL K/UL
ABSOLUTE IMMATURE GRANULOCYTE: <0.03 K/UL (ref 0–0.3)
ABSOLUTE IMMATURE GRANULOCYTE: ABNORMAL K/UL (ref 0–0.3)
ABSOLUTE LYMPH #: 1.07 K/UL (ref 1.1–3.7)
ABSOLUTE LYMPH #: ABNORMAL K/UL
ABSOLUTE MONO #: 0.61 K/UL (ref 0.1–1.2)
ABSOLUTE MONO #: ABNORMAL K/UL
ANION GAP SERPL CALCULATED.3IONS-SCNC: 14 MMOL/L (ref 9–17)
ANION GAP: 10 MMOL/L (ref 7–16)
BASOPHILS # BLD: 0 % (ref 0–2)
BASOPHILS # BLD: ABNORMAL %
BASOPHILS ABSOLUTE: <0.03 K/UL (ref 0–0.2)
BASOPHILS ABSOLUTE: ABNORMAL K/UL (ref 0–0.2)
BUN BLDV-MCNC: 19 MG/DL (ref 8–23)
CALCIUM SERPL-MCNC: 9.2 MG/DL (ref 8.6–10.4)
CHLORIDE BLD-SCNC: 99 MMOL/L (ref 98–107)
CO2: 25 MMOL/L (ref 20–31)
CREAT SERPL-MCNC: 0.96 MG/DL (ref 0.7–1.2)
DIFFERENTIAL TYPE: ABNORMAL
EGFR, POC: >60 ML/MIN/1.73M2
EOSINOPHILS RELATIVE PERCENT: 2 % (ref 1–4)
EOSINOPHILS RELATIVE PERCENT: ABNORMAL %
GFR SERPL CREATININE-BSD FRML MDRD: >60 ML/MIN/1.73M2
GLUCOSE BLD-MCNC: 105 MG/DL (ref 70–99)
GLUCOSE BLD-MCNC: 105 MG/DL (ref 74–100)
HCO3 VENOUS: 29.8 MMOL/L (ref 22–29)
HCT VFR BLD CALC: 40.9 % (ref 40.7–50.3)
HCT VFR BLD CALC: ABNORMAL %
HEMOGLOBIN: 14.5 G/DL (ref 13–17)
HEMOGLOBIN: ABNORMAL G/DL
IMMATURE GRANULOCYTES: 0 %
IMMATURE GRANULOCYTES: ABNORMAL %
INR BLD: 1.1
INR BLD: ABNORMAL
LYMPHOCYTES # BLD: 20 % (ref 24–43)
LYMPHOCYTES # BLD: ABNORMAL %
MCH RBC QN AUTO: 32.9 PG (ref 25.2–33.5)
MCH RBC QN AUTO: ABNORMAL PG
MCHC RBC AUTO-ENTMCNC: 35.5 G/DL (ref 28.4–34.8)
MCHC RBC AUTO-ENTMCNC: ABNORMAL G/DL
MCV RBC AUTO: 92.7 FL (ref 82.6–102.9)
MCV RBC AUTO: ABNORMAL FL
MONOCYTES # BLD: 11 % (ref 3–12)
MONOCYTES # BLD: ABNORMAL %
MYOGLOBIN: 172 NG/ML (ref 28–72)
NRBC AUTOMATED: 0 PER 100 WBC
NRBC AUTOMATED: ABNORMAL PER 100 WBC
O2 SAT, VEN: 68 % (ref 60–85)
PARTIAL THROMBOPLASTIN TIME: 27.7 SEC (ref 20.5–30.5)
PARTIAL THROMBOPLASTIN TIME: ABNORMAL SEC
PCO2, VEN: 49.9 MM HG (ref 41–51)
PDW BLD-RTO: 13.9 % (ref 11.8–14.4)
PDW BLD-RTO: ABNORMAL %
PH VENOUS: 7.38 (ref 7.32–7.43)
PLATELET # BLD: 173 K/UL (ref 138–453)
PLATELET # BLD: ABNORMAL K/UL
PLATELET ESTIMATE: ABNORMAL
PMV BLD AUTO: 9.8 FL (ref 8.1–13.5)
PMV BLD AUTO: ABNORMAL FL
PO2, VEN: 36.5 MM HG (ref 30–50)
POC BUN: 20 MG/DL (ref 8–26)
POC CHLORIDE: 102 MMOL/L (ref 98–107)
POC CREATININE: 1.25 MG/DL (ref 0.51–1.19)
POC HEMATOCRIT: 52 % (ref 41–53)
POC HEMOGLOBIN: 17.6 G/DL (ref 13.5–17.5)
POC IONIZED CALCIUM: 1.15 MMOL/L (ref 1.15–1.33)
POC LACTIC ACID: 1.44 MMOL/L (ref 0.56–1.39)
POC POTASSIUM: 4.1 MMOL/L (ref 3.5–4.5)
POC SODIUM: 141 MMOL/L (ref 138–146)
POC TCO2: 30 MMOL/L (ref 22–30)
POSITIVE BASE EXCESS, VEN: 3 (ref 0–3)
POTASSIUM SERPL-SCNC: 4.1 MMOL/L (ref 3.7–5.3)
PROTHROMBIN TIME: 11.6 SEC (ref 9.1–12.3)
PROTHROMBIN TIME: ABNORMAL SEC
RBC # BLD: 4.41 M/UL (ref 4.21–5.77)
RBC # BLD: ABNORMAL 10*6/UL
RBC # BLD: ABNORMAL M/UL
REASON FOR REJECTION: NORMAL
SEG NEUTROPHILS: 67 % (ref 36–65)
SEG NEUTROPHILS: ABNORMAL %
SEGMENTED NEUTROPHILS ABSOLUTE COUNT: 3.64 K/UL (ref 1.5–8.1)
SEGMENTED NEUTROPHILS ABSOLUTE COUNT: ABNORMAL K/UL
SODIUM BLD-SCNC: 138 MMOL/L (ref 135–144)
TOTAL CK: 568 U/L (ref 39–308)
TROPONIN, HIGH SENSITIVITY: 16 NG/L (ref 0–22)
WBC # BLD: 5.5 K/UL (ref 3.5–11.3)
WBC # BLD: ABNORMAL 10*3/UL
WBC # BLD: ABNORMAL K/UL
ZZ NTE CLEAN UP: ORDERED TEST: NORMAL
ZZ NTE WITH NAME CLEAN UP: SPECIMEN SOURCE: NORMAL

## 2022-10-25 PROCEDURE — 83605 ASSAY OF LACTIC ACID: CPT

## 2022-10-25 PROCEDURE — 82330 ASSAY OF CALCIUM: CPT

## 2022-10-25 PROCEDURE — 70496 CT ANGIOGRAPHY HEAD: CPT

## 2022-10-25 PROCEDURE — 80051 ELECTROLYTE PANEL: CPT

## 2022-10-25 PROCEDURE — 99285 EMERGENCY DEPT VISIT HI MDM: CPT

## 2022-10-25 PROCEDURE — 85025 COMPLETE CBC W/AUTO DIFF WBC: CPT

## 2022-10-25 PROCEDURE — 82550 ASSAY OF CK (CPK): CPT

## 2022-10-25 PROCEDURE — 82553 CREATINE MB FRACTION: CPT

## 2022-10-25 PROCEDURE — 85730 THROMBOPLASTIN TIME PARTIAL: CPT

## 2022-10-25 PROCEDURE — 83874 ASSAY OF MYOGLOBIN: CPT

## 2022-10-25 PROCEDURE — 84520 ASSAY OF UREA NITROGEN: CPT

## 2022-10-25 PROCEDURE — 84484 ASSAY OF TROPONIN QUANT: CPT

## 2022-10-25 PROCEDURE — 93005 ELECTROCARDIOGRAM TRACING: CPT | Performed by: EMERGENCY MEDICINE

## 2022-10-25 PROCEDURE — 85610 PROTHROMBIN TIME: CPT

## 2022-10-25 PROCEDURE — 6360000004 HC RX CONTRAST MEDICATION: Performed by: EMERGENCY MEDICINE

## 2022-10-25 PROCEDURE — 70450 CT HEAD/BRAIN W/O DYE: CPT

## 2022-10-25 PROCEDURE — 82947 ASSAY GLUCOSE BLOOD QUANT: CPT

## 2022-10-25 PROCEDURE — 99204 OFFICE O/P NEW MOD 45 MIN: CPT | Performed by: PSYCHIATRY & NEUROLOGY

## 2022-10-25 PROCEDURE — 80048 BASIC METABOLIC PNL TOTAL CA: CPT

## 2022-10-25 PROCEDURE — 82565 ASSAY OF CREATININE: CPT

## 2022-10-25 PROCEDURE — 82803 BLOOD GASES ANY COMBINATION: CPT

## 2022-10-25 PROCEDURE — 85014 HEMATOCRIT: CPT

## 2022-10-25 RX ORDER — VALACYCLOVIR HYDROCHLORIDE 1 G/1
1000 TABLET, FILM COATED ORAL 3 TIMES DAILY
Qty: 21 TABLET | Refills: 0 | Status: SHIPPED | OUTPATIENT
Start: 2022-10-25 | End: 2022-11-01

## 2022-10-25 RX ORDER — PREDNISONE 20 MG/1
60 TABLET ORAL DAILY
Qty: 21 TABLET | Refills: 0 | Status: SHIPPED | OUTPATIENT
Start: 2022-10-25 | End: 2022-11-01

## 2022-10-25 RX ADMIN — IOPAMIDOL 90 ML: 755 INJECTION, SOLUTION INTRAVENOUS at 11:37

## 2022-10-25 ASSESSMENT — ENCOUNTER SYMPTOMS
NAUSEA: 0
ABDOMINAL PAIN: 0
COUGH: 0
DIARRHEA: 0
VOMITING: 0
BACK PAIN: 0
SORE THROAT: 0
SHORTNESS OF BREATH: 0

## 2022-10-25 ASSESSMENT — PAIN - FUNCTIONAL ASSESSMENT: PAIN_FUNCTIONAL_ASSESSMENT: 0-10

## 2022-10-25 ASSESSMENT — PAIN DESCRIPTION - ORIENTATION: ORIENTATION: RIGHT

## 2022-10-25 ASSESSMENT — PAIN DESCRIPTION - ONSET: ONSET: ON-GOING

## 2022-10-25 ASSESSMENT — PAIN DESCRIPTION - FREQUENCY: FREQUENCY: CONTINUOUS

## 2022-10-25 ASSESSMENT — PAIN DESCRIPTION - LOCATION: LOCATION: HEAD

## 2022-10-25 ASSESSMENT — PAIN DESCRIPTION - PAIN TYPE: TYPE: ACUTE PAIN

## 2022-10-25 ASSESSMENT — PAIN DESCRIPTION - DESCRIPTORS: DESCRIPTORS: ACHING

## 2022-10-25 ASSESSMENT — PAIN SCALES - GENERAL: PAINLEVEL_OUTOF10: 6

## 2022-10-25 NOTE — PROGRESS NOTES
[address] [facility] [address]     Emergency/Trauma Note    PATIENT NAME: Tony Stratton    Shift date: 10/25/2022   Shift day: Tuesday   Shift # 1    Room # 08/08   Name: Tony Stratton            Age: 63 y.o. Gender: male          Synagogue: [employer] [address] [employer]:     Trauma/Incident type:  Stroke Consult  Admit Date & Time: 10/25/2022 10:56 AM  TRAUMA NAME:     ADVANCE DIRECTIVES IN CHART? No    NAME OF DECISION MAKER:     RELATIONSHIP OF DECISION MAKER TO PATIENT:     PATIENT/EVENT DESCRIPTION:  Tony Stratton is a 63 y.o. male who was paged out as a stroke consult. Pt to be admitted to 08/08. SPIRITUAL ASSESSMENT-INTERVENTION-OUTCOME:  Pt and wife were present. They both were calm and expressed that they had no needs from .  provided a supportive presence. PATIENT BELONGINGS:  No belongings noted    ANY BELONGINGS OF SIGNIFICANT VALUE NOTED:      REGISTRATION STAFF NOTIFIED? No      WHAT IS YOUR SPIRITUAL CARE PLAN FOR THIS PATIENT?:   Chaplains will remain available to offer spiritual and emotional support as needed.      Electronically signed by Trena Coleman on 10/25/2022 at [address] [place] [address]  [phone]        10/25/22 1306   Encounter Summary   Service Provided For: Patient and family together   Referral/Consult From: Multi-disciplinary team   Support System Spouse   Last Encounter  10/25/22   Complexity of Encounter Low   Begin Time 1210   End Time  1215   Total Time Calculated 5 min   Crisis   Type Code Stroke   Assessment/Intervention/Outcome   Assessment Calm   Intervention Explored/Affirmed feelings, thoughts, concerns   Outcome Expressed feelings, needs, and concerns

## 2022-10-25 NOTE — ED NOTES
Pt presents to the ED with c/o of right eye problem. Pt states on Sunday he woke up with headache and right eye watering. 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 Pt states that he has been using visine for his eye with no improvement. Pt states when he got off work he noticed that his lip was drooping. Pt states this has never happened before. Pt states he has pain 6/10 in his head above right eye, denies taking anything for pain PTA. Pt placed on full cardiac monitor Call light in reach, white board updated.        Edita Burkett RN  10/25/22 7490

## 2022-10-25 NOTE — CONSULTS
Department of Endovascular Neurosurgery  Resident Consult Note  Stroke Consult paged @ 1103AM  ER Room # 8  Arrival to patient bedside @ 1112AM        Reason for Consult:  stroke  Requesting Physician:  Dr. Kamini Hercules  Endovascular Neurosurgeon:   [x]Dr. Dorinda Rowe  []Dr. Moshe Marti  []Dr. Daniel Judd   []    History Obtained From:  patient    CHIEF COMPLAINT:       Right eye watering and rt facial droop    HISTORY OF PRESENT ILLNESS:       The patient is a 63 y.o. male with hx of HTN, DM, Gout, Morbid obesity who presents with the following complaints with onset on Sunday afternoon. He has a hx of neck spasms and felt like his throat \"locked up\". Denies any dysphagia but felt the symptom for approximately 10 seconds. He later felt his right eye watering and feeling red. Next day he felt some rt sided facial pain. He has been using visine drops for his eyes without significant improvement. Denies any allergies or hx of allergic rhinitis. He works here at [employer] in [employer] and got off work around [address] [place] [address] and did not feel any change in symptoms so decided to come in.     Last know well: 10/23/2022 afternoon    On presentation:  BP: 154/98  BSL: 105    Prior to arrival patient was on  Antiplatelets/anticoagulants: ASA 81  Statins: no    Smoking history: non-smoker       PAST MEDICAL HISTORY :       Past Medical History:        Diagnosis Date    Abnormal EKG     BY HX    Chest pain     COVID-19 vaccine administered 1-,12-    Pfizer and has received Booster of Pfizer     Diabetes mellitus ([address]     History of cardiac cath 03/15/2019    History of stress test 02/22/2019    Hyperlipidemia     Hypertension     Morbid obesity ([address]     Sleep apnea     HAD SURGERY       Past Surgical History:        Procedure Laterality Date    CARDIAC SURGERY      CARDIAC CATH  GREATER THAN 5 YRS AGO    CARPAL TUNNEL RELEASE Right     COLONOSCOPY      COLONOSCOPY N/A 11/11/2021    COLONOSCOPY POLYPECTOMY COLD BIOPSY performed by Deidre Yeung MD at [address] Left 4/25/16    lt lateral epicondylectomy    JOINT REPLACEMENT Bilateral     KNEES    KNEE ARTHROSCOPY Bilateral     2-3x each    UPPP UVULOPALATOPHARYGOPLASTY      WISDOM TOOTH EXTRACTION         Social History:   Social History     Socioeconomic History    Marital status:      Spouse name: Not on file    Number of children: Not on file    Years of education: Not on file    Highest education level: Not on file   Occupational History    Not on file   Tobacco Use    Smoking status: Never    Smokeless tobacco: Never   Vaping Use    Vaping Use: Never used   Substance and Sexual Activity    Alcohol use: Yes     Comment: occasionally    Drug use: No    Sexual activity: Not on file   Other Topics Concern    Not on file   Social History Narrative    Not on file     Social Determinants of Health     Financial Resource Strain: Not on file   Food Insecurity: Not on file   Transportation Needs: Not on file   Physical Activity: Not on file   Stress: Not on file   Social Connections: Not on file   Intimate Partner Violence: Not on file   Housing Stability: Not on file       Family History:       Problem Relation Age of Onset    Heart Disease Mother     Diabetes Mother     Hypertension Mother     Hypertension Father        Allergies:  Patient has no known allergies. Home Medications:  Prior to Admission medications    Medication Sig Start Date End Date Taking?  Authorizing Provider   Compression Stockings MISC 15-20 mm/hg 3/23/22   Erik Paredes DPM   diclofenac (VOLTAREN) 75 MG EC tablet 1 tablet with food or milk Orally Twice a day for 30    Historical Provider, MD   metFORMIN (GLUCOPHAGE) 500 MG tablet Take 500 mg by mouth 2 times daily (with meals)   Patient not taking: Reported on 3/23/2022    Historical Provider, MD   allopurinol (ZYLOPRIM) 100 MG tablet Take 1 tablet by mouth daily 4/17/20   Erik Paredes DPM   predniSONE (DELTASONE) 10 MG tablet One tab po TID x 3 days, one tab po BID x 3 days, one tab po qd x 3 days, 1/2 tab po qd x 4 days  Patient not taking: Reported on 3/23/2022 1/9/20   Deshaun Britton DPM   lisinopril (PRINIVIL;ZESTRIL) 40 MG tablet Take 40 mg by mouth daily    Historical Provider, MD   metoprolol (TOPROL-XL) 50 MG XL tablet Take 50 mg by mouth daily    Historical Provider, MD   hydrochlorothiazide (HYDRODIURIL) 25 MG tablet Take 25 mg by mouth daily    Historical Provider, MD   aspirin 325 MG tablet Take 325 mg by mouth daily    Historical Provider, MD       Current Medications:   No current facility-administered medications for this encounter. REVIEW OF SYSTEMS:       As per HPI  CONSTITUTIONAL: negative for fatigue and malaise   EYES: negative for double vision and photophobia    HEENT: negative for tinnitus and sore throat   RESPIRATORY: negative for cough, shortness of breath   CARDIOVASCULAR: negative for chest pain, palpitations   GASTROINTESTINAL: negative for nausea, vomiting   GENITOURINARY: negative for incontinence   MUSCULOSKELETAL: negative for neck or back pain   NEUROLOGICAL: negative for seizures   PSYCHIATRIC: negative for fatigue     Review of systems otherwise negative. PHYSICAL EXAM:       BP (!) 154/98   Pulse 85   Temp 98.7 °F (37.1 °C) (Oral)   Resp 18   Ht 5' 7\" (1.702 m)   Wt 300 lb (136.1 kg)   SpO2 98%   BMI 46.99 kg/m²     CONSTITUTIONAL:  Well developed, well nourished, alert and oriented x 3, in no acute distress. GCS 15, nontoxic. No dysarthria, no aphasia.    HEAD:  normocephalic, atraumatic    EYES:  PERRLA, EOMI.   ENT:  moist mucous membranes   NECK:  supple, symmetric, no midline tenderness to palpation    BACK:  without midline tenderness, step-offs or deformities    LUNGS:  Equal air entry bilaterally   CARDIOVASCULAR:  normal s1 / s2   ABDOMEN:  Soft, no rigidity   NEUROLOGIC:  Mental Status:  A & O x3,awake             Cranial Nerves:    cranial nerves II-XII are grossly intact except for mild rt facial droop    Motor Exam:    Drift:  absent  Tone:  normal    Motor exam is symmetrical 5 out of 5 all extremities bilaterally    Sensory:    Touch:    Right Upper Extremity:  normal  Left Upper Extremity:  normal  Right Lower Extremity:  normal  Left Lower Extremity:  normal    Deep Tendon Reflexes:    Right Bicep:  2+  Left Bicep:  2+  Right Knee:  2+  Left Knee:  2+    Plantar Response:  Right:  downgoing  Left:  downgoing    Clonus:  N/A  Ceron's:  N/A    Coordination/Dysmetria:  Heel to Shin:  Right:  normal  Left:  normal  Finger to Nose:   Right:  normal  Left:  normal   Dysdiadochokinesia:  N/A    Gait:  not tested due to condition    INITIAL NIH STROKE SCALE:    Time Performed:  1115 AM     1a. Level of consciousness:  0 - alert; keenly responsive  1b. Level of consciousness questions:  0 - answers both questions correctly  1c. Level of consciousness questions:  0 - performs both tasks correctly  2. Best Gaze:  0 - normal  3. Visual:  0 - no visual loss  4. Facial Palsy:  2 - partial paralysis (total or near total paralysis of the lower face)  5a. Motor left arm:  0 - no drift, limb holds 90 (or 45) degrees for full 10 seconds  5b. Motor right arm:  0 - no drift, limb holds 90 (or 45) degrees for full 10 seconds  6a. Motor left le - no drift; leg holds 30 degree position for full 5 seconds  6b. Motor right le - no drift; leg holds 30 degree position for full 5 seconds  7. Limb Ataxia:  0 - absent  8. Sensory:  0 - normal; no sensory loss  9. Best Language:  0 - no aphasia, normal  10. Dysarthria:  0 - normal  11.   Extinction and Inattention:  0 - no abnormality    TOTAL:  2     SKIN:  no rash      Modified Moro Score Scale:     [x] Zero: No symptoms at all   [] 1: No significant disability despite symptoms; able to carry out all usual duties and activities   [] 2: Slight disability; unable to carry out all previous activities, but able to look after own affairs without assistance   [] 3:Moderate disability; requiring some help, but able to walk without assistance   [] 4: Moderately severe disability; unable to walk and attend to bodily needs without assistance   [] 5:Severe disability; bedridden, incontinent and requiring constant nursing care and attention    LABS AND IMAGING:     CBC with Differential:    Lab Results   Component Value Date/Time    WBC 5.6 04/26/2021 05:06 PM    RBC 4.41 04/26/2021 05:06 PM    HGB 15.0 04/26/2021 05:06 PM    HCT 43.4 04/26/2021 05:06 PM     04/26/2021 05:06 PM    MCV 98.4 04/26/2021 05:06 PM    MCH 34.0 04/26/2021 05:06 PM    MCHC 34.6 04/26/2021 05:06 PM    RDW 13.4 04/26/2021 05:06 PM    LYMPHOPCT 22 04/26/2021 05:06 PM    MONOPCT 10 04/26/2021 05:06 PM    BASOPCT 0 04/26/2021 05:06 PM    MONOSABS 0.57 04/26/2021 05:06 PM    LYMPHSABS 1.24 04/26/2021 05:06 PM    EOSABS 0.13 04/26/2021 05:06 PM    BASOSABS <0.03 04/26/2021 05:06 PM    DIFFTYPE NOT REPORTED 04/26/2021 05:06 PM         BMP:    Lab Results   Component Value Date/Time     04/26/2021 05:06 PM    K 3.8 04/26/2021 05:06 PM     04/26/2021 05:06 PM    CO2 27 04/26/2021 05:06 PM    BUN 13 04/26/2021 05:06 PM    LABALBU 4.6 04/26/2021 05:06 PM    CREATININE 1.25 10/25/2022 11:05 AM    CREATININE 0.83 04/26/2021 05:06 PM    CALCIUM 9.4 04/26/2021 05:06 PM    GFRAA >60 04/26/2021 05:06 PM    LABGLOM >60 04/26/2021 05:06 PM    GLUCOSE 101 04/26/2021 05:06 PM       Radiology Review:    1.) CT brain without contrast:  Impression   No acute intracranial abnormality. These results were sent to the Results [address] ([address] [facility]) on   10/25/2022 at 11:55 am to be communicated to the referring/covering health   care provider/office. 2.) CTA Head/Neck:   Impression   1. No flow limiting stenosis of the cervical carotid/vertebral arteries. 2. No significant stenosis of the opoild-eh-Giyupa.    These results were sent to the Results [address] ([address]) on   10/25/2022 at 12:04 pm to be communicated to the referring/covering health   care provider/office. 3.) Brain MRI W/O:       ASSESSMENT AND PLAN:       Patient Active Problem List   Diagnosis    Abnormal stress test       Assessment                 63 y.o. male with hx of HTN, DM, Gout, Morbid obesity who presents with the following complaints with onset on Sunday afternoon. He has a hx of neck spasms and felt like his throat \"locked up\". Rt eye redness  Mild rt facial droop  Symptoms consistent with Bell's Palsy  + sugar test with Rt side of tongue with no response and left side sweet    Last Known Well (date and time): 10/23/2022 afternoon    2. Candidate for IV Tenecteplase therapy     Yes []     No  [x] due to the following exclusion criteria: out of window    3. Candidate for Thrombectomy    Yes []      No [] due to the following exclusion criteria: out of window    - Discussed with Dr. Mayen Moriah     Recommendations:      Discharge on PO steroids and antiviral x 1 week  [facility]  Prednisone 20mg tab to be taken as 60mg dosing (3 tabs) once daily x 7 days  Valtrex 1g to be taken three times a day x 7 days  Discussed eye care  Outpatient follow up with PCP and Neurology      Additional recommendations may follow    Please contact [facility] with any changes in patients neurologic status. Thank you for your consult.        Андрей Shipman MD   PGY 4 Neurology Resident  10/25/2022 at 11:39 AM

## 2022-10-25 NOTE — ED NOTES
The following labs were re-drawn and labeled with pt sticker and tubed to lab:     [x] Blue     [x] Lavender   [] on ice  [x] Green/yellow  [x] Green/black [] on ice  [] Yellow  [] Red  [] Pink      [] COVID-19 swab    [] Rapid  [] PCR  [] Flu Swab  [] Strep Swab  [] Peds Viral Panel     [] Urine Sample  [] Pelvic Cultures  [] Blood Cultures   [] Wound Cultures          Hema Deras RN  10/25/22 4927

## 2022-10-25 NOTE — ED NOTES
The following labs labeled with pt sticker and tubed to lab:     [x] Blue     [x] Lavender   [] on ice  [x] Green/yellow  [x] Green/black [] on ice  [] Yellow  [x] Red  [] Pink      [] COVID-19 swab    [] Rapid  [] PCR  [] Flu Swab  [] Strep Swab  [] Peds Viral Panel     [] Urine Sample  [] Pelvic Cultures  [] Blood Cultures   [] Wound Cultures        Edita Burkett RN  10/25/22 7244

## 2022-10-25 NOTE — ED PROVIDER NOTES
[address]  ED  EMERGENCY DEPARTMENT ENCOUNTER    Pt Name: Analy Chappell  MRN: [id number]  [address]  Date of evaluation: 10/25/2022      CHIEF COMPLAINT       Chief Complaint   Patient presents with    Eye Problem    Dysphagia         HISTORY OF [address] is a 63 y.o. male who presents concern for stroke. Symptoms began on Sunday with a mild headache noticed some drooping of his right face and then this morning had difficulty keeping a mouthwash in his mouth. Also noticed that his eye was drooping. No extremity symptoms with this no mental status changes. No history of stroke but multiple risk factors including obesity diabetes and hypertension. Duration will call emergent stroke consult        REVIEW OF SYSTEMS       Review of Systems   Constitutional:  Negative for fever. HENT:  Negative for congestion and sore throat. Eyes:  Negative for visual disturbance. Respiratory:  Negative for cough and shortness of breath. Cardiovascular:  Negative for chest pain. Gastrointestinal:  Negative for abdominal pain, diarrhea, nausea and vomiting. Genitourinary:  Negative for dysuria. Musculoskeletal:  Negative for back pain. Skin:  Negative for rash. Neurological:  Positive for speech difficulty, weakness and headaches. PAST MEDICAL HISTORY    has a past medical history of Abnormal EKG, Chest pain, COVID-19 vaccine administered, Diabetes mellitus ([address] History of cardiac cath, History of stress test, Hyperlipidemia, Hypertension, Morbid obesity ([address] and Sleep apnea. SURGICAL HISTORY      has a past surgical history that includes Carpal tunnel release (Right); Knee arthroscopy (Bilateral); [place] tooth extraction; UPPP; joint replacement (Bilateral); Cardiac surgery; Colonoscopy; Elbow surgery (Left, 4/25/16); and Colonoscopy (N/A, 11/11/2021).     CURRENT MEDICATIONS       Discharge Medication List as of 10/25/2022 12:50 PM CONTINUE these medications which have NOT CHANGED    Details   Compression Stockings MISC Disp-2 each, R-0, [id number] mm/hg      diclofenac (VOLTAREN) 75 MG EC tablet 1 tablet with food or milk Orally Twice a day for 30Historical Med      metFORMIN (GLUCOPHAGE) 500 MG tablet Take 500 mg by mouth 2 times daily (with meals) Historical Med      allopurinol (ZYLOPRIM) 100 MG tablet Take 1 tablet by mouth daily, Disp-30 tablet, R-1Normal      lisinopril (PRINIVIL;ZESTRIL) 40 MG tablet Take 40 mg by mouth dailyHistorical Med      metoprolol (TOPROL-XL) 50 MG XL tablet Take 50 mg by mouth daily      hydrochlorothiazide (HYDRODIURIL) 25 MG tablet Take 25 mg by mouth daily      aspirin 325 MG tablet Take 325 mg by mouth daily             ALLERGIES     has No Known Allergies. FAMILY HISTORY     He indicated that the status of his mother is unknown. He indicated that the status of his father is unknown.     family history includes Diabetes in his mother; Heart Disease in his mother; Hypertension in his father and mother. SOCIAL HISTORY      reports that he has never smoked. He has never used smokeless tobacco. He reports current alcohol use. He reports that he does not use drugs. PHYSICAL EXAM     INITIAL VITALS:  height is 5' 7\" (1.702 m) and weight is 300 lb (136.1 kg). His oral temperature is 98.7 °F (37.1 °C). His blood pressure is 174/106 (abnormal) and his pulse is 81. His respiration is 18 and oxygen saturation is 90%. Physical Exam  Constitutional:       Appearance: He is well-developed. HENT:      Head: Normocephalic and atraumatic. Eyes:      Conjunctiva/sclera: Conjunctivae normal.      Pupils: Pupils are equal, round, and reactive to light. Cardiovascular:      Rate and Rhythm: Normal rate and regular rhythm. Heart sounds: Normal heart sounds. Pulmonary:      Effort: Pulmonary effort is normal.      Breath sounds: Normal breath sounds.    Abdominal:      General: Bowel sounds are normal. There is no distension. Palpations: Abdomen is soft. Tenderness: There is no abdominal tenderness. There is no guarding or rebound. Musculoskeletal:      Cervical back: Normal range of motion. Lymphadenopathy:      Cervical: No cervical adenopathy. Skin:     General: Skin is warm and dry. Neurological:      Mental Status: He is alert and oriented to person, place, and time. Comments: There is right-sided facial droop including ptosis of the right upper lid with incomplete closing of the right eye. There is minimal but is perceptible movement of the right forehead. Otherwise 5 out of 5 strength upper and lower extremities all muscle groups bilaterally intact sensation except in the V1 and V2 distributions on the face. Psychiatric:         Behavior: Behavior normal.       DIFFERENTIAL DIAGNOSIS/ MDM:     Exam in the most part consistent with Bell's palsy however there is perceptible movement of the forehead given this and risk factors and age will call stroke consult. Will take to CT emergently. DIAGNOSTIC RESULTS     EKG: All EKG's are interpreted by the Emergency Department Physician who either signs or Co-signs this chart in the absenceof a cardiologist.    Sinus rhythm 83. Normal intervals. There is LVH. Normal axis. There is T wave inversion laterally in I and aVL seen on prior EKGs from 2019 and 2021.   No acute findings for patient    RADIOLOGY:   I directly visualized the following  images and reviewed theradiologist interpretations:   none      ED BEDSIDE ULTRASOUND:   none    LABS:  Labs Reviewed   HGB/HCT - Abnormal; Notable for the following components:       Result Value    POC Hemoglobin 17.6 (*)     All other components within normal limits   STROKE PANEL - Abnormal; Notable for the following components:    Glucose 105 (*)     Total  (*)     Myoglobin 172 (*)     All other components within normal limits   CBC WITH AUTO DIFFERENTIAL - Abnormal; Notable for the following components:    MCHC 35.5 (*)     Seg Neutrophils 67 (*)     Lymphocytes 20 (*)     Absolute Lymph # 1.07 (*)     All other components within normal limits   VENOUS BLOOD GAS, POINT OF CARE - Abnormal; Notable for the following components:    HCO3, Venous 29.8 (*)     All other components within normal limits   CREATININE W/GFR POINT OF CARE - Abnormal; Notable for the following components:    POC Creatinine 1.25 (*)     All other components within normal limits   LACTIC ACID,POINT OF CARE - Abnormal; Notable for the following components:    POC Lactic Acid 1.44 (*)     All other components within normal limits   POCT GLUCOSE - Abnormal; Notable for the following components:    POC Glucose 105 (*)     All other components within normal limits   ELECTROLYTES PLUS   CALCIUM, IONIC (POC)   SPECIMEN REJECTION   PROTIME-INR   APTT   PREVIOUS SPECIMEN   PREVIOUS SPECIMEN   UREA N (POC)           EMERGENCY DEPARTMENT COURSE:   Vitals:    Vitals:    10/25/22 1250 10/25/22 1306 10/25/22 1312 10/25/22 1315   BP:       Pulse: 80 81 80 81   Resp: 24 21 18 18   Temp:       TempSrc:       SpO2: 91% 91% 91% 90%   Weight:       Height:         -------------------------  BP: (!) 174/106, Temp: 98.7 °F (37.1 °C), Heart Rate: 81, Resp: 18    Stroke consult called stroke panel ordered. Patient taken to CT. CT brain and CT head and neck negative as above. No worsening in ED no other concerns. CRITICAL CARE:     CRITICAL CARE: There was a high probability of clinically significant/life threatening deterioration in this patient's condition which required my urgent intervention. Total critical care time was 30 minutes. This excludes any time for separately reportable procedures. CONSULTS:  IP CONSULT TO NEUROLOGY  IP CONSULT TO PRIMARY CARE PROVIDER  12:40 PM EDT  Face-to-face conversation with stroke interventionalists and neurology resident agree that this is incomplete Bell's.   Neurology did perform a bedside taste test to confirm diagnosis. They will write scripts for patient pickup for his Bell's, follow-up with PCP.    1:29 PM EDT  Spoke with patient's PCP appreciated courtesy call. Will make sure he follows with patient specifically for blood sugar control being placed on steroids. PROCEDURES:  None    FINAL IMPRESSION      1. Bell's palsy          DISPOSITION/PLAN       PATIENT REFERRED TO:  Daniel Reilly, DO  [address] [facility]  [phone]    Schedule an appointment as soon as possible for a visit in 3 days      Roderick Johnson MD  [place] [address]  [phone]    Schedule an appointment as soon as possible for a visit in 3 week(s)  ED follow up 2-4 weeks for Bell's Palsy      DISCHARGE MEDICATIONS:  Discharge Medication List as of 10/25/2022 12:50 PM        START taking these medications    Details   valACYclovir (VALTREX) 1 g tablet Take 1 tablet by mouth 3 times daily for 7 days, Disp-21 tablet, R-0Normal             (Please note that portions of this note were completed with a voice recognition program.  Efforts were made to edit the dictations butoccasionally words are mis-transcribed. )    Earnest Rodriguez MD  Attending Emergency Physician                   Earnest Rodriguez MD  10/25/22 1596

## 2022-10-26 ENCOUNTER — CARE COORDINATION (OUTPATIENT)
Dept: OTHER | Facility: CLINIC | Age: 62
End: 2022-10-26

## 2022-10-26 NOTE — CARE COORDINATION
Ambulatory Care Coordination Note  10/26/2022    ACC: Jasen Martin RN    ACM contacted patient for introduction to Associate Care Management related to ED visit for bell's palsy. Verified name and  with patient as identifiers. Patient declines care management at this time, stating no needs at this time. Patient states presented to Urgent Care for headache, right eye and lip drooping. Patient sent to ED from Urgent Care for concerns of stroke. Patient also presented to Dentist on 10/25/22 due to history of dental work, thought had a dental issue. Patient also reports history of ear infections. Patient continues to c/o fluttering of right eye and lip as well as drooping. Patient reports tearing from right eye. Patient reports difficulty eating and hydrating; however, patient is eating and hydrating with a lot of water. This ACM discussed risks of dehydration. Patient reports pain to right side of face as well as headache 6/10 \"like a toothache or earache\" with intermittent sharp pain above right temple. Discussed patient's bp 174/106 with patient. Patient encouraged to monitor bp at home and keep a log to take to PCP office. Patient denies any visual disturbances or one sided weakness at this time. Patient reports monitoring blood glucose levels while taking steroids per instructions from PCP. Patient obtained glucometer for $0 copay. Patient reports fbs today 149 mg/dl and patient will check prior to each meal.  Patient instructed by PCP office to report blood sugar greater than 400 mg/dl. Patient states PCP office outreached patient yesterday to notify of need to monitor blood glucose levels; however, PCP office did not schedule follow up appt. Patient to outreach PCP and Neurology to schedule follow ups; declined need for assistance by this ACM to schedule appts. Patient denies barriers to scheduling appts. Patient denies any financial needs at this time.   Patient continues to work at this time. Patient reports will be taking FMLA to help care for wife next week. Patient instructed to take Valtrex as directed until completely finished and not to stop unless directed by physician. Patient verbalized understanding. Patient reports he will take Tylenol or Aleve for headache. Patient states takes a lot of both of these meds. Patient educated to refrain from exceeding 4000 mg po in a 24 hour period and to take Aleve only as directed on the bottle. Patient verbalized understanding. ACM provided contact information for self referral if patient would need care management in the future. ACM will sign off at this time. Prior to Admission medications    Medication Sig Start Date End Date Taking? Authorizing Provider   valACYclovir (VALTREX) 1 g tablet Take 1 tablet by mouth 3 times daily for 7 days 10/25/22 11/1/22 Yes Bita Gómez MD   predniSONE (DELTASONE) 20 MG tablet Take 3 tablets by mouth daily for 7 days 10/25/22 11/1/22 Yes Bita Gómez MD   lisinopril (PRINIVIL;ZESTRIL) 40 MG tablet Take 40 mg by mouth daily   Yes Historical Provider, MD   metoprolol (TOPROL-XL) 50 MG XL tablet Take 50 mg by mouth daily   Yes Historical Provider, MD   hydrochlorothiazide (HYDRODIURIL) 25 MG tablet Take 25 mg by mouth daily   Yes Historical Provider, MD   aspirin 325 MG tablet Take 325 mg by mouth daily   Yes Historical Provider, MD   Compression Stockings MISC 15-20 mm/hg 3/23/22   Deshaun Britton DPM       No future appointments.

## 2022-10-29 LAB
EKG ATRIAL RATE: 83 BPM
EKG P AXIS: 10 DEGREES
EKG P-R INTERVAL: 142 MS
EKG Q-T INTERVAL: 356 MS
EKG QRS DURATION: 94 MS
EKG QTC CALCULATION (BAZETT): 418 MS
EKG R AXIS: -42 DEGREES
EKG T AXIS: 128 DEGREES
EKG VENTRICULAR RATE: 83 BPM

## 2022-11-14 ENCOUNTER — HOSPITAL ENCOUNTER (OUTPATIENT)
Age: 62
Discharge: HOME | End: 2022-11-14
Payer: COMMERCIAL

## 2022-11-14 ENCOUNTER — OFFICE VISIT (OUTPATIENT)
Dept: NEUROLOGY | Age: 62
End: 2022-11-14
Payer: COMMERCIAL

## 2022-11-14 VITALS
HEIGHT: 67 IN | WEIGHT: 300 LBS | HEART RATE: 79 BPM | BODY MASS INDEX: 47.09 KG/M2 | OXYGEN SATURATION: 95 % | SYSTOLIC BLOOD PRESSURE: 145 MMHG | DIASTOLIC BLOOD PRESSURE: 95 MMHG | TEMPERATURE: 97 F

## 2022-11-14 DIAGNOSIS — R51.9 RIGHT SIDED TEMPORAL HEADACHE: ICD-10-CM

## 2022-11-14 DIAGNOSIS — G51.0 RIGHT-SIDED BELL'S PALSY: Primary | ICD-10-CM

## 2022-11-14 DIAGNOSIS — G51.0 RIGHT-SIDED BELL'S PALSY: ICD-10-CM

## 2022-11-14 LAB
C-REACTIVE PROTEIN: 6.9 MG/L (ref 0–5)
SEDIMENTATION RATE, ERYTHROCYTE: 1 MM/HR (ref 0–20)

## 2022-11-14 PROCEDURE — G8484 FLU IMMUNIZE NO ADMIN: HCPCS | Performed by: FAMILY MEDICINE

## 2022-11-14 PROCEDURE — 99212 OFFICE O/P EST SF 10 MIN: CPT | Performed by: FAMILY MEDICINE

## 2022-11-14 PROCEDURE — G8417 CALC BMI ABV UP PARAM F/U: HCPCS | Performed by: FAMILY MEDICINE

## 2022-11-14 PROCEDURE — 86140 C-REACTIVE PROTEIN: CPT

## 2022-11-14 PROCEDURE — G8427 DOCREV CUR MEDS BY ELIG CLIN: HCPCS | Performed by: FAMILY MEDICINE

## 2022-11-14 PROCEDURE — 1036F TOBACCO NON-USER: CPT | Performed by: FAMILY MEDICINE

## 2022-11-14 PROCEDURE — 36415 COLL VENOUS BLD VENIPUNCTURE: CPT

## 2022-11-14 PROCEDURE — 3017F COLORECTAL CA SCREEN DOC REV: CPT | Performed by: FAMILY MEDICINE

## 2022-11-14 PROCEDURE — 86618 LYME DISEASE ANTIBODY: CPT

## 2022-11-14 PROCEDURE — 85652 RBC SED RATE AUTOMATED: CPT

## 2022-11-14 RX ORDER — DIAZEPAM 5 MG/1
5 TABLET ORAL
Qty: 1 TABLET | Refills: 0 | Status: SHIPPED | OUTPATIENT
Start: 2022-11-14 | End: 2022-11-14

## 2022-11-14 RX ORDER — GABAPENTIN 100 MG/1
100 CAPSULE ORAL 3 TIMES DAILY
Qty: 90 CAPSULE | Refills: 0 | Status: SHIPPED | OUTPATIENT
Start: 2022-11-14 | End: 2022-12-14

## 2022-11-14 NOTE — PROGRESS NOTES
[address] [place] [address]  [facility] # [address] [id number]  Dept: [phone]  Dept Fax: [phone]    NEUROLOGY NEW PATIENT NOTE       PATIENT NAME: Tony Stratton  PATIENT MRN: [id number]  PRIMARY CARE PHYSICIAN: Therese Rodriguez DO      HPI:      I have the pleasure to evaluate Tony Stratton who is a 63 y.o. male patient with PMH of HTN, DM, Gout, Morbid obesity who came for evaluation of his right sided bells palsy    In recap of my consult note 10/25/22:  He has a hx of neck spasms and felt like his throat \"locked up\". Denies any dysphagia but felt the symptom for approximately 10 seconds. He later felt his right eye watering and feeling red. Next day he felt some rt sided facial pain. He has been using visine drops for his eyes without significant improvement. Denies any allergies or hx of allergic rhinitis. He works here at [employer] in [employer] and got off work around [address] [place] [address] and did not feel any change in symptoms so decided to come in. Patient did complete his antivirals and steroid dosing. Reports some improvement in his symptoms. Did visit with an eye doctor who gave drops and also referred to acupuncture. Completed 1 session so far. Patient also mentions a right sided temporal headache.   Onset the same time as when Kwok's started  Throbbing in nature  Has not tried anything for pain other than topical balms    PREVIOUS WORKUP:     Lab Results   Component Value Date    WBC 5.5 10/25/2022    HGB 14.5 10/25/2022    HCT 40.9 10/25/2022    MCV 92.7 10/25/2022     10/25/2022       Past Medical History:   Diagnosis Date    Abnormal EKG     BY HX    Chest pain     COVID-19 vaccine administered 1-,12-    [employer] and has received Booster of Pfizer     Diabetes mellitus ([facility] [address]     History of cardiac cath 03/15/2019    History of stress test 02/22/2019    Hyperlipidemia     Hypertension     Morbid obesity ([facility] [address]     Sleep apnea     HAD SURGERY        Past Surgical History:   Procedure Laterality Date    CARDIAC SURGERY      CARDIAC CATH  GREATER THAN 5 YRS AGO    CARPAL TUNNEL RELEASE Right     COLONOSCOPY      COLONOSCOPY N/A 11/11/2021    COLONOSCOPY POLYPECTOMY COLD BIOPSY performed by Hernandez Ritter MD at [address] Left 4/25/16    lt lateral epicondylectomy    JOINT REPLACEMENT Bilateral     KNEES    KNEE ARTHROSCOPY Bilateral     2-3x each    UPPP UVULOPALATOPHARYGOPLASTY      WISDOM TOOTH EXTRACTION          Social History     Socioeconomic History    Marital status:      Spouse name: Not on file    Number of children: Not on file    Years of education: Not on file    Highest education level: Not on file   Occupational History    Not on file   Tobacco Use    Smoking status: Never    Smokeless tobacco: Never   Vaping Use    Vaping Use: Never used   Substance and Sexual Activity    Alcohol use: Yes     Comment: occasionally    Drug use: No    Sexual activity: Not on file   Other Topics Concern    Not on file   Social History Narrative    Not on file     Social Determinants of Health     Financial Resource Strain: Not on file   Food Insecurity: Not on file   Transportation Needs: Not on file   Physical Activity: Not on file   Stress: Not on file   Social Connections: Not on file   Intimate Partner Violence: Not on file   Housing Stability: Not on file        Current Outpatient Medications   Medication Sig Dispense Refill    lisinopril (PRINIVIL;ZESTRIL) 40 MG tablet Take 40 mg by mouth daily      metoprolol (TOPROL-XL) 50 MG XL tablet Take 50 mg by mouth daily      hydrochlorothiazide (HYDRODIURIL) 25 MG tablet Take 25 mg by mouth daily      aspirin 325 MG tablet Take 325 mg by mouth daily      Compression Stockings MISC 15-20 mm/hg (Patient not taking: Reported on 11/14/2022) 2 each 0     No current facility-administered medications for this visit.         No Known Allergies     REVIEW OF SYSTEMS:     Constitutional  Negative for fever and chills    HEENT  Negative for ear discharge, ear pain, nosebleed    Eyes  Negative for photophobia, pain and discharge    Respiratory  Negative for hemoptysis and sputum    Cardiovascular  Negative for orthopnea, claudication and PND    Gastrointestinal  Negative for abdominal pain, diarrhea, blood in stool    Musculoskeletal  Negative for joint pain, negative for myalgia    Skin  Negative for rash or itching    Neurological Negative for seizures, weakness, headache, dysarthria, aphasia   Endo/heme/allergies  Negative for polydipsia, environmental allergy    Psychiatric/behavioral  Negative for suicidal ideation. Patient is not anxious        VITALS  BP (!) 146/94   Pulse 80   Temp 97 °F (36.1 °C) (Temporal)   Ht 5' 7\" (1.702 m)   Wt 300 lb (136.1 kg)   SpO2 95%   BMI 46.99 kg/m²      PHYSICAL EXAMINATION:     Physical Exam   General appearance: cooperative  Skin: no rash or skin lesions. HEENT: normocephalic  Optic Fundi: deferred  Neck: supple, no cervcical adenopathy or carotid bruit  Lungs: clear to auscultation  Heart: Regular rate and rhythm, normal S1, S2. No murmurs, clicks or gallops.   Peripheral pulses: radial pulses palpable  Abdominal: BS present, soft, NT, ND  Extremities: no edema    NEUROLOGICAL EXAMINATION:     GENERAL  Appears comfortable and in no distress   HEENT  NC/ AT   HEART  S1 and S2 heard; palpation of pulses: radial pulse    NECK  Supple and no bruits heard   MENTAL STATUS:  Alert, oriented, intact memory, no confusion, normal speech, normal language, no hallucination or delusion   CRANIAL NERVES: II     -      Visual fields intact to confrontation  III,IV,VI -  PERR, EOMs full, no ptosis  V     -     Normal facial sensation   VII    -     abnormal facial symmetry (mild right droop)  VIII   -     Intact hearing   IX,X -     Symmetrical palate  XI    -     Symmetrical shoulder shrug  XII   -     Midline tongue, no atrophy    MOTOR FUNCTION: RUE: Significant for good strength of grade 5/5 in proximal and distal muscle groups   LUE: Significant for good strength of grade 5/5 in proximal and distal muscle groups   RLE: Significant for good strength of grade 5/5 in proximal and distal muscle groups   LLE: Significant for good strength of grade 5/5 in proximal and distal muscle groups      Normal bulk, normal tone and no involuntary movements, no tremor   SENSORY FUNCTION:  Normal touch, normal pin, normal vibration, normal proprioception   CEREBELLAR FUNCTION:  Intact fine motor control over upper limbs and lower limbs   REFLEX FUNCTION:  Symmetric in upper and lower extremities, no Babinski sign   STATION and GAIT  Normal gait and tandem station, normal tip toes and heel walking     IMAGING:     CT Head WO Contrast 10/25/2022  Impression   No acute intracranial abnormality. These results were sent to the Results [address] ([employer]) on   10/25/2022 at 11:55 am to be communicated to the referring/covering health   care provider/office. CTA Head Neck W Contrast 10/25/2022  Impression   1. No flow limiting stenosis of the cervical carotid/vertebral arteries. 2. No significant stenosis of the [id number]. These results were sent to the Results [address] ([employer]) on   10/25/2022 at 12:04 pm to be communicated to the referring/covering health   care provider/office. ASSESSMENT:      Case of a 63 y.o. male who presents for hospital follow up of his Rt sided bell's    //Right-sided Bell's palsy  //Right sided temporal headache  //HTN  //DM  //Gout  //Morbid obesity    PLAN:     Completed antiviral and steroid dosing  Eye care discussed  Will obtain ESR, CRP  MRI Brain w/ w/o  Gabapentin trial for pain relief, 100mg TID  Continue follow up as scheduled    Mr. Hopson received counseling on the following healthy behaviors: medical compliance, smoking cessation, blood pressure control, regular follow up with primary doctor.         Electronically signed by Mina Isaac MD on 11/14/2022 at 1:30 PM       Mina Isaac MD  Neurology PGY-4  11/14/22

## 2022-11-14 NOTE — PATIENT INSTRUCTIONS
Schedule a Vaccine  When you qualify to receive the vaccine, call the [facility]) COVID-19 Vaccination Hotline to schedule your appointment or to get additional information about the [facility]) locations which are offering the COVID-19 vaccine. To be 94% effective, it's important that you receive two doses of one of the COVID-19 vaccines. -If you are receiving the [place] vaccine, your second shot will be scheduled as close to 21 days after the first shot as possible. -If you are receiving the Moderna vaccine, your second shot will be scheduled as close to 28 days after the first shot as possible. [facility]) COVID-19 Vaccination Hotline: [phone]    Links to [facility]) website and [facility] website:    [employer]/[email or web address]    [email or web address]

## 2022-11-15 LAB — LYME ANTIBODY: 0.3

## 2022-11-18 ENCOUNTER — HOSPITAL ENCOUNTER (OUTPATIENT)
Age: 62
Setting detail: SPECIMEN
Discharge: HOME | End: 2022-11-18

## 2022-11-18 ENCOUNTER — HOSPITAL ENCOUNTER (OUTPATIENT)
Dept: MRI IMAGING | Facility: CLINIC | Age: 62
Discharge: HOME | End: 2022-11-20
Payer: COMMERCIAL

## 2022-11-18 DIAGNOSIS — R51.9 RIGHT SIDED TEMPORAL HEADACHE: ICD-10-CM

## 2022-11-18 DIAGNOSIS — G51.0 RIGHT-SIDED BELL'S PALSY: ICD-10-CM

## 2022-11-18 LAB — POC CREATININE: 1 MG/DL (ref 0.6–1.4)

## 2022-11-18 PROCEDURE — A9579 GAD-BASE MR CONTRAST NOS,1ML: HCPCS | Performed by: FAMILY MEDICINE

## 2022-11-18 PROCEDURE — 70553 MRI BRAIN STEM W/O & W/DYE: CPT

## 2022-11-18 PROCEDURE — 2580000003 HC RX 258: Performed by: FAMILY MEDICINE

## 2022-11-18 PROCEDURE — 6360000004 HC RX CONTRAST MEDICATION: Performed by: FAMILY MEDICINE

## 2022-11-18 RX ORDER — SODIUM CHLORIDE 0.9 % (FLUSH) 0.9 %
10 SYRINGE (ML) INJECTION PRN
Status: COMPLETED | OUTPATIENT
Start: 2022-11-18 | End: 2022-11-18

## 2022-11-18 RX ADMIN — SODIUM CHLORIDE, PRESERVATIVE FREE 10 ML: 5 INJECTION INTRAVENOUS at 09:35

## 2022-11-18 RX ADMIN — GADOTERIDOL 20 ML: 279.3 INJECTION, SOLUTION INTRAVENOUS at 09:35

## 2023-06-23 ENCOUNTER — OFFICE VISIT (OUTPATIENT)
Dept: PODIATRY | Age: 63
End: 2023-06-23
Payer: COMMERCIAL

## 2023-06-23 VITALS — WEIGHT: 300 LBS | BODY MASS INDEX: 47.09 KG/M2 | HEIGHT: 67 IN

## 2023-06-23 DIAGNOSIS — M76.61 ACHILLES TENDINITIS OF RIGHT LOWER EXTREMITY: ICD-10-CM

## 2023-06-23 DIAGNOSIS — R60.0 EDEMA OF LOWER EXTREMITY: Primary | ICD-10-CM

## 2023-06-23 PROCEDURE — G8427 DOCREV CUR MEDS BY ELIG CLIN: HCPCS | Performed by: PODIATRIST

## 2023-06-23 PROCEDURE — 1036F TOBACCO NON-USER: CPT | Performed by: PODIATRIST

## 2023-06-23 PROCEDURE — 99213 OFFICE O/P EST LOW 20 MIN: CPT | Performed by: PODIATRIST

## 2023-06-23 PROCEDURE — 3017F COLORECTAL CA SCREEN DOC REV: CPT | Performed by: PODIATRIST

## 2023-06-23 PROCEDURE — G8417 CALC BMI ABV UP PARAM F/U: HCPCS | Performed by: PODIATRIST

## 2023-06-23 RX ORDER — METHYLPREDNISOLONE 4 MG/1
TABLET ORAL
Qty: 1 KIT | Refills: 0 | Status: SHIPPED | OUTPATIENT
Start: 2023-06-23 | End: 2023-06-29

## 2023-06-23 RX ORDER — LISINOPRIL 2.5 MG/1
TABLET ORAL
COMMUNITY
Start: 2023-04-10

## 2023-07-12 ENCOUNTER — OFFICE VISIT (OUTPATIENT)
Dept: ORTHOPEDIC SURGERY | Age: 63
End: 2023-07-12
Payer: COMMERCIAL

## 2023-07-12 DIAGNOSIS — M25.552 LEFT HIP PAIN: Primary | ICD-10-CM

## 2023-07-12 PROCEDURE — 1036F TOBACCO NON-USER: CPT | Performed by: ORTHOPAEDIC SURGERY

## 2023-07-12 PROCEDURE — G8417 CALC BMI ABV UP PARAM F/U: HCPCS | Performed by: ORTHOPAEDIC SURGERY

## 2023-07-12 PROCEDURE — G8428 CUR MEDS NOT DOCUMENT: HCPCS | Performed by: ORTHOPAEDIC SURGERY

## 2023-07-12 PROCEDURE — 3017F COLORECTAL CA SCREEN DOC REV: CPT | Performed by: ORTHOPAEDIC SURGERY

## 2023-07-12 PROCEDURE — 99213 OFFICE O/P EST LOW 20 MIN: CPT | Performed by: ORTHOPAEDIC SURGERY

## 2023-07-12 NOTE — PROGRESS NOTES
Trevor Pillai M.D.            [address] [facility], [address] [facility], [address] [place] [address] [address]           Dept Phone: [phone]           Dept Fax:  [address]          Dept Phone: [phone]           Dept Fax:  [phone]      Chief Compliant:  Chief Complaint   Patient presents with    Pain     LT HIP        History of Present Illness: This is a 63 y.o. male who presents to the clinic today for evaluation / follow up of left hip pain. 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 He has been pretty much getting in and out as he has family issues and has a child with Down syndrome his wife was going through surgeries as well. He is at a point where his hip pain has become very debilitating for him difficulty getting around cannot tie his shoes has to wear slip on's next and difficulty even to get around. Review of Systems   Constitutional: Negative for fever, chills, sweats. Eyes: Negative for changes in vision, or pain. HENT: Negative for ear ache, epistaxis, or sore throat. Respiratory/Cardio: Negative for Chest pain, palpitations, SOB, or cough. Gastrointestinal: Negative for abdominal pain, N/V/D. Genitourinary: Negative for dysuria, frequency, urgency, or hematuria. Neurological: Negative for headache, numbness, or weakness. Integumentary: Negative for rash, itching, laceration, or abrasion. Musculoskeletal: Positive for Pain (LT HIP)       Physical Exam:  Constitutional: Patient is oriented to person, place, and time. Patient appears well-developed and well nourished.    HENT: Negative otherwise noted  Head:

## 2023-07-17 ENCOUNTER — HOSPITAL ENCOUNTER (OUTPATIENT)
Dept: INTERVENTIONAL RADIOLOGY/VASCULAR | Age: 63
Discharge: HOME | End: 2023-07-19
Payer: COMMERCIAL

## 2023-07-17 DIAGNOSIS — M25.552 LEFT HIP PAIN: ICD-10-CM

## 2023-07-17 PROCEDURE — 2709999900 IR ARTHR/ASP/INJ MAJOR JT/BURSA LEFT WO US

## 2023-07-17 PROCEDURE — 20610 DRAIN/INJ JOINT/BURSA W/O US: CPT

## 2023-07-17 PROCEDURE — 6360000004 HC RX CONTRAST MEDICATION: Performed by: RADIOLOGY

## 2023-07-17 PROCEDURE — 2500000003 HC RX 250 WO HCPCS: Performed by: ORTHOPAEDIC SURGERY

## 2023-07-17 PROCEDURE — 77002 NEEDLE LOCALIZATION BY XRAY: CPT

## 2023-07-17 PROCEDURE — 6360000002 HC RX W HCPCS: Performed by: ORTHOPAEDIC SURGERY

## 2023-07-17 RX ORDER — METHYLPREDNISOLONE ACETATE 80 MG/ML
80 INJECTION, SUSPENSION INTRA-ARTICULAR; INTRALESIONAL; INTRAMUSCULAR; SOFT TISSUE ONCE
Status: COMPLETED | OUTPATIENT
Start: 2023-07-17 | End: 2023-07-17

## 2023-07-17 RX ORDER — BUPIVACAINE HYDROCHLORIDE 5 MG/ML
4 INJECTION, SOLUTION EPIDURAL; INTRACAUDAL ONCE
Status: COMPLETED | OUTPATIENT
Start: 2023-07-17 | End: 2023-07-17

## 2023-07-17 RX ORDER — LIDOCAINE HYDROCHLORIDE 10 MG/ML
4 INJECTION, SOLUTION EPIDURAL; INFILTRATION; INTRACAUDAL; PERINEURAL ONCE
Status: COMPLETED | OUTPATIENT
Start: 2023-07-17 | End: 2023-07-17

## 2023-07-17 RX ADMIN — LIDOCAINE HYDROCHLORIDE 4 ML: 10 INJECTION, SOLUTION EPIDURAL; INFILTRATION; INTRACAUDAL; PERINEURAL at 08:25

## 2023-07-17 RX ADMIN — METHYLPREDNISOLONE ACETATE 80 MG: 80 INJECTION, SUSPENSION INTRA-ARTICULAR; INTRALESIONAL; INTRAMUSCULAR; SOFT TISSUE at 08:25

## 2023-07-17 RX ADMIN — BUPIVACAINE HYDROCHLORIDE 20 MG: 5 INJECTION, SOLUTION EPIDURAL; INTRACAUDAL; PERINEURAL at 08:25

## 2023-07-17 RX ADMIN — IOVERSOL 5 ML: 741 INJECTION INTRA-ARTERIAL; INTRAVENOUS at 08:26

## 2023-07-17 NOTE — PROGRESS NOTES
Patient tolerated left hip injection without distress. Bandaid to site. Discharge instructions given, no questions at this time. Patient discharged via private auto.

## 2023-07-19 ENCOUNTER — OFFICE VISIT (OUTPATIENT)
Dept: PODIATRY | Age: 63
End: 2023-07-19
Payer: COMMERCIAL

## 2023-07-19 VITALS — WEIGHT: 300 LBS | HEIGHT: 67 IN | BODY MASS INDEX: 47.09 KG/M2

## 2023-07-19 DIAGNOSIS — R60.0 EDEMA OF LOWER EXTREMITY: ICD-10-CM

## 2023-07-19 DIAGNOSIS — M79.604 PAIN OF RIGHT LOWER EXTREMITY: ICD-10-CM

## 2023-07-19 DIAGNOSIS — M72.2 PLANTAR FASCIITIS, RIGHT: Primary | ICD-10-CM

## 2023-07-19 PROCEDURE — G8417 CALC BMI ABV UP PARAM F/U: HCPCS | Performed by: PODIATRIST

## 2023-07-19 PROCEDURE — 99213 OFFICE O/P EST LOW 20 MIN: CPT | Performed by: PODIATRIST

## 2023-07-19 PROCEDURE — 3017F COLORECTAL CA SCREEN DOC REV: CPT | Performed by: PODIATRIST

## 2023-07-19 PROCEDURE — G8427 DOCREV CUR MEDS BY ELIG CLIN: HCPCS | Performed by: PODIATRIST

## 2023-07-19 PROCEDURE — 1036F TOBACCO NON-USER: CPT | Performed by: PODIATRIST

## 2023-07-19 RX ORDER — PREDNISONE 10 MG/1
TABLET ORAL
Qty: 20 TABLET | Refills: 0 | Status: SHIPPED | OUTPATIENT
Start: 2023-07-19

## 2023-07-19 RX ORDER — ASPIRIN 325 MG
TABLET, DELAYED RELEASE (ENTERIC COATED) ORAL
COMMUNITY
Start: 2023-07-03 | End: 2023-07-19

## 2023-07-19 NOTE — PROGRESS NOTES
[address] [facility],[address] PODIATRY [address] [place] [address]  Dept: [phone]  Dept Fax: [phone]    RETURN PATIENT PROGRESS NOTE  Date of patient's visit: 7/19/2023  Patient's Name:  Vignesh Lee YOB: 1960            Patient Care Team:  Laurie Patel DO as PCP - General  Laurie Patel DO as PCP - Empaneled Provider  Maggie Kinsey DPM as Physician (Podiatry)  Flako Mckenzie MD as Consulting Physician (Gastroenterology)       [facility] 63 y.o. male that presents for follow-up of   Chief Complaint   Patient presents with    Ankle Pain     Right ankle pain and swelling    Foot Pain     Right heel pain     Pt's primary care physician is Laurie Patel DO last seen 01/25/2023  Symptoms began 2 month(s) ago and are decreased . Patient relates pain is Present to heel, right foot. Pain is rated 7 out of 10 and is described as constant, moderate, severe. Treatments prior to today's visit include: cortisone injection. Currently denies F/C/N/V. No Known Allergies    Past Medical History:   Diagnosis Date    Abnormal EKG     BY HX    Chest pain     COVID-19 vaccine administered 1-,12-    Pfizer and has received Booster of Pfizer     Diabetes mellitus ([address])     History of cardiac cath 03/15/2019    History of stress test 02/22/2019    Hyperlipidemia     Hypertension     Morbid obesity ([address])     Sleep apnea     HAD SURGERY       Prior to Admission medications    Medication Sig Start Date End Date Taking?  Authorizing Provider   lisinopril (PRINIVIL;ZESTRIL) 2.5 MG tablet  4/10/23  Yes Historical Provider, MD   metoprolol (TOPROL-XL) 50 MG XL tablet Take 1 tablet by mouth daily   Yes Historical Provider, MD   hydrochlorothiazide (HYDRODIURIL) 25 MG tablet Take 1 tablet by mouth daily   Yes Historical Provider, MD   aspirin 325 MG tablet Take 1 tablet by mouth daily   Yes Historical

## 2023-08-03 ENCOUNTER — HOSPITAL ENCOUNTER (OUTPATIENT)
Age: 63
Discharge: HOME | End: 2023-08-03
Payer: COMMERCIAL

## 2023-08-03 LAB
25(OH)D3 SERPL-MCNC: 24.5 NG/ML
ALBUMIN SERPL-MCNC: 4.5 G/DL (ref 3.5–5.2)
ALBUMIN/GLOB SERPL: 1.6 {RATIO} (ref 1–2.5)
ALP SERPL-CCNC: 71 U/L (ref 40–129)
ALT SERPL-CCNC: 24 U/L (ref 5–41)
ANION GAP SERPL CALCULATED.3IONS-SCNC: 13 MMOL/L (ref 9–17)
AST SERPL-CCNC: 29 U/L
BACTERIA URNS QL MICRO: ABNORMAL
BASOPHILS # BLD: 0.03 K/UL (ref 0–0.2)
BASOPHILS NFR BLD: 1 % (ref 0–2)
BILIRUB SERPL-MCNC: 1.2 MG/DL (ref 0.3–1.2)
BILIRUB UR QL STRIP: NEGATIVE
BUN SERPL-MCNC: 11 MG/DL (ref 8–23)
CALCIUM SERPL-MCNC: 9.7 MG/DL (ref 8.6–10.4)
CASTS #/AREA URNS LPF: ABNORMAL /LPF (ref 0–8)
CHLORIDE SERPL-SCNC: 102 MMOL/L (ref 98–107)
CHOLEST SERPL-MCNC: 156 MG/DL
CHOLESTEROL/HDL RATIO: 4.5
CK SERPL-CCNC: 469 U/L (ref 39–308)
CLARITY UR: ABNORMAL
CO2 SERPL-SCNC: 26 MMOL/L (ref 20–31)
COLOR UR: YELLOW
CREAT SERPL-MCNC: 0.9 MG/DL (ref 0.7–1.2)
CREAT UR-MCNC: 213.4 MG/DL (ref 39–259)
EKG ATRIAL RATE: 95 BPM
EKG P AXIS: 15 DEGREES
EKG P-R INTERVAL: 118 MS
EKG Q-T INTERVAL: 360 MS
EKG QRS DURATION: 90 MS
EKG QTC CALCULATION (BAZETT): 452 MS
EKG R AXIS: -38 DEGREES
EKG T AXIS: 107 DEGREES
EKG VENTRICULAR RATE: 95 BPM
EOSINOPHIL # BLD: 0.1 K/UL (ref 0–0.44)
EOSINOPHILS RELATIVE PERCENT: 2 % (ref 1–4)
EPI CELLS #/AREA URNS HPF: ABNORMAL /HPF (ref 0–5)
ERYTHROCYTE [DISTWIDTH] IN BLOOD BY AUTOMATED COUNT: 13.9 % (ref 11.8–14.4)
EST. AVERAGE GLUCOSE BLD GHB EST-MCNC: 108 MG/DL
GFR SERPL CREATININE-BSD FRML MDRD: >60 ML/MIN/1.73M2
GLUCOSE SERPL-MCNC: 110 MG/DL (ref 70–99)
GLUCOSE UR STRIP-MCNC: NEGATIVE MG/DL
HBA1C MFR BLD: 5.4 % (ref 4–6)
HCT VFR BLD AUTO: 47.9 % (ref 40.7–50.3)
HDLC SERPL-MCNC: 35 MG/DL
HGB BLD-MCNC: 16.3 G/DL (ref 13–17)
HGB UR QL STRIP.AUTO: ABNORMAL
IMM GRANULOCYTES # BLD AUTO: <0.03 K/UL (ref 0–0.3)
IMM GRANULOCYTES NFR BLD: 0 %
KETONES UR STRIP-MCNC: NEGATIVE MG/DL
LDLC SERPL CALC-MCNC: 76 MG/DL (ref 0–130)
LEUKOCYTE ESTERASE UR QL STRIP: ABNORMAL
LYMPHOCYTES NFR BLD: 1.3 K/UL (ref 1.1–3.7)
LYMPHOCYTES RELATIVE PERCENT: 20 % (ref 24–43)
MAGNESIUM SERPL-MCNC: 2 MG/DL (ref 1.6–2.6)
MCH RBC QN AUTO: 32.5 PG (ref 25.2–33.5)
MCHC RBC AUTO-ENTMCNC: 34 G/DL (ref 28.4–34.8)
MCV RBC AUTO: 95.4 FL (ref 82.6–102.9)
MICROALBUMIN UR-MCNC: 136 MG/L
MICROALBUMIN/CREAT UR-RTO: 64 MCG/MG CREAT
MONOCYTES NFR BLD: 0.59 K/UL (ref 0.1–1.2)
MONOCYTES NFR BLD: 9 % (ref 3–12)
MYOGLOBIN SERPL-MCNC: 139 NG/ML (ref 28–72)
NEUTROPHILS NFR BLD: 68 % (ref 36–65)
NEUTS SEG NFR BLD: 4.51 K/UL (ref 1.5–8.1)
NITRITE UR QL STRIP: NEGATIVE
NRBC BLD-RTO: 0 PER 100 WBC
PH UR STRIP: 6.5 [PH] (ref 5–8)
PLATELET # BLD AUTO: 169 K/UL (ref 138–453)
PMV BLD AUTO: 9.6 FL (ref 8.1–13.5)
POTASSIUM SERPL-SCNC: 4.3 MMOL/L (ref 3.7–5.3)
PROT SERPL-MCNC: 7.3 G/DL (ref 6.4–8.3)
PROT UR STRIP-MCNC: ABNORMAL MG/DL
PSA SERPL-MCNC: 0.37 NG/ML
RBC # BLD AUTO: 5.02 M/UL (ref 4.21–5.77)
RBC #/AREA URNS HPF: ABNORMAL /HPF (ref 0–4)
SODIUM SERPL-SCNC: 141 MMOL/L (ref 135–144)
SP GR UR STRIP: 1.02 (ref 1–1.03)
T3FREE SERPL-MCNC: 3.64 PG/ML (ref 2.02–4.43)
T4 FREE SERPL-MCNC: 1.1 NG/DL (ref 0.9–1.7)
TRIGL SERPL-MCNC: 224 MG/DL
TSH SERPL DL<=0.05 MIU/L-ACNC: 3.2 UIU/ML (ref 0.3–5)
UROBILINOGEN UR STRIP-ACNC: NORMAL EU/DL (ref 0–1)
WBC #/AREA URNS HPF: ABNORMAL /HPF (ref 0–5)
WBC OTHER # BLD: 6.6 K/UL (ref 3.5–11.3)

## 2023-08-03 PROCEDURE — 82570 ASSAY OF URINE CREATININE: CPT

## 2023-08-03 PROCEDURE — 84439 ASSAY OF FREE THYROXINE: CPT

## 2023-08-03 PROCEDURE — 85025 COMPLETE CBC W/AUTO DIFF WBC: CPT

## 2023-08-03 PROCEDURE — 87186 SC STD MICRODIL/AGAR DIL: CPT

## 2023-08-03 PROCEDURE — 80061 LIPID PANEL: CPT

## 2023-08-03 PROCEDURE — 93005 ELECTROCARDIOGRAM TRACING: CPT | Performed by: FAMILY MEDICINE

## 2023-08-03 PROCEDURE — 84443 ASSAY THYROID STIM HORMONE: CPT

## 2023-08-03 PROCEDURE — 36415 COLL VENOUS BLD VENIPUNCTURE: CPT

## 2023-08-03 PROCEDURE — 83735 ASSAY OF MAGNESIUM: CPT

## 2023-08-03 PROCEDURE — 81001 URINALYSIS AUTO W/SCOPE: CPT

## 2023-08-03 PROCEDURE — 80053 COMPREHEN METABOLIC PANEL: CPT

## 2023-08-03 PROCEDURE — 83036 HEMOGLOBIN GLYCOSYLATED A1C: CPT

## 2023-08-03 PROCEDURE — 82043 UR ALBUMIN QUANTITATIVE: CPT

## 2023-08-03 PROCEDURE — 84481 FREE ASSAY (FT-3): CPT

## 2023-08-03 PROCEDURE — 83874 ASSAY OF MYOGLOBIN: CPT

## 2023-08-03 PROCEDURE — 82306 VITAMIN D 25 HYDROXY: CPT

## 2023-08-03 PROCEDURE — 87088 URINE BACTERIA CULTURE: CPT

## 2023-08-03 PROCEDURE — G0103 PSA SCREENING: HCPCS

## 2023-08-03 PROCEDURE — 87086 URINE CULTURE/COLONY COUNT: CPT

## 2023-08-03 PROCEDURE — 82550 ASSAY OF CK (CPK): CPT

## 2023-08-04 LAB
MICROORGANISM SPEC CULT: ABNORMAL
MICROORGANISM SPEC CULT: ABNORMAL
SPECIMEN DESCRIPTION: ABNORMAL

## 2023-08-14 RX ORDER — PREDNISONE 10 MG/1
TABLET ORAL
Qty: 20 TABLET | Refills: 0 | Status: SHIPPED | OUTPATIENT
Start: 2023-08-14

## 2023-09-21 ENCOUNTER — HOSPITAL ENCOUNTER (OUTPATIENT)
Dept: NUCLEAR MEDICINE | Age: 63
Discharge: HOME | End: 2023-09-23
Attending: FAMILY MEDICINE
Payer: COMMERCIAL

## 2023-09-21 ENCOUNTER — HOSPITAL ENCOUNTER (OUTPATIENT)
Age: 63
Discharge: HOME | End: 2023-09-23
Attending: FAMILY MEDICINE
Payer: COMMERCIAL

## 2023-09-21 VITALS
HEIGHT: 67 IN | SYSTOLIC BLOOD PRESSURE: 141 MMHG | HEART RATE: 87 BPM | WEIGHT: 300 LBS | BODY MASS INDEX: 47.09 KG/M2 | DIASTOLIC BLOOD PRESSURE: 100 MMHG

## 2023-09-21 DIAGNOSIS — R07.9 CHEST PAIN: ICD-10-CM

## 2023-09-21 DIAGNOSIS — R94.31 ABNORMAL ELECTROCARDIOGRAM: ICD-10-CM

## 2023-09-21 DIAGNOSIS — R07.9 CHEST PAIN: Primary | ICD-10-CM

## 2023-09-21 LAB
ECHO AO ROOT DIAM: 3.4 CM
ECHO AO ROOT INDEX: 1.42 CM/M2
ECHO AV AREA PEAK VELOCITY: 1.9 CM2
ECHO AV AREA VTI: 2.3 CM2
ECHO AV AREA/BSA PEAK VELOCITY: 0.8 CM2/M2
ECHO AV AREA/BSA VTI: 1 CM2/M2
ECHO AV MEAN GRADIENT: 7 MMHG
ECHO AV MEAN VELOCITY: 1.2 M/S
ECHO AV PEAK GRADIENT: 14 MMHG
ECHO AV PEAK VELOCITY: 1.9 M/S
ECHO AV VELOCITY RATIO: 0.58
ECHO AV VTI: 34.8 CM
ECHO BSA: 2.54 M2
ECHO EST RA PRESSURE: 3 MMHG
ECHO LA AREA 2C: 27.1 CM2
ECHO LA AREA 4C: 21.3 CM2
ECHO LA DIAMETER INDEX: 1.42 CM/M2
ECHO LA DIAMETER: 3.4 CM
ECHO LA MAJOR AXIS: 5.9 CM
ECHO LA MINOR AXIS: 6.5 CM
ECHO LA TO AORTIC ROOT RATIO: 1
ECHO LA VOL 2C: 88 ML (ref 18–58)
ECHO LA VOL 4C: 64 ML (ref 18–58)
ECHO LA VOL BP: 79 ML (ref 18–58)
ECHO LA VOL/BSA BIPLANE: 33 ML/M2 (ref 16–34)
ECHO LA VOLUME INDEX A2C: 37 ML/M2 (ref 16–34)
ECHO LA VOLUME INDEX A4C: 27 ML/M2 (ref 16–34)
ECHO LV E' LATERAL VELOCITY: 7 CM/S
ECHO LV E' SEPTAL VELOCITY: 6 CM/S
ECHO LV FRACTIONAL SHORTENING: 33 % (ref 28–44)
ECHO LV INTERNAL DIMENSION DIASTOLE INDEX: 2.38 CM/M2
ECHO LV INTERNAL DIMENSION DIASTOLIC: 5.7 CM (ref 4.2–5.9)
ECHO LV INTERNAL DIMENSION SYSTOLIC INDEX: 1.58 CM/M2
ECHO LV INTERNAL DIMENSION SYSTOLIC: 3.8 CM
ECHO LV IVSD: 1.1 CM (ref 0.6–1)
ECHO LV MASS 2D: 256.7 G (ref 88–224)
ECHO LV MASS INDEX 2D: 107 G/M2 (ref 49–115)
ECHO LV POSTERIOR WALL DIASTOLIC: 1.1 CM (ref 0.6–1)
ECHO LV RELATIVE WALL THICKNESS RATIO: 0.39
ECHO LVOT AREA: 3.1 CM2
ECHO LVOT AV VTI INDEX: 0.72
ECHO LVOT DIAM: 2 CM
ECHO LVOT MEAN GRADIENT: 2 MMHG
ECHO LVOT PEAK GRADIENT: 5 MMHG
ECHO LVOT PEAK VELOCITY: 1.1 M/S
ECHO LVOT STROKE VOLUME INDEX: 32.8 ML/M2
ECHO LVOT SV: 78.8 ML
ECHO LVOT VTI: 25.1 CM
ECHO MV A VELOCITY: 1.29 M/S
ECHO MV AREA VTI: 2.2 CM2
ECHO MV E DECELERATION TIME (DT): 187 MS
ECHO MV E VELOCITY: 1 M/S
ECHO MV E/A RATIO: 0.78
ECHO MV E/E' LATERAL: 14.29
ECHO MV E/E' RATIO (AVERAGED): 15.48
ECHO MV E/E' SEPTAL: 16.67
ECHO MV LVOT VTI INDEX: 1.41
ECHO MV MAX VELOCITY: 1.3 M/S
ECHO MV MEAN GRADIENT: 2 MMHG
ECHO MV MEAN VELOCITY: 0.7 M/S
ECHO MV PEAK GRADIENT: 6 MMHG
ECHO MV VTI: 35.5 CM
ECHO RA AREA 4C: 17.9 CM2
ECHO RA END SYSTOLIC VOLUME APICAL 4 CHAMBER INDEX BSA: 20 ML/M2
ECHO RA VOLUME: 48 ML
ECHO RIGHT VENTRICULAR SYSTOLIC PRESSURE (RVSP): 14 MMHG
ECHO RV TAPSE: 1.7 CM (ref 1.7–?)
ECHO TV REGURGITANT MAX VELOCITY: 1.68 M/S
ECHO TV REGURGITANT PEAK GRADIENT: 11 MMHG
STRESS BASELINE DIAS BP: 100 MMHG
STRESS BASELINE HR: 87 BPM
STRESS BASELINE SYS BP: 141 MMHG
STRESS ESTIMATED WORKLOAD: 1 METS
STRESS PEAK DIAS BP: 105 MMHG
STRESS PEAK SYS BP: 180 MMHG
STRESS PERCENT HR ACHIEVED: 60 %
STRESS POST PEAK HR: 96 BPM
STRESS RATE PRESSURE PRODUCT: NORMAL BPM*MMHG
STRESS STAGE RECOVERY 1 BP: NORMAL MMHG
STRESS STAGE RECOVERY 1 DURATION: 1 MIN:SEC
STRESS STAGE RECOVERY 1 HR: 94 BPM
STRESS STAGE RECOVERY 2 BP: NORMAL MMHG
STRESS STAGE RECOVERY 2 DURATION: 10 MIN:SEC
STRESS STAGE RECOVERY 2 HR: 89 BPM
STRESS TARGET HR: 160 BPM

## 2023-09-21 PROCEDURE — 3430000000 HC RX DIAGNOSTIC RADIOPHARMACEUTICAL: Performed by: FAMILY MEDICINE

## 2023-09-21 PROCEDURE — 93017 CV STRESS TEST TRACING ONLY: CPT

## 2023-09-21 PROCEDURE — 78452 HT MUSCLE IMAGE SPECT MULT: CPT

## 2023-09-21 PROCEDURE — A9500 TC99M SESTAMIBI: HCPCS | Performed by: FAMILY MEDICINE

## 2023-09-21 PROCEDURE — 6360000002 HC RX W HCPCS: Performed by: FAMILY MEDICINE

## 2023-09-21 PROCEDURE — 2580000003 HC RX 258: Performed by: FAMILY MEDICINE

## 2023-09-21 PROCEDURE — 93306 TTE W/DOPPLER COMPLETE: CPT

## 2023-09-21 RX ORDER — TETRAKIS(2-METHOXYISOBUTYLISOCYANIDE)COPPER(I) TETRAFLUOROBORATE 1 MG/ML
34.6 INJECTION, POWDER, LYOPHILIZED, FOR SOLUTION INTRAVENOUS
Status: COMPLETED | OUTPATIENT
Start: 2023-09-21 | End: 2023-09-21

## 2023-09-21 RX ORDER — SODIUM CHLORIDE 9 MG/ML
500 INJECTION, SOLUTION INTRAVENOUS CONTINUOUS PRN
Status: ACTIVE | OUTPATIENT
Start: 2023-09-21 | End: 2023-09-21

## 2023-09-21 RX ORDER — SODIUM CHLORIDE 0.9 % (FLUSH) 0.9 %
5-40 SYRINGE (ML) INJECTION PRN
Status: CANCELLED | OUTPATIENT
Start: 2023-09-21 | End: 2023-09-21

## 2023-09-21 RX ORDER — NITROGLYCERIN 0.4 MG/1
0.4 TABLET SUBLINGUAL EVERY 5 MIN PRN
Status: CANCELLED | OUTPATIENT
Start: 2023-09-21 | End: 2023-09-21

## 2023-09-21 RX ORDER — METOPROLOL TARTRATE 5 MG/5ML
5 INJECTION INTRAVENOUS EVERY 5 MIN PRN
Status: ACTIVE | OUTPATIENT
Start: 2023-09-21 | End: 2023-09-21

## 2023-09-21 RX ORDER — ATROPINE SULFATE 0.1 MG/ML
0.5 INJECTION INTRAVENOUS EVERY 5 MIN PRN
Status: ACTIVE | OUTPATIENT
Start: 2023-09-21 | End: 2023-09-21

## 2023-09-21 RX ORDER — ALBUTEROL SULFATE 90 UG/1
2 AEROSOL, METERED RESPIRATORY (INHALATION) PRN
Status: CANCELLED | OUTPATIENT
Start: 2023-09-21 | End: 2023-09-21

## 2023-09-21 RX ORDER — TETRAKIS(2-METHOXYISOBUTYLISOCYANIDE)COPPER(I) TETRAFLUOROBORATE 1 MG/ML
10.5 INJECTION, POWDER, LYOPHILIZED, FOR SOLUTION INTRAVENOUS
Status: COMPLETED | OUTPATIENT
Start: 2023-09-21 | End: 2023-09-21

## 2023-09-21 RX ORDER — NITROGLYCERIN 0.4 MG/1
0.4 TABLET SUBLINGUAL EVERY 5 MIN PRN
Status: ACTIVE | OUTPATIENT
Start: 2023-09-21 | End: 2023-09-21

## 2023-09-21 RX ORDER — METOPROLOL TARTRATE 5 MG/5ML
5 INJECTION INTRAVENOUS EVERY 5 MIN PRN
Status: CANCELLED | OUTPATIENT
Start: 2023-09-21 | End: 2023-09-21

## 2023-09-21 RX ORDER — ALBUTEROL SULFATE 90 UG/1
2 AEROSOL, METERED RESPIRATORY (INHALATION) PRN
Status: ACTIVE | OUTPATIENT
Start: 2023-09-21 | End: 2023-09-21

## 2023-09-21 RX ORDER — SODIUM CHLORIDE 0.9 % (FLUSH) 0.9 %
10 SYRINGE (ML) INJECTION PRN
Status: DISCONTINUED | OUTPATIENT
Start: 2023-09-21 | End: 2023-09-24 | Stop reason: HOSPADM

## 2023-09-21 RX ORDER — SODIUM CHLORIDE 9 MG/ML
500 INJECTION, SOLUTION INTRAVENOUS CONTINUOUS PRN
Status: CANCELLED | OUTPATIENT
Start: 2023-09-21 | End: 2023-09-21

## 2023-09-21 RX ORDER — ATROPINE SULFATE 0.1 MG/ML
0.5 INJECTION INTRAVENOUS EVERY 5 MIN PRN
Status: CANCELLED | OUTPATIENT
Start: 2023-09-21 | End: 2023-09-21

## 2023-09-21 RX ORDER — REGADENOSON 0.08 MG/ML
0.4 INJECTION, SOLUTION INTRAVENOUS
Status: COMPLETED | OUTPATIENT
Start: 2023-09-21 | End: 2023-09-21

## 2023-09-21 RX ORDER — SODIUM CHLORIDE 0.9 % (FLUSH) 0.9 %
5-40 SYRINGE (ML) INJECTION PRN
Status: ACTIVE | OUTPATIENT
Start: 2023-09-21 | End: 2023-09-21

## 2023-09-21 RX ADMIN — Medication 10.5 MILLICURIE: at 08:40

## 2023-09-21 RX ADMIN — Medication 34.6 MILLICURIE: at 09:57

## 2023-09-21 RX ADMIN — REGADENOSON 0.4 MG: 0.08 INJECTION, SOLUTION INTRAVENOUS at 09:55

## 2023-09-21 RX ADMIN — Medication 10 ML: at 08:40

## 2023-09-22 LAB
NUC STRESS EJECTION FRACTION: 52 %
STRESS BASELINE DIAS BP: 100 MMHG
STRESS BASELINE HR: 87 BPM
STRESS BASELINE SYS BP: 141 MMHG
STRESS ESTIMATED WORKLOAD: 1 METS
STRESS PEAK DIAS BP: 105 MMHG
STRESS PEAK SYS BP: 180 MMHG
STRESS PERCENT HR ACHIEVED: 60 %
STRESS POST PEAK HR: 96 BPM
STRESS RATE PRESSURE PRODUCT: NORMAL BPM*MMHG
STRESS STAGE RECOVERY 1 BP: NORMAL MMHG
STRESS STAGE RECOVERY 1 DURATION: 1 MIN:SEC
STRESS STAGE RECOVERY 1 HR: 94 BPM
STRESS STAGE RECOVERY 2 BP: NORMAL MMHG
STRESS STAGE RECOVERY 2 DURATION: 10 MIN:SEC
STRESS STAGE RECOVERY 2 HR: 89 BPM
STRESS TARGET HR: 160 BPM
TID: 0.95

## 2023-10-03 LAB
STRESS BASELINE DIAS BP: 100 MMHG
STRESS BASELINE HR: 87 BPM
STRESS BASELINE SYS BP: 141 MMHG
STRESS ESTIMATED WORKLOAD: 1 METS
STRESS PEAK DIAS BP: 105 MMHG
STRESS PEAK SYS BP: 180 MMHG
STRESS PERCENT HR ACHIEVED: 60 %
STRESS POST PEAK HR: 96 BPM
STRESS RATE PRESSURE PRODUCT: NORMAL BPM*MMHG
STRESS STAGE RECOVERY 1 BP: NORMAL MMHG
STRESS STAGE RECOVERY 1 DURATION: 1 MIN:SEC
STRESS STAGE RECOVERY 1 HR: 94 BPM
STRESS STAGE RECOVERY 2 BP: NORMAL MMHG
STRESS STAGE RECOVERY 2 DURATION: 10 MIN:SEC
STRESS STAGE RECOVERY 2 HR: 89 BPM
STRESS TARGET HR: 160 BPM

## 2023-10-18 ENCOUNTER — OFFICE VISIT (OUTPATIENT)
Dept: ORTHOPEDIC SURGERY | Age: 63
End: 2023-10-18
Payer: COMMERCIAL

## 2023-10-18 DIAGNOSIS — M25.552 LEFT HIP PAIN: Primary | ICD-10-CM

## 2023-10-18 PROCEDURE — 3017F COLORECTAL CA SCREEN DOC REV: CPT | Performed by: ORTHOPAEDIC SURGERY

## 2023-10-18 PROCEDURE — G8428 CUR MEDS NOT DOCUMENT: HCPCS | Performed by: ORTHOPAEDIC SURGERY

## 2023-10-18 PROCEDURE — 99213 OFFICE O/P EST LOW 20 MIN: CPT | Performed by: ORTHOPAEDIC SURGERY

## 2023-10-18 PROCEDURE — G8484 FLU IMMUNIZE NO ADMIN: HCPCS | Performed by: ORTHOPAEDIC SURGERY

## 2023-10-18 PROCEDURE — 1036F TOBACCO NON-USER: CPT | Performed by: ORTHOPAEDIC SURGERY

## 2023-10-18 PROCEDURE — G8417 CALC BMI ABV UP PARAM F/U: HCPCS | Performed by: ORTHOPAEDIC SURGERY

## 2023-10-18 RX ORDER — TRAMADOL HYDROCHLORIDE 50 MG/1
50 TABLET ORAL EVERY 4 HOURS PRN
Qty: 42 TABLET | Refills: 0 | Status: SHIPPED | OUTPATIENT
Start: 2023-10-18 | End: 2023-10-25

## 2023-10-18 NOTE — PROGRESS NOTES
Chikis León M.D.            [address] [facility], 230 [facility], [address] [place] [address] [address]           Dept Phone: [phone]           Dept Fax:  [address]          Dept Phone: [phone]           Dept Fax:  [phone]      Chief Compliant:  Chief Complaint   Patient presents with    Pain     Lt hip        History of Present Illness: This is a 66 y.o. male who presents to the clinic today for evaluation / follow up of severe left hip pain. Please refer to all previous dictations. Patient is a 21-year-old gentleman who has fairly severe degenerative joint disease of his left hip. Patient has had 2 IR injections to his left hip most recently this past July he states that for 3 to 4 weeks he has excellent relief with this but is after that he is back to his original status. 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 He is here today to discuss hip arthroplasty. Review of Systems   Constitutional: Negative for fever, chills, sweats. Eyes: Negative for changes in vision, or pain. HENT: Negative for ear ache, epistaxis, or sore throat. Respiratory/Cardio: Negative for Chest pain, palpitations, SOB, or cough. Gastrointestinal: Negative for abdominal pain, N/V/D. Genitourinary: Negative for dysuria, frequency, urgency, or hematuria. Neurological: Negative for headache, numbness, or weakness. Integumentary: Negative for rash, itching, laceration, or abrasion. Musculoskeletal: Positive for Pain (Lt hip)       Physical Exam:  Constitutional: Patient is oriented to person, place, and time. Patient appears well-developed and well nourished.    HENT: Negative otherwise noted  Head: Normocephalic

## 2023-10-23 ENCOUNTER — HOSPITAL ENCOUNTER (OUTPATIENT)
Age: 63
Discharge: HOME | End: 2023-10-23
Payer: COMMERCIAL

## 2023-10-23 LAB
ANION GAP SERPL CALCULATED.3IONS-SCNC: 7 MMOL/L (ref 9–17)
BASOPHILS # BLD: <0.03 K/UL (ref 0–0.2)
BASOPHILS NFR BLD: 0 % (ref 0–2)
BUN SERPL-MCNC: 13 MG/DL (ref 8–23)
CALCIUM SERPL-MCNC: 9.3 MG/DL (ref 8.6–10.4)
CHLORIDE SERPL-SCNC: 103 MMOL/L (ref 98–107)
CO2 SERPL-SCNC: 29 MMOL/L (ref 20–31)
CREAT SERPL-MCNC: 0.8 MG/DL (ref 0.7–1.2)
EOSINOPHIL # BLD: 0.17 K/UL (ref 0–0.44)
EOSINOPHILS RELATIVE PERCENT: 3 % (ref 1–4)
ERYTHROCYTE [DISTWIDTH] IN BLOOD BY AUTOMATED COUNT: 13.9 % (ref 11.8–14.4)
GFR SERPL CREATININE-BSD FRML MDRD: >60 ML/MIN/1.73M2
GLUCOSE SERPL-MCNC: 110 MG/DL (ref 70–99)
HCT VFR BLD AUTO: 45.5 % (ref 40.7–50.3)
HGB BLD-MCNC: 16 G/DL (ref 13–17)
IMM GRANULOCYTES # BLD AUTO: <0.03 K/UL (ref 0–0.3)
IMM GRANULOCYTES NFR BLD: 0 %
INR PPP: 1
LYMPHOCYTES NFR BLD: 1.18 K/UL (ref 1.1–3.7)
LYMPHOCYTES RELATIVE PERCENT: 21 % (ref 24–43)
MCH RBC QN AUTO: 33.6 PG (ref 25.2–33.5)
MCHC RBC AUTO-ENTMCNC: 35.2 G/DL (ref 28.4–34.8)
MCV RBC AUTO: 95.6 FL (ref 82.6–102.9)
MONOCYTES NFR BLD: 0.5 K/UL (ref 0.1–1.2)
MONOCYTES NFR BLD: 9 % (ref 3–12)
NEUTROPHILS NFR BLD: 67 % (ref 36–65)
NEUTS SEG NFR BLD: 3.7 K/UL (ref 1.5–8.1)
NRBC BLD-RTO: 0 PER 100 WBC
PLATELET # BLD AUTO: 183 K/UL (ref 138–453)
PMV BLD AUTO: 9.2 FL (ref 8.1–13.5)
POTASSIUM SERPL-SCNC: 4.4 MMOL/L (ref 3.7–5.3)
PROTHROMBIN TIME: 13.4 SEC (ref 11.7–14.9)
RBC # BLD AUTO: 4.76 M/UL (ref 4.21–5.77)
SODIUM SERPL-SCNC: 139 MMOL/L (ref 135–144)
WBC OTHER # BLD: 5.6 K/UL (ref 3.5–11.3)

## 2023-10-23 PROCEDURE — 85025 COMPLETE CBC W/AUTO DIFF WBC: CPT

## 2023-10-23 PROCEDURE — 80048 BASIC METABOLIC PNL TOTAL CA: CPT

## 2023-10-23 PROCEDURE — 85610 PROTHROMBIN TIME: CPT

## 2023-10-23 PROCEDURE — 36415 COLL VENOUS BLD VENIPUNCTURE: CPT

## 2023-10-27 RX ORDER — CARVEDILOL 6.25 MG/1
6.25 TABLET ORAL 2 TIMES DAILY WITH MEALS
COMMUNITY

## 2023-10-30 ENCOUNTER — HOSPITAL ENCOUNTER (OUTPATIENT)
Age: 63
Discharge: HOME | End: 2023-10-30
Attending: INTERNAL MEDICINE | Admitting: INTERNAL MEDICINE
Payer: COMMERCIAL

## 2023-10-30 VITALS
DIASTOLIC BLOOD PRESSURE: 97 MMHG | RESPIRATION RATE: 18 BRPM | BODY MASS INDEX: 49.44 KG/M2 | SYSTOLIC BLOOD PRESSURE: 137 MMHG | HEIGHT: 67 IN | WEIGHT: 315 LBS | HEART RATE: 75 BPM | OXYGEN SATURATION: 95 % | TEMPERATURE: 97.5 F

## 2023-10-30 DIAGNOSIS — R94.39 ABNORMAL STRESS TEST: ICD-10-CM

## 2023-10-30 LAB — ECHO BSA: 2.63 M2

## 2023-10-30 PROCEDURE — 2500000003 HC RX 250 WO HCPCS

## 2023-10-30 PROCEDURE — 2709999900 HC NON-CHARGEABLE SUPPLY: Performed by: INTERNAL MEDICINE

## 2023-10-30 PROCEDURE — 93005 ELECTROCARDIOGRAM TRACING: CPT

## 2023-10-30 PROCEDURE — 99152 MOD SED SAME PHYS/QHP 5/>YRS: CPT | Performed by: INTERNAL MEDICINE

## 2023-10-30 PROCEDURE — 6360000002 HC RX W HCPCS: Performed by: INTERNAL MEDICINE

## 2023-10-30 PROCEDURE — C1894 INTRO/SHEATH, NON-LASER: HCPCS | Performed by: INTERNAL MEDICINE

## 2023-10-30 PROCEDURE — 2580000003 HC RX 258: Performed by: INTERNAL MEDICINE

## 2023-10-30 PROCEDURE — 93458 L HRT ARTERY/VENTRICLE ANGIO: CPT | Performed by: INTERNAL MEDICINE

## 2023-10-30 PROCEDURE — 2500000003 HC RX 250 WO HCPCS: Performed by: INTERNAL MEDICINE

## 2023-10-30 PROCEDURE — 94761 N-INVAS EAR/PLS OXIMETRY MLT: CPT

## 2023-10-30 PROCEDURE — 2700000000 HC OXYGEN THERAPY PER DAY

## 2023-10-30 PROCEDURE — 6360000004 HC RX CONTRAST MEDICATION

## 2023-10-30 PROCEDURE — 99153 MOD SED SAME PHYS/QHP EA: CPT | Performed by: INTERNAL MEDICINE

## 2023-10-30 PROCEDURE — C1769 GUIDE WIRE: HCPCS | Performed by: INTERNAL MEDICINE

## 2023-10-30 PROCEDURE — 6360000002 HC RX W HCPCS

## 2023-10-30 RX ORDER — MIDAZOLAM HYDROCHLORIDE 1 MG/ML
INJECTION INTRAMUSCULAR; INTRAVENOUS PRN
Status: DISCONTINUED | OUTPATIENT
Start: 2023-10-30 | End: 2023-10-30 | Stop reason: HOSPADM

## 2023-10-30 RX ORDER — ACETAMINOPHEN 325 MG/1
650 TABLET ORAL EVERY 4 HOURS PRN
Status: DISCONTINUED | OUTPATIENT
Start: 2023-10-30 | End: 2023-10-30 | Stop reason: HOSPADM

## 2023-10-30 RX ORDER — SODIUM CHLORIDE 9 MG/ML
INJECTION, SOLUTION INTRAVENOUS CONTINUOUS
Status: ACTIVE | OUTPATIENT
Start: 2023-10-30 | End: 2023-10-30

## 2023-10-30 RX ORDER — ASPIRIN 81 MG/1
81 TABLET, CHEWABLE ORAL DAILY
COMMUNITY

## 2023-10-30 RX ORDER — SODIUM CHLORIDE 9 MG/ML
INJECTION, SOLUTION INTRAVENOUS CONTINUOUS
Status: DISCONTINUED | OUTPATIENT
Start: 2023-10-30 | End: 2023-10-30 | Stop reason: HOSPADM

## 2023-10-30 RX ORDER — FENTANYL CITRATE 50 UG/ML
INJECTION, SOLUTION INTRAMUSCULAR; INTRAVENOUS PRN
Status: DISCONTINUED | OUTPATIENT
Start: 2023-10-30 | End: 2023-10-30 | Stop reason: HOSPADM

## 2023-10-30 RX ORDER — SODIUM CHLORIDE 0.9 % (FLUSH) 0.9 %
5-40 SYRINGE (ML) INJECTION EVERY 12 HOURS SCHEDULED
Status: DISCONTINUED | OUTPATIENT
Start: 2023-10-30 | End: 2023-10-30 | Stop reason: HOSPADM

## 2023-10-30 RX ORDER — HEPARIN SODIUM 1000 [USP'U]/ML
INJECTION, SOLUTION INTRAVENOUS; SUBCUTANEOUS PRN
Status: DISCONTINUED | OUTPATIENT
Start: 2023-10-30 | End: 2023-10-30 | Stop reason: HOSPADM

## 2023-10-30 RX ORDER — NITROGLYCERIN 20 MG/100ML
INJECTION INTRAVENOUS CONTINUOUS PRN
Status: COMPLETED | OUTPATIENT
Start: 2023-10-30 | End: 2023-10-30

## 2023-10-30 RX ORDER — ONDANSETRON 2 MG/ML
4 INJECTION INTRAMUSCULAR; INTRAVENOUS EVERY 6 HOURS PRN
Status: DISCONTINUED | OUTPATIENT
Start: 2023-10-30 | End: 2023-10-30 | Stop reason: HOSPADM

## 2023-10-30 RX ORDER — TRAMADOL HYDROCHLORIDE 50 MG/1
50 TABLET ORAL EVERY 6 HOURS PRN
COMMUNITY

## 2023-10-30 RX ORDER — SODIUM CHLORIDE 0.9 % (FLUSH) 0.9 %
5-40 SYRINGE (ML) INJECTION PRN
Status: DISCONTINUED | OUTPATIENT
Start: 2023-10-30 | End: 2023-10-30 | Stop reason: HOSPADM

## 2023-10-30 RX ORDER — SODIUM CHLORIDE 9 MG/ML
INJECTION, SOLUTION INTRAVENOUS PRN
Status: DISCONTINUED | OUTPATIENT
Start: 2023-10-30 | End: 2023-10-30 | Stop reason: HOSPADM

## 2023-10-30 RX ORDER — VERAPAMIL HYDROCHLORIDE 2.5 MG/ML
INJECTION, SOLUTION INTRAVENOUS PRN
Status: DISCONTINUED | OUTPATIENT
Start: 2023-10-30 | End: 2023-10-30 | Stop reason: HOSPADM

## 2023-10-30 RX ADMIN — SODIUM CHLORIDE: 9 INJECTION, SOLUTION INTRAVENOUS at 11:49

## 2023-10-30 RX ADMIN — SODIUM CHLORIDE: 9 INJECTION, SOLUTION INTRAVENOUS at 13:58

## 2023-10-30 ASSESSMENT — PAIN - FUNCTIONAL ASSESSMENT: PAIN_FUNCTIONAL_ASSESSMENT: 0-10

## 2023-10-30 NOTE — PROGRESS NOTES
Patient arrived to room 2035 via bed post cardiac catheterization. Patient is alert and oriented and denies pain. Bed locked and placed in lowest position. Side rails up x2. Call light placed at the patient's bedside. Bedside report completed. Right Radial site assessed. No redness, drainage, swelling, or hematoma present. TR Band is on, Pulses palpable. Vital signs obtained (see flowsheet). RN educated the patient on position orders and activity restrictions for cath recovery. Patient verbalizes understanding. 200ml/hr NS infusing, will continue to monitor.

## 2023-10-30 NOTE — CARE COORDINATION
Discharge planning    Heart cath completed with no stent placement. Plan is home later today independently.

## 2023-10-30 NOTE — PROGRESS NOTES
Discharge Note:      All discharge instructions given at this time as well as all patient belongings returned to patient. Pt denies any further questions regarding discharge at this time. Pt given also given discharge packet with unit letter, discharge instructions/restrictions and medication handouts regarding all discharge medications and side effects. Pt denies any further issues at this time. Post cath restrictions explained to pt w instructions packet given. Pressure dressing applied to Rt Radial insertion site when TR band taken off, no complications, pt ambulated around unit, no complications tolerated well. Pt wheeled out to front discharge doors at this time. Pt left premises without any issues in private vehicle at this time.

## 2023-10-31 LAB
EKG ATRIAL RATE: 86 BPM
EKG P AXIS: 17 DEGREES
EKG P-R INTERVAL: 142 MS
EKG Q-T INTERVAL: 364 MS
EKG QRS DURATION: 96 MS
EKG QTC CALCULATION (BAZETT): 435 MS
EKG R AXIS: -44 DEGREES
EKG T AXIS: 114 DEGREES
EKG VENTRICULAR RATE: 86 BPM

## 2023-11-13 DIAGNOSIS — M25.552 LEFT HIP PAIN: Primary | ICD-10-CM

## 2023-11-14 RX ORDER — TRAMADOL HYDROCHLORIDE 50 MG/1
TABLET ORAL
Qty: 42 TABLET | Refills: 0 | Status: SHIPPED | OUTPATIENT
Start: 2023-11-14 | End: 2023-11-28

## 2023-11-15 ENCOUNTER — TELEPHONE (OUTPATIENT)
Dept: ORTHOPEDIC SURGERY | Age: 63
End: 2023-11-15

## 2023-11-15 NOTE — TELEPHONE ENCOUNTER
Pt will come in and fill out an SAM for his wifes [place] paperwork. He was informed I cant fax paperwork without his authorization.

## 2023-11-24 RX ORDER — ATORVASTATIN CALCIUM 10 MG/1
TABLET, FILM COATED ORAL
COMMUNITY
Start: 2023-11-02

## 2023-11-24 RX ORDER — DICLOFENAC SODIUM 75 MG/1
TABLET, DELAYED RELEASE ORAL
COMMUNITY
End: 2023-11-28

## 2023-11-27 NOTE — H&P (VIEW-ONLY)
HISTORY and [address]       NAME:  Kristine Puentes  MRN: [id number]   YOB: 1960   Date: 11/28/2023   Age: 66 y.o. Gender: male       COMPLAINT AND PRESENT HISTORY:     Kristine Puentes is 66 y.o.  male, here for preadmission testing related to primary osteoarthritis of left hip with scheduled HIP TOTAL ARTHROPLASTY MINIMALLY INVASIVE LEFT ASI per Dr. Erika Hsieh. Below italics a portion of ortho office visit note by Dr. Erika Hsieh dated 10/18/23: Reviewed. History of Present Illness: This is a 66 y.o. male who presents to the clinic today for evaluation / follow up of severe left hip pain. Please refer to all previous dictations. Patient is a 59-year-old gentleman who has fairly severe degenerative joint disease of his left hip. Patient has had 2 IR injections to his left hip most recently this past July he states that for 3 to 4 weeks he has excellent relief with this but is after that he is back to his original status. 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 He has difficulty to starting to do daily activities. He is here today to discuss hip arthroplasty. UPDATE:   Symptoms started a few years ago. Pt c/o pain to left groin and left lateral thigh with radiation into left knee. Describes pain as constant aching with occasional sharp. Pain worse in the morning. Rating pain 9/10. Takes tramadol with good relief. Sitting and getting up from seated position aggravates pain. Nothing in particular helps alleviate symptoms. Pt has lower back pain as well. Pt has occasional numbness and tingling to back of legs he attributes to lower back pain. Denies recent fall, injury or trauma. Denies redness or rash over left hip. Denies hx of MRSA infection. PMHx includes:    Cardiomyopathy, HTN, HLD, chest pain, aneurysmal coronary disease, Abnormal stress test in September, 2023.  Pt s/p left heart cath in

## 2023-11-27 NOTE — H&P
HISTORY and [address]       NAME:  Fam Taylor  MRN: [id number]   YOB: 1960   Date: 11/28/2023   Age: 66 y.o. Gender: male       COMPLAINT AND PRESENT HISTORY:     Fam Taylor is 66 y.o.  male, here for preadmission testing related to primary osteoarthritis of left hip with scheduled HIP TOTAL ARTHROPLASTY MINIMALLY INVASIVE LEFT ASI per Dr. Kathaleen Kussmaul. Below italics a portion of ortho office visit note by Dr. Kathaleen Kussmaul dated 10/18/23: Reviewed. History of Present Illness: This is a 66 y.o. male who presents to the clinic today for evaluation / follow up of severe left hip pain. Please refer to all previous dictations. Patient is a 71-year-old gentleman who has fairly severe degenerative joint disease of his left hip. Patient has had 2 IR injections to his left hip most recently this past July he states that for 3 to 4 weeks he has excellent relief with this but is after that he is back to his original status. 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 He has difficulty to starting to do daily activities. He is here today to discuss hip arthroplasty. UPDATE:   Symptoms started a few years ago. Pt c/o pain to left groin and left lateral thigh with radiation into left knee. Describes pain as constant aching with occasional sharp. Pain worse in the morning. Rating pain 9/10. Takes tramadol with good relief. Sitting and getting up from seated position aggravates pain. Nothing in particular helps alleviate symptoms. Pt has lower back pain as well. Pt has occasional numbness and tingling to back of legs he attributes to lower back pain. Denies recent fall, injury or trauma. Denies redness or rash over left hip. Denies hx of MRSA infection. PMHx includes:    Cardiomyopathy, HTN, HLD, chest pain, aneurysmal coronary disease, Abnormal stress test in September, 2023.  Pt s/p left heart cath in

## 2023-11-28 ENCOUNTER — HOSPITAL ENCOUNTER (OUTPATIENT)
Dept: PREADMISSION TESTING | Age: 63
Discharge: HOME | End: 2023-12-02
Payer: COMMERCIAL

## 2023-11-28 VITALS
WEIGHT: 310 LBS | RESPIRATION RATE: 18 BRPM | TEMPERATURE: 98.2 F | BODY MASS INDEX: 46.98 KG/M2 | HEIGHT: 68 IN | HEART RATE: 75 BPM | DIASTOLIC BLOOD PRESSURE: 92 MMHG | SYSTOLIC BLOOD PRESSURE: 146 MMHG | OXYGEN SATURATION: 94 %

## 2023-11-28 LAB
ABO + RH BLD: NORMAL
ANION GAP SERPL CALCULATED.3IONS-SCNC: 12 MMOL/L (ref 9–17)
ARM BAND NUMBER: NORMAL
BASOPHILS # BLD: 0 K/UL (ref 0–0.2)
BASOPHILS NFR BLD: 0 % (ref 0–2)
BILIRUB UR QL STRIP: NEGATIVE
BLOOD BANK SAMPLE EXPIRATION: NORMAL
BLOOD GROUP ANTIBODIES SERPL: NEGATIVE
BUN SERPL-MCNC: 14 MG/DL (ref 8–23)
CALCIUM SERPL-MCNC: 9.5 MG/DL (ref 8.6–10.4)
CHLORIDE SERPL-SCNC: 100 MMOL/L (ref 98–107)
CLARITY UR: CLEAR
CO2 SERPL-SCNC: 28 MMOL/L (ref 20–31)
COLOR UR: YELLOW
COMMENT: NORMAL
CREAT SERPL-MCNC: 0.9 MG/DL (ref 0.7–1.2)
EOSINOPHIL # BLD: 0.2 K/UL (ref 0–0.4)
EOSINOPHILS RELATIVE PERCENT: 4 % (ref 0–4)
ERYTHROCYTE [DISTWIDTH] IN BLOOD BY AUTOMATED COUNT: 14.3 % (ref 11.5–14.9)
GFR SERPL CREATININE-BSD FRML MDRD: >60 ML/MIN/1.73M2
GLUCOSE SERPL-MCNC: 158 MG/DL (ref 70–99)
GLUCOSE UR STRIP-MCNC: NEGATIVE MG/DL
HCT VFR BLD AUTO: 44.6 % (ref 41–53)
HGB BLD-MCNC: 15 G/DL (ref 13.5–17.5)
HGB UR QL STRIP.AUTO: NEGATIVE
KETONES UR STRIP-MCNC: NEGATIVE MG/DL
LEUKOCYTE ESTERASE UR QL STRIP: NEGATIVE
LYMPHOCYTES NFR BLD: 0.9 K/UL (ref 1–4.8)
LYMPHOCYTES RELATIVE PERCENT: 19 % (ref 24–44)
MCH RBC QN AUTO: 32.5 PG (ref 26–34)
MCHC RBC AUTO-ENTMCNC: 33.7 G/DL (ref 31–37)
MCV RBC AUTO: 96.4 FL (ref 80–100)
MONOCYTES NFR BLD: 0.4 K/UL (ref 0.1–1.3)
MONOCYTES NFR BLD: 9 % (ref 1–7)
NEUTROPHILS NFR BLD: 68 % (ref 36–66)
NEUTS SEG NFR BLD: 3.1 K/UL (ref 1.3–9.1)
NITRITE UR QL STRIP: NEGATIVE
PH UR STRIP: 6.5 [PH] (ref 5–8)
PLATELET # BLD AUTO: 177 K/UL (ref 150–450)
PMV BLD AUTO: 7.8 FL (ref 6–12)
POTASSIUM SERPL-SCNC: 4 MMOL/L (ref 3.7–5.3)
PROT UR STRIP-MCNC: NEGATIVE MG/DL
RBC # BLD AUTO: 4.62 M/UL (ref 4.5–5.9)
SODIUM SERPL-SCNC: 140 MMOL/L (ref 135–144)
SP GR UR STRIP: 1.01 (ref 1–1.03)
UROBILINOGEN UR STRIP-ACNC: NORMAL EU/DL (ref 0–1)
WBC OTHER # BLD: 4.6 K/UL (ref 3.5–11)

## 2023-11-28 PROCEDURE — 85025 COMPLETE CBC W/AUTO DIFF WBC: CPT

## 2023-11-28 PROCEDURE — 86850 RBC ANTIBODY SCREEN: CPT

## 2023-11-28 PROCEDURE — 86901 BLOOD TYPING SEROLOGIC RH(D): CPT

## 2023-11-28 PROCEDURE — 80048 BASIC METABOLIC PNL TOTAL CA: CPT

## 2023-11-28 PROCEDURE — 81003 URINALYSIS AUTO W/O SCOPE: CPT

## 2023-11-28 PROCEDURE — 36415 COLL VENOUS BLD VENIPUNCTURE: CPT

## 2023-11-28 PROCEDURE — 86900 BLOOD TYPING SEROLOGIC ABO: CPT

## 2023-11-28 PROCEDURE — 87641 MR-STAPH DNA AMP PROBE: CPT

## 2023-11-28 RX ORDER — NAPROXEN SODIUM 220 MG
220 TABLET ORAL 2 TIMES DAILY WITH MEALS
COMMUNITY

## 2023-11-28 ASSESSMENT — PAIN DESCRIPTION - ORIENTATION: ORIENTATION: LEFT

## 2023-11-28 ASSESSMENT — ENCOUNTER SYMPTOMS
ABDOMINAL PAIN: 0
VOMITING: 0
BACK PAIN: 1
ANAL BLEEDING: 1
COUGH: 0
SHORTNESS OF BREATH: 1
CONSTIPATION: 1
NAUSEA: 0
DIARRHEA: 0
SORE THROAT: 0

## 2023-11-28 ASSESSMENT — PAIN DESCRIPTION - PAIN TYPE: TYPE: ACUTE PAIN

## 2023-11-28 ASSESSMENT — PAIN DESCRIPTION - DESCRIPTORS: DESCRIPTORS: ACHING

## 2023-11-28 ASSESSMENT — PAIN DESCRIPTION - LOCATION: LOCATION: HIP

## 2023-11-28 ASSESSMENT — PAIN SCALES - GENERAL: PAINLEVEL_OUTOF10: 9

## 2023-11-28 NOTE — DISCHARGE INSTRUCTIONS
Pre-op Instructions For Out-Patient Surgery    Medication Instructions:  Please stop herbs and any supplements now (includes vitamins and minerals). Please contact your surgeon and prescribing physician for pre-op instructions for any blood thinners. Aspirin  as directed. If you have inhalers/aerosol treatments at home, please use them the morning of your surgery and bring the inhalers with you to the hospital.    Please take the following medications the morning of your surgery with a sip of water:     tramadol if needed, carvedilol    Surgery Instructions:  After midnight before surgery:  Do not eat or drink anything, including water, mints, gum, and hard candy. You may brush your teeth without swallowing. No smoking, chewing tobacco, or street drugs. Please shower or bathe before surgery. If you were given Surgical Scrub Chlorhexidine Gluconate Liquid (CHG), please shower the night before and the morning of your surgery following the detailed instructions you received during your pre-admission visit. Please do not wear any cologne, lotion, powder, deodorant, jewelry, piercings, perfume, makeup, nail polish, hair accessories, or hair spray on the day of surgery. Wear loose comfortable clothing. Leave your valuables at home but bring a payment source for any after-surgery prescriptions you plan to fill at [facility]. Bring a storage case for any glasses/contacts. An adult who is responsible for you MUST drive you home and should be with you for the first 24 hours after surgery. If having out-patient knee and foot surgeries, please arrange for planned crutches, walker, or wheelchair before arriving to the hospital.    The Day of Surgery:  Arrive at [facility] AT [facility] Surgery Entrance at the time directed by your surgeon and check in at the desk. If you have a living will or healthcare power of , please bring a copy.     You will be

## 2023-11-29 LAB
MRSA, DNA, NASAL: NEGATIVE
SPECIMEN DESCRIPTION: NORMAL

## 2023-12-05 ENCOUNTER — TELEPHONE (OUTPATIENT)
Dept: ORTHOPEDIC SURGERY | Age: 63
End: 2023-12-05

## 2023-12-05 NOTE — TELEPHONE ENCOUNTER
I contacted the patient to let him know that he needs to stop by the office to fill out and sign an SAM for [employer]. He stated that he will be in to do so.

## 2023-12-11 ENCOUNTER — ANESTHESIA EVENT (OUTPATIENT)
Dept: OPERATING ROOM | Age: 63
End: 2023-12-11
Payer: COMMERCIAL

## 2023-12-12 ENCOUNTER — HOSPITAL ENCOUNTER (OUTPATIENT)
Age: 63
Discharge: HOME | End: 2023-12-12
Attending: ORTHOPAEDIC SURGERY | Admitting: ORTHOPAEDIC SURGERY
Payer: COMMERCIAL

## 2023-12-12 ENCOUNTER — APPOINTMENT (OUTPATIENT)
Dept: GENERAL RADIOLOGY | Age: 63
End: 2023-12-12
Attending: ORTHOPAEDIC SURGERY
Payer: COMMERCIAL

## 2023-12-12 ENCOUNTER — ANESTHESIA (OUTPATIENT)
Dept: OPERATING ROOM | Age: 63
End: 2023-12-12
Payer: COMMERCIAL

## 2023-12-12 VITALS
SYSTOLIC BLOOD PRESSURE: 118 MMHG | DIASTOLIC BLOOD PRESSURE: 83 MMHG | BODY MASS INDEX: 46.98 KG/M2 | HEART RATE: 90 BPM | OXYGEN SATURATION: 94 % | WEIGHT: 310 LBS | TEMPERATURE: 97.3 F | HEIGHT: 68 IN | RESPIRATION RATE: 14 BRPM

## 2023-12-12 DIAGNOSIS — M16.12 PRIMARY OSTEOARTHRITIS OF LEFT HIP: Primary | ICD-10-CM

## 2023-12-12 PROCEDURE — 2709999900 HC NON-CHARGEABLE SUPPLY: Performed by: ORTHOPAEDIC SURGERY

## 2023-12-12 PROCEDURE — C1776 JOINT DEVICE (IMPLANTABLE): HCPCS | Performed by: ORTHOPAEDIC SURGERY

## 2023-12-12 PROCEDURE — 2580000003 HC RX 258: Performed by: ORTHOPAEDIC SURGERY

## 2023-12-12 PROCEDURE — 2580000003 HC RX 258: Performed by: ANESTHESIOLOGY

## 2023-12-12 PROCEDURE — 6360000002 HC RX W HCPCS: Performed by: ORTHOPAEDIC SURGERY

## 2023-12-12 PROCEDURE — 97116 GAIT TRAINING THERAPY: CPT

## 2023-12-12 PROCEDURE — 6370000000 HC RX 637 (ALT 250 FOR IP): Performed by: ORTHOPAEDIC SURGERY

## 2023-12-12 PROCEDURE — 97535 SELF CARE MNGMENT TRAINING: CPT

## 2023-12-12 PROCEDURE — 2500000003 HC RX 250 WO HCPCS: Performed by: ORTHOPAEDIC SURGERY

## 2023-12-12 PROCEDURE — 7100000001 HC PACU RECOVERY - ADDTL 15 MIN: Performed by: ORTHOPAEDIC SURGERY

## 2023-12-12 PROCEDURE — 7100000000 HC PACU RECOVERY - FIRST 15 MIN: Performed by: ORTHOPAEDIC SURGERY

## 2023-12-12 PROCEDURE — 2580000003 HC RX 258: Performed by: NURSE ANESTHETIST, CERTIFIED REGISTERED

## 2023-12-12 PROCEDURE — 6360000002 HC RX W HCPCS: Performed by: NURSE ANESTHETIST, CERTIFIED REGISTERED

## 2023-12-12 PROCEDURE — 2500000003 HC RX 250 WO HCPCS: Performed by: ANESTHESIOLOGY

## 2023-12-12 PROCEDURE — 3600000013 HC SURGERY LEVEL 3 ADDTL 15MIN: Performed by: ORTHOPAEDIC SURGERY

## 2023-12-12 PROCEDURE — 6360000002 HC RX W HCPCS: Performed by: ANESTHESIOLOGY

## 2023-12-12 PROCEDURE — 3700000001 HC ADD 15 MINUTES (ANESTHESIA): Performed by: ORTHOPAEDIC SURGERY

## 2023-12-12 PROCEDURE — 2720000010 HC SURG SUPPLY STERILE: Performed by: ORTHOPAEDIC SURGERY

## 2023-12-12 PROCEDURE — 2500000003 HC RX 250 WO HCPCS: Performed by: NURSE ANESTHETIST, CERTIFIED REGISTERED

## 2023-12-12 PROCEDURE — 97165 OT EVAL LOW COMPLEX 30 MIN: CPT

## 2023-12-12 PROCEDURE — 3700000000 HC ANESTHESIA ATTENDED CARE: Performed by: ORTHOPAEDIC SURGERY

## 2023-12-12 PROCEDURE — 97530 THERAPEUTIC ACTIVITIES: CPT

## 2023-12-12 PROCEDURE — 3600000003 HC SURGERY LEVEL 3 BASE: Performed by: ORTHOPAEDIC SURGERY

## 2023-12-12 PROCEDURE — A4216 STERILE WATER/SALINE, 10 ML: HCPCS | Performed by: ORTHOPAEDIC SURGERY

## 2023-12-12 PROCEDURE — 97162 PT EVAL MOD COMPLEX 30 MIN: CPT

## 2023-12-12 DEVICE — HEAD FEM DIA40MM HIP BIOLOX DELT OPT FOR G7 ACET SYS: Type: IMPLANTABLE DEVICE | Site: HIP | Status: FUNCTIONAL

## 2023-12-12 DEVICE — LINER ACET NEUT G 40 MM LONGEVITY G7: Type: IMPLANTABLE DEVICE | Site: HIP | Status: FUNCTIONAL

## 2023-12-12 DEVICE — SLEEVE FEM -3MM OFFSET HIP TYP 1 TAPR FOR CERAMIC BIOLOX: Type: IMPLANTABLE DEVICE | Site: HIP | Status: FUNCTIONAL

## 2023-12-12 DEVICE — G7 FINNED 4 HOLE SHELL 58G: Type: IMPLANTABLE DEVICE | Site: HIP | Status: FUNCTIONAL

## 2023-12-12 DEVICE — STEM FEM SZ 11 L107.5MM 133DEG HIP PPS HI OFFSET TYP 1 TAPR: Type: IMPLANTABLE DEVICE | Site: HIP | Status: FUNCTIONAL

## 2023-12-12 RX ORDER — METOCLOPRAMIDE HYDROCHLORIDE 5 MG/ML
10 INJECTION INTRAMUSCULAR; INTRAVENOUS
Status: DISCONTINUED | OUTPATIENT
Start: 2023-12-12 | End: 2023-12-12 | Stop reason: HOSPADM

## 2023-12-12 RX ORDER — EPHEDRINE SULFATE/0.9% NACL/PF 50 MG/5 ML
SYRINGE (ML) INTRAVENOUS PRN
Status: DISCONTINUED | OUTPATIENT
Start: 2023-12-12 | End: 2023-12-12 | Stop reason: SDUPTHER

## 2023-12-12 RX ORDER — GABAPENTIN 400 MG/1
800 CAPSULE ORAL ONCE
Status: COMPLETED | OUTPATIENT
Start: 2023-12-12 | End: 2023-12-12

## 2023-12-12 RX ORDER — SODIUM CHLORIDE 0.9 % (FLUSH) 0.9 %
5-40 SYRINGE (ML) INJECTION PRN
Status: DISCONTINUED | OUTPATIENT
Start: 2023-12-12 | End: 2023-12-12 | Stop reason: HOSPADM

## 2023-12-12 RX ORDER — SODIUM CHLORIDE, SODIUM LACTATE, POTASSIUM CHLORIDE, CALCIUM CHLORIDE 600; 310; 30; 20 MG/100ML; MG/100ML; MG/100ML; MG/100ML
INJECTION, SOLUTION INTRAVENOUS CONTINUOUS
Status: DISCONTINUED | OUTPATIENT
Start: 2023-12-12 | End: 2023-12-12 | Stop reason: HOSPADM

## 2023-12-12 RX ORDER — SCOLOPAMINE TRANSDERMAL SYSTEM 1 MG/1
1 PATCH, EXTENDED RELEASE TRANSDERMAL ONCE
Status: DISCONTINUED | OUTPATIENT
Start: 2023-12-12 | End: 2023-12-12

## 2023-12-12 RX ORDER — ONDANSETRON 2 MG/ML
4 INJECTION INTRAMUSCULAR; INTRAVENOUS EVERY 6 HOURS PRN
Status: DISCONTINUED | OUTPATIENT
Start: 2023-12-12 | End: 2023-12-12 | Stop reason: HOSPADM

## 2023-12-12 RX ORDER — SODIUM CHLORIDE 0.9 % (FLUSH) 0.9 %
5-40 SYRINGE (ML) INJECTION EVERY 12 HOURS SCHEDULED
Status: DISCONTINUED | OUTPATIENT
Start: 2023-12-12 | End: 2023-12-12 | Stop reason: HOSPADM

## 2023-12-12 RX ORDER — PROPOFOL 10 MG/ML
INJECTION, EMULSION INTRAVENOUS CONTINUOUS PRN
Status: DISCONTINUED | OUTPATIENT
Start: 2023-12-12 | End: 2023-12-12 | Stop reason: SDUPTHER

## 2023-12-12 RX ORDER — LIDOCAINE HYDROCHLORIDE 10 MG/ML
1 INJECTION, SOLUTION EPIDURAL; INFILTRATION; INTRACAUDAL; PERINEURAL
Status: COMPLETED | OUTPATIENT
Start: 2023-12-12 | End: 2023-12-12

## 2023-12-12 RX ORDER — CALCIUM CHLORIDE 100 MG/ML
INJECTION INTRAVENOUS; INTRAVENTRICULAR PRN
Status: DISCONTINUED | OUTPATIENT
Start: 2023-12-12 | End: 2023-12-12 | Stop reason: ALTCHOICE

## 2023-12-12 RX ORDER — ACETAMINOPHEN 325 MG/1
650 TABLET ORAL EVERY 6 HOURS
Status: DISCONTINUED | OUTPATIENT
Start: 2023-12-12 | End: 2023-12-12 | Stop reason: HOSPADM

## 2023-12-12 RX ORDER — ACETAMINOPHEN 500 MG
1000 TABLET ORAL ONCE
Status: COMPLETED | OUTPATIENT
Start: 2023-12-12 | End: 2023-12-12

## 2023-12-12 RX ORDER — BUPIVACAINE HYDROCHLORIDE 7.5 MG/ML
INJECTION, SOLUTION EPIDURAL; RETROBULBAR
Status: COMPLETED | OUTPATIENT
Start: 2023-12-12 | End: 2023-12-12

## 2023-12-12 RX ORDER — DIPHENHYDRAMINE HYDROCHLORIDE 50 MG/ML
12.5 INJECTION INTRAMUSCULAR; INTRAVENOUS
Status: DISCONTINUED | OUTPATIENT
Start: 2023-12-12 | End: 2023-12-12 | Stop reason: HOSPADM

## 2023-12-12 RX ORDER — ASPIRIN 325 MG
325 TABLET ORAL DAILY
Qty: 30 TABLET | Refills: 3 | Status: SHIPPED | OUTPATIENT
Start: 2023-12-12

## 2023-12-12 RX ORDER — SODIUM CHLORIDE 0.9 % (FLUSH) 0.9 %
5-40 SYRINGE (ML) INJECTION PRN
Status: DISCONTINUED | OUTPATIENT
Start: 2023-12-12 | End: 2023-12-12 | Stop reason: SDUPTHER

## 2023-12-12 RX ORDER — ONDANSETRON 2 MG/ML
INJECTION INTRAMUSCULAR; INTRAVENOUS PRN
Status: DISCONTINUED | OUTPATIENT
Start: 2023-12-12 | End: 2023-12-12 | Stop reason: SDUPTHER

## 2023-12-12 RX ORDER — SODIUM CHLORIDE 9 MG/ML
INJECTION, SOLUTION INTRAVENOUS PRN
Status: DISCONTINUED | OUTPATIENT
Start: 2023-12-12 | End: 2023-12-12 | Stop reason: HOSPADM

## 2023-12-12 RX ORDER — MIDAZOLAM HYDROCHLORIDE 1 MG/ML
INJECTION INTRAMUSCULAR; INTRAVENOUS PRN
Status: DISCONTINUED | OUTPATIENT
Start: 2023-12-12 | End: 2023-12-12 | Stop reason: SDUPTHER

## 2023-12-12 RX ORDER — FENTANYL CITRATE 0.05 MG/ML
25 INJECTION, SOLUTION INTRAMUSCULAR; INTRAVENOUS EVERY 5 MIN PRN
Status: DISCONTINUED | OUTPATIENT
Start: 2023-12-12 | End: 2023-12-12 | Stop reason: HOSPADM

## 2023-12-12 RX ORDER — OXYCODONE HYDROCHLORIDE 5 MG/1
5 TABLET ORAL EVERY 4 HOURS PRN
Status: DISCONTINUED | OUTPATIENT
Start: 2023-12-12 | End: 2023-12-12 | Stop reason: HOSPADM

## 2023-12-12 RX ORDER — OXYCODONE HYDROCHLORIDE 10 MG/1
10 TABLET ORAL EVERY 4 HOURS PRN
Status: DISCONTINUED | OUTPATIENT
Start: 2023-12-12 | End: 2023-12-12 | Stop reason: HOSPADM

## 2023-12-12 RX ORDER — CEFDINIR 300 MG/1
300 CAPSULE ORAL 2 TIMES DAILY
Qty: 20 CAPSULE | Refills: 1 | Status: SHIPPED | OUTPATIENT
Start: 2023-12-12

## 2023-12-12 RX ORDER — TRANEXAMIC ACID 100 MG/ML
INJECTION, SOLUTION INTRAVENOUS PRN
Status: DISCONTINUED | OUTPATIENT
Start: 2023-12-12 | End: 2023-12-12 | Stop reason: SDUPTHER

## 2023-12-12 RX ORDER — DEXAMETHASONE SODIUM PHOSPHATE 10 MG/ML
10 INJECTION, SOLUTION INTRAMUSCULAR; INTRAVENOUS ONCE
Status: COMPLETED | OUTPATIENT
Start: 2023-12-12 | End: 2023-12-12

## 2023-12-12 RX ORDER — SODIUM CHLORIDE 0.9 % (FLUSH) 0.9 %
5-40 SYRINGE (ML) INJECTION EVERY 12 HOURS SCHEDULED
Status: DISCONTINUED | OUTPATIENT
Start: 2023-12-12 | End: 2023-12-12 | Stop reason: SDUPTHER

## 2023-12-12 RX ORDER — SODIUM CHLORIDE 9 MG/ML
INJECTION, SOLUTION INTRAVENOUS PRN
Status: DISCONTINUED | OUTPATIENT
Start: 2023-12-12 | End: 2023-12-12 | Stop reason: SDUPTHER

## 2023-12-12 RX ORDER — ASPIRIN 81 MG/1
81 TABLET ORAL 2 TIMES DAILY
Status: DISCONTINUED | OUTPATIENT
Start: 2023-12-12 | End: 2023-12-12 | Stop reason: HOSPADM

## 2023-12-12 RX ORDER — ONDANSETRON 2 MG/ML
4 INJECTION INTRAMUSCULAR; INTRAVENOUS
Status: DISCONTINUED | OUTPATIENT
Start: 2023-12-12 | End: 2023-12-12 | Stop reason: HOSPADM

## 2023-12-12 RX ORDER — PHENYLEPHRINE HYDROCHLORIDE 10 MG/ML
INJECTION INTRAVENOUS PRN
Status: DISCONTINUED | OUTPATIENT
Start: 2023-12-12 | End: 2023-12-12 | Stop reason: SDUPTHER

## 2023-12-12 RX ORDER — OXYCODONE HYDROCHLORIDE AND ACETAMINOPHEN 5; 325 MG/1; MG/1
1 TABLET ORAL EVERY 4 HOURS PRN
Qty: 42 TABLET | Refills: 0 | Status: SHIPPED | OUTPATIENT
Start: 2023-12-12 | End: 2023-12-19

## 2023-12-12 RX ADMIN — DEXAMETHASONE SODIUM PHOSPHATE 10 MG: 10 INJECTION, SOLUTION INTRAMUSCULAR; INTRAVENOUS at 06:24

## 2023-12-12 RX ADMIN — GABAPENTIN 800 MG: 400 CAPSULE ORAL at 06:24

## 2023-12-12 RX ADMIN — ONDANSETRON 4 MG: 2 INJECTION INTRAMUSCULAR; INTRAVENOUS at 07:33

## 2023-12-12 RX ADMIN — Medication 20 MG: at 07:53

## 2023-12-12 RX ADMIN — PHENYLEPHRINE HYDROCHLORIDE 200 MCG: 10 INJECTION INTRAVENOUS at 08:12

## 2023-12-12 RX ADMIN — SODIUM CHLORIDE, POTASSIUM CHLORIDE, SODIUM LACTATE AND CALCIUM CHLORIDE: 600; 310; 30; 20 INJECTION, SOLUTION INTRAVENOUS at 08:41

## 2023-12-12 RX ADMIN — MIDAZOLAM 2 MG: 1 INJECTION INTRAMUSCULAR; INTRAVENOUS at 07:30

## 2023-12-12 RX ADMIN — BUPIVACAINE HYDROCHLORIDE 12 MG: 7.5 INJECTION, SOLUTION EPIDURAL; RETROBULBAR at 07:35

## 2023-12-12 RX ADMIN — SODIUM CHLORIDE, POTASSIUM CHLORIDE, SODIUM LACTATE AND CALCIUM CHLORIDE: 600; 310; 30; 20 INJECTION, SOLUTION INTRAVENOUS at 06:22

## 2023-12-12 RX ADMIN — Medication 3000 MG: at 07:30

## 2023-12-12 RX ADMIN — LIDOCAINE HYDROCHLORIDE 1 ML: 10 INJECTION, SOLUTION EPIDURAL; INFILTRATION; INTRACAUDAL; PERINEURAL at 06:21

## 2023-12-12 RX ADMIN — TRANEXAMIC ACID 1000 MG: 100 INJECTION, SOLUTION INTRAVENOUS at 09:35

## 2023-12-12 RX ADMIN — TRANEXAMIC ACID 1000 MG: 100 INJECTION, SOLUTION INTRAVENOUS at 08:01

## 2023-12-12 RX ADMIN — ACETAMINOPHEN 1000 MG: 500 TABLET ORAL at 06:23

## 2023-12-12 RX ADMIN — Medication 20 MG: at 09:50

## 2023-12-12 RX ADMIN — Medication 10 MG: at 08:08

## 2023-12-12 RX ADMIN — PHENYLEPHRINE HYDROCHLORIDE 50 MCG/MIN: 10 INJECTION INTRAVENOUS at 08:16

## 2023-12-12 RX ADMIN — Medication 3000 MG: at 14:23

## 2023-12-12 RX ADMIN — PROPOFOL 125 MCG/KG/MIN: 10 INJECTION, EMULSION INTRAVENOUS at 07:48

## 2023-12-12 RX ADMIN — SODIUM CHLORIDE, POTASSIUM CHLORIDE, SODIUM LACTATE AND CALCIUM CHLORIDE: 600; 310; 30; 20 INJECTION, SOLUTION INTRAVENOUS at 10:17

## 2023-12-12 ASSESSMENT — PAIN - FUNCTIONAL ASSESSMENT: PAIN_FUNCTIONAL_ASSESSMENT: 0-10

## 2023-12-12 ASSESSMENT — PAIN SCALES - WONG BAKER: WONGBAKER_NUMERICALRESPONSE: 0

## 2023-12-12 ASSESSMENT — PAIN SCALES - GENERAL: PAINLEVEL_OUTOF10: 0

## 2023-12-12 NOTE — OP NOTE
Operative Note      Patient: Ki Lam  YOB: 1960  MRN: [id number]    Date of Procedure: 12/12/2023    Pre-Op Diagnosis Codes:     * Primary osteoarthritis of left hip [M16.12]    Post-Op Diagnosis: Same       Procedure(s):  HIP TOTAL ARTHROPLASTY MINIMALLY INVASIVE LEFT ASI    Surgeon(s): Thomas Bolden MD    Assistant:   Resident: DO Omi Crews CST    Anesthesia: Spinal    Estimated Blood Loss (mL): 164AQ    Complications: None    Specimens:   * No specimens in log *    Implants:  Implant Name Type Inv. Item Serial No.  Lot No. LRB No. Used Action   G7 FINNED 4 HOLE SHELL 58G - [id number]  G7 FINNED 4 HOLE SHELL 58G  [facility] [id number] Left 1 Implanted   LINER ACET NEUT G 40 MM LONGEVITY G7 - [id number]  LINER ACET NEUT G 40 MM LONGEVITY G7  [facility] [facility] [id number] Left 1 Implanted   STEM FEM SZ 11 L107. 5MM 133DEG HIP PPS HI OFFSET TYP 1 TAPR - [id number]  STEM FEM SZ 11 L107. 5MM 133DEG HIP PPS HI OFFSET TYP 1 TAPR  [facility] [facility] [id number] Left 1 Implanted   HEAD FEM [id number] HIP BIOLOX DELT OPT FOR G7 ACET SYS - [id number]  HEAD FEM [id number] HIP BIOLOX DELT OPT FOR G7 ACET SYS  [employer] [employer]- [id number] Left 1 Implanted   SLEEVE FEM -3MM OFFSET HIP TYP 1 TAPR FOR CERAMIC BIOLOX - [id number]  SLEEVE FEM -3MM OFFSET HIP TYP 1 TAPR FOR CERAMIC BIOLOX  [employer] [employer]- [id number] Left 1 Implanted         Drains: * No LDAs found *    Findings: Severe DJD Left hip        Detailed Description of Procedure:     Patient is a 66 y.o. male with a long standing history of DJD of the left  hip. The patient has failed all types of conservative treatments including physical therapy, NSAIDs, weight loss counseling, and intra-articular steroid injection. The patient's activities of daily living have become significantly restricted.  Having failed conservative treatment, the patient has agreed to proceed  with total hip

## 2023-12-12 NOTE — DISCHARGE INSTR - COC
Continuity of Care Form    Patient Name: Abby Antunez   :  1960  MRN:  [id number]    Admit date:  2023  Discharge date:  2023    Code Status Order: Full Code   Advance Directives:   [address] Documentation       Date/Time Healthcare Directive Type of Healthcare Directive Copy in [address] Agent's Name Healthcare Agent's Phone Number    [phone] No, patient does not have an advance directive for healthcare treatment  declined information at this time -- -- -- -- --            Admitting Physician:  Constantine Askew MD  PCP: Elester Najjar, DO    Discharging Nurse: Juan J Riddle Unit/Room#: [facility] Unit Phone Number: [phone]    Emergency Contact:   Extended Emergency Contact Information  Primary Emergency Contact: Eliana Hopson  Address: [address] [place] [address], [address] [place] [address] of [address] [address] Phone: [phone]  Work Phone: [phone]  Mobile Phone: [phone]  Relation: Spouse  Hearing or visual needs: None  Other needs: None  Preferred language: English   needed?  No    Past Surgical History:  Past Surgical History:   Procedure Laterality Date    CARDIAC PROCEDURE N/A 10/30/2023    Left heart cath / coronary angiography performed by Gama Medrano MD at [address] [address] CATH  GREATER THAN 5 YRS AGO    CARPAL TUNNEL RELEASE Right     COLONOSCOPY      COLONOSCOPY N/A 2021    COLONOSCOPY POLYPECTOMY COLD BIOPSY performed by Joel Mancera MD at [address] [place] [address] 2016    lt lateral epicondylectomy    EYE SURGERY Right     JOINT REPLACEMENT Bilateral     KNEES    KNEE ARTHROSCOPY Bilateral     2-3x each    UPPP UVULOPALATOPHARYGOPLASTY      WISDOM TOOTH EXTRACTION         Immunization History:   Immunization History   Administered Date(s) Administered    COVID-19, PFIZER Bivalent, DO NOT Dilute, (age

## 2023-12-12 NOTE — DISCHARGE INSTRUCTIONS
Quinn Reed DISCHARGE INSTRUCTIONS  Caring for yourself after joint replacement surgery (Total Hip and Total Knee Replacement)    Activity and Therapy  Receive physical therapy three times per week. (Pain medication one hour prior to therapy)   Perform PT exercises on own when not receiving home or outpatient PT. Ideally exercises should be at least two times a day. Increase level of activity and ambulation each day. Perform deep breathing exercises daily. Patient provides self-care when possible. Work on Range of motion for Total knee patients. No pillow under the knee for Total knee patients. Elevate the surgical leg when seated. No driving until cleared by surgeon      Diet:  Increase oral intake of fruits, fiber and water to prevent constipation. Drink fluids frequently and take stool softeners to aid in bowel motility. Increase protein intake/reduce high-sugar intake to help promote healing and prevent infection. Incision Care:  Keep Aquacel or other dressing intact until seen and removed by surgeon, unless saturated, in which case, call surgeon and request instructions. If dressing falls off, call surgeon. [facility] on in the am and off in the pm to reduce swelling. Ice affected area four times a day, for twenty minutes. Pain Medications and Anticoagulant  You have been place on an anticoagulant to prevent blood clots. Take this medication exactly as prescribed. Be alert for signs of bleeding. Take care not to injure yourself. You have been provided pain medicine to control your pain. Do not take more narcotics than prescribed. You may begin weaning from narcotics as your pain level improves by decreasing the amount or frequency of the narcotics. You may also take plain acetaminophen as an alternate to the narcotics. Never exceed the recommended dosage. Ice, rest and elevating the surgical limb also help with pain control.       When to call the Surgeon:  Increased redness, warmth, drainage,

## 2023-12-12 NOTE — INTERVAL H&P NOTE
Update History & Physical    The patient's History and Physical of November 28, 23 was reviewed with the patient and I examined the patient. There was no change. The surgical site was confirmed by the patient and me. Pt undergoing for HIP TOTAL ARTHROPLASTY MINIMALLY INVASIVE LEFT ASI per Dr. Annelle Sacks. Pt denies fever/chills, chest pain or SOB  Pt Npo since the past midnight, pt took his BP medication today with sip of water  Pt denies hx of MRSA infection   Pt denies hx of blood clots  Pt denies personal and family history of complications with anesthesia  Physical exam remains unchanged including cardiac and pulmonary assessment  See nursing flow sheet for vital sings    Lab Results   Component Value Date    WBC 4.6 11/28/2023    HGB 15.0 11/28/2023    HCT 44.6 11/28/2023    MCV 96.4 11/28/2023     11/28/2023     Lab Results   Component Value Date/Time     11/28/2023 01:17 PM    K 4.0 11/28/2023 01:17 PM     11/28/2023 01:17 PM    CO2 28 11/28/2023 01:17 PM    BUN 14 11/28/2023 01:17 PM    CREATININE 0.9 11/28/2023 01:17 PM    GLUCOSE 158 11/28/2023 01:17 PM    CALCIUM 9.5 11/28/2023 01:17 PM    LABGLOM >60 11/28/2023 01:17 PM      Lab Results   Component Value Date/Time    COLORU Yellow 11/28/2023 01:17 PM    NITRU NEGATIVE 11/28/2023 01:17 PM    GLUCOSEU NEGATIVE 11/28/2023 01:17 PM    KETUA NEGATIVE 11/28/2023 01:17 PM    UROBILINOGEN Normal 11/28/2023 01:17 PM    BILIRUBINUR NEGATIVE 11/28/2023 01:17 PM    BILIRUBINUR negative 03/29/2019 09:58 AM       Plan: The risks, benefits, expected outcome, and alternative to the recommended procedure have been discussed with the patient. Patient understands and wants to proceed with the procedure.      Electronically signed by GILDARDO Gimenez CNP on 12/12/2023 at 5:37 AM

## 2023-12-12 NOTE — CARE COORDINATION
Case Management Assessment  Initial Evaluation    Date/Time of Evaluation: 12/12/2023 11:36 AM  Assessment Completed by: Brett Hanson RN    If patient is discharged prior to next notation, then this note serves as note for discharge by case management. Patient Name: Renetta Baca                   YOB: 1960  Diagnosis: Primary osteoarthritis of left hip [M16.12]                   Date / Time: 12/12/2023  5:26 AM    Patient Admission Status: Outpatient in a bed   Readmission Risk (Low < 19, Mod (19-27), High > 27): No data recorded  Current PCP: Julia Olivas, DO  PCP verified by CM? Yes    Chart Reviewed: Yes      History Provided by: Patient  Patient Orientation: Alert and Oriented    Patient Cognition: Alert    Hospitalization in the last 30 days (Readmission):  No    If yes, Readmission Assessment in  Navigator will be completed. Advance Directives:      Code Status: Full Code   Patient's Primary Decision Maker is: Legal Next of Kin      Discharge Planning:    Patient lives with: Spouse/Significant Other Type of Home: House  Primary Care Giver: Self  Patient Support Systems include: Spouse/Significant Other, Family Members   Current Financial resources: None  Current community resources: ECF/Home Care  Current services prior to admission: None            Current DME:              Type of Home Care services:  OT, PT    ADLS  Prior functional level: Independent in ADLs/IADLs  Current functional level: Independent in ADLs/IADLs    PT AM-PAC:   /24  OT AM-PAC:   /24    Family can provide assistance at DC: Yes  Would you like Case Management to discuss the discharge plan with any other family members/significant others, and if so, who?  Yes (Spouise; Maude Loomis)  Plans to Return to Present Housing: Yes  Other Identified Issues/Barriers to RETURNING to current housing: NA  Potential Assistance needed at discharge: [facility] Home Care            Potential DME:
Continuity of Care Form    Patient Name: Lazaro Abraham   :  1960  MRN:  [id number]    Admit date:  2023  Discharge date:  2023    Code Status Order: Full Code   Advance Directives:   [address] Documentation       Date/Time Healthcare Directive Type of Healthcare Directive Copy in [address] Agent's Name Healthcare Agent's Phone Number    [phone] No, patient does not have an advance directive for healthcare treatment  declined information at this time -- -- -- -- --            Admitting Physician:  Jake Leyva MD  PCP: Yulisa Ovalle DO    Discharging Nurse: Jourdan Armando Unit/Room#: [facility] Unit Phone Number: [phone]    Emergency Contact:   Extended Emergency Contact Information  Primary Emergency Contact: Eliana Hopson  Address: [address] [place] [address], [address] [place] [address] [address] [place] of [address] [address] Phone: [phone]  Work Phone: [phone]  Mobile Phone: [phone]  Relation: Spouse  Hearing or visual needs: None  Other needs: None  Preferred language: English   needed?  No    Past Surgical History:  Past Surgical History:   Procedure Laterality Date    CARDIAC PROCEDURE N/A 10/30/2023    Left heart cath / coronary angiography performed by Darshan Plunkett MD at [address] [address] CATH  GREATER THAN 5 YRS AGO    CARPAL TUNNEL RELEASE Right     COLONOSCOPY      COLONOSCOPY N/A 2021    COLONOSCOPY POLYPECTOMY COLD BIOPSY performed by Deejay Cavazos MD at [address] [place] [address] Left 2016    lt lateral epicondylectomy    EYE SURGERY Right     JOINT REPLACEMENT Bilateral     KNEES    KNEE ARTHROSCOPY Bilateral     2-3x each    UPPP UVULOPALATOPHARYGOPLASTY      WISDOM TOOTH EXTRACTION         Immunization History:   Immunization History   Administered Date(s) Administered    COVID-19, PFIZER Bivalent, DO NOT Dilute, (age
DISCHARGE PLANNING NOTE:    NIESHA and DC orders faxed to [facility]; Enrrique Schmidt confirms acceptance. This writer contacted [facility] and verified receipt of walker order and that it will be delivered to the patient's room this afternoon.     Electronically signed by Mena Guzman RN on 12/12/2023 at 1:09 PM
Subscriber ID: [id number] Tyron Nuno. Rel. to [employer] Jennifer        CSN: [id number]     [id number]        CSN                                    Req/Control # [Problem retrieving Specimen ID]                                   Order Date:  Dec 12, 2023  [id number]                                          Patient Information      Name:  Rhonda Wu  :  1960  Age:  66 y.o. Address:  [address] [place] [address]   Zip:  [address]  PCP: Yael Haley DO Sex:  M  SSN: [id number]  Home Phone: [phone]  Work Phone:    Patient MRN:  [id number]    Alt Patient ID:  [id number]  PCP Phone: [phone]       Authorizing Provider Information       AUTHORIZING PROVIDER: Colten Washington MD  Physician ID: [id number]  NPI:  [id number]  Site:   Address: [address] Zip: [place]  Phone: [phone]  Fax: [phone]             EQUIPMENT ORDERED  DME Order for John Lozano (ORD   #:   [id number]) Priority  Routine Class  Hospital Performed        Associated Diagnosis:          Comments: You must complete the order parameters below and add the medical necessity documentation for this DME in a separate note. Folding Walker with Wheels     Current patient weight: Weight - Scale: (!) 140.6 kg (310 lb)  Current patient height: Height: 172.7 cm (5' 8\")  Diagnosis: Unsteady gait, weakness.    Duration: Purchase            Scheduling Instructions:                                 Specimen Source             Collection Date    Collection Time    Order Status  Standing Expected Date                 Electronically Signed By  Colten Washington MD  NPI:  [id number] Date  Dec 12, 2023  10:53 AM               Responsible Party Noah Hernandez     Guar-ActID   Relationship Account Type Home Phone   Rhonda Wu [phone] [address] [place] [address],  [address] Self P/F [phone]   Employer   Work Phone   [address]

## 2023-12-12 NOTE — ANESTHESIA PROCEDURE NOTES
Spinal Block    Patient location during procedure: OR  End time: 12/12/2023 7:40 AM  Reason for block: primary anesthetic  Staffing  Performed: resident/CRNA   Resident/CRNA: Kiah Simons., APRN - CRNA  Performed by: Kiah Simon, APRN - CRNA  Authorized by: Kade Roy MD    Spinal Block  Patient position: sitting  Prep: Betadine  Patient monitoring: cardiac monitor  Approach: midline  Location: L4/L5  Provider prep: mask  Local infiltration: lidocaine  Needle  Needle type: Quincke   Needle gauge: 25 G  Needle length: 5 in  Assessment  Sensory level: T6  Swirl obtained: Yes  CSF: clear  Attempts: 1  Hemodynamics: stable  Preanesthetic Checklist  Completed: patient identified, IV checked, site marked, risks and benefits discussed, surgical/procedural consents, equipment checked, pre-op evaluation, timeout performed, anesthesia consent given, oxygen available, monitors applied/VS acknowledged, fire risk safety assessment completed and verbalized and blood product R/B/A discussed and consented

## 2023-12-12 NOTE — PROGRESS NOTES
CLINICAL PHARMACY NOTE: MEDS TO BEDS    Total # of Prescriptions Filled: 3   The following medications were delivered to the patient:  Aspirin 325mg  Cefdinir 300mg  Oxycodone-acetaminophen 5-325mg    Additional Documentation:Patient is Eligible to Utilize Meds To Beds for Their Discharge Medications 12/12/23
Writer went through discharge paperwork with pt and pt family. .. iv removed. Antibiotic given, neri hose given to patient, IS given to pt and explained importance of this intervention. . pt has all his belongings.
dressing tasks  Comment: with UE supported on RW    Transfers  Transfers  Sit to stand: Contact guard assistance  Stand to sit: Contact guard assistance  Transfer Comments: verbal cues for hand placement on RW with Good return    Functional Mobility  Functional - Mobility Device: Rolling Walker  Activity: Other (in room/hallway to simulate household/community distances)  Assist Level: Contact guard assistance  Functional Mobility Comments: cues for BLE sequencing and use of RW, Good carryover/demonstration    Assessment  Assessment  Performance deficits / Impairments: Decreased functional mobility , Decreased ADL status, Decreased endurance, Decreased balance  Treatment Diagnosis: impaired self care status  Prognosis: Good  Decision Making: Low Complexity  Discharge Recommendations: Patient would benefit from continued therapy after discharge    Activity Tolerance  Activity Tolerance: Patient Tolerated treatment well  Activity Tolerance Comments: pt denies increase in pain during mobility    Safety Devices  Type of Devices: Left in chair, Call light within reach (family in room)    Patient Education  Patient Education  Education Given To: Patient, Family  Education Provided: Role of Therapy, Plan of Care, Precautions, ADL Adaptive Strategies, Transfer Training  Education Method: Verbal, Demonstration  Barriers to Learning: None  Education Outcome: Verbalized understanding, Continued education needed      Functional Outcome Measures  AM-PAC Daily Activity - Inpatient   How much help is needed for putting on and taking off regular lower body clothing?: A Little  How much help is needed for bathing (which includes washing, rinsing, drying)?: A Little  How much help is needed for toileting (which includes using toilet, bedpan, or urinal)?: A Little  How much help is needed for putting on and taking off regular upper body clothing?: A Little  How much help is needed for taking care of personal grooming?: A Little  How
left elevated as pt has craftmatic bed at home)  Transfers  Sit to Stand: Contact guard assistance  Stand to Sit: Contact guard assistance  Comment: with bariatric RW; needs intermittent verbal cues for hand placement and L foot placement  Ambulation  WB Status: FWB L LE  Ambulation  Surface: Level tile  Device: Rolling Walker (bariatric RW)  Assistance: Contact guard assistance (CGA progressing to SBA during session)  Quality of Gait: no knee buckling noted  Gait Deviations: Decreased step length; Slow Jacquie  Distance: 40 ft  Comments: no LOB, mild lightheadedness. Pt is educated on working into reciprocal pattern as able. More Ambulation?: No  Stairs/Curb  Stairs?: Yes  Stairs  # Steps : 3  Stairs Height: 6\"  Rails: Right ascending;Left ascending (pt navigates 1 portal 6 inch step at EOB 3x; R sided hand rail 2x and L sided handrail 1x)  Device: No Device  Assistance: Contact guard assistance  Comment: Pt reports he has to get into trunk with running board when leaving hospital. Pt is educated to step up backwards then sit down on backseat. Pt navigated 6 inch step backwards 2x CGAx1 to similate truck transfer as he will have to perform into personal truck. No difficulty or LOB noted.  Pt steps up with R LE first. Denies concerns or questions after 2 attempts     Balance  Posture: Good  Sitting - Static: Good  Sitting - Dynamic: Good  Standing - Static: Fair;+  Standing - Dynamic: Fair;+  Comments: standing with bariatric RW           OutComes Score                                                  AM-PAC - Mobility    AM-PAC Basic Mobility - Inpatient   How much help is needed turning from your back to your side while in a flat bed without using bedrails?: None  How much help is needed moving from lying on your back to sitting on the side of a flat bed without using bedrails?: None  How much help is needed moving to and from a bed to a chair?: A Little  How much help is needed standing up from a chair using your

## 2023-12-14 ENCOUNTER — TELEPHONE (OUTPATIENT)
Dept: ORTHOPEDIC SURGERY | Age: 63
End: 2023-12-14

## 2023-12-26 ENCOUNTER — HOSPITAL ENCOUNTER (EMERGENCY)
Age: 63
Discharge: HOME | End: 2023-12-26
Attending: EMERGENCY MEDICINE
Payer: COMMERCIAL

## 2023-12-26 ENCOUNTER — NURSE TRIAGE (OUTPATIENT)
Dept: OTHER | Facility: CLINIC | Age: 63
End: 2023-12-26

## 2023-12-26 VITALS
WEIGHT: 300 LBS | BODY MASS INDEX: 44.43 KG/M2 | HEIGHT: 69 IN | SYSTOLIC BLOOD PRESSURE: 126 MMHG | OXYGEN SATURATION: 99 % | DIASTOLIC BLOOD PRESSURE: 105 MMHG | RESPIRATION RATE: 18 BRPM | HEART RATE: 96 BPM | TEMPERATURE: 97.8 F

## 2023-12-26 DIAGNOSIS — T78.40XA ALLERGIC REACTION, INITIAL ENCOUNTER: Primary | ICD-10-CM

## 2023-12-26 PROCEDURE — 6370000000 HC RX 637 (ALT 250 FOR IP): Performed by: EMERGENCY MEDICINE

## 2023-12-26 PROCEDURE — 99283 EMERGENCY DEPT VISIT LOW MDM: CPT

## 2023-12-26 RX ORDER — DEXAMETHASONE 6 MG/1
6 TABLET ORAL ONCE
Status: COMPLETED | OUTPATIENT
Start: 2023-12-26 | End: 2023-12-26

## 2023-12-26 RX ORDER — EPINEPHRINE 0.3 MG/.3ML
0.3 INJECTION SUBCUTANEOUS
Qty: 0.3 ML | Refills: 1 | Status: SHIPPED | OUTPATIENT
Start: 2023-12-26 | End: 2023-12-26

## 2023-12-26 RX ORDER — FAMOTIDINE 20 MG/1
20 TABLET, FILM COATED ORAL ONCE
Status: COMPLETED | OUTPATIENT
Start: 2023-12-26 | End: 2023-12-26

## 2023-12-26 RX ORDER — CETIRIZINE HYDROCHLORIDE 10 MG/1
10 TABLET ORAL ONCE
Status: COMPLETED | OUTPATIENT
Start: 2023-12-26 | End: 2023-12-26

## 2023-12-26 RX ORDER — FAMOTIDINE 20 MG/1
20 TABLET, FILM COATED ORAL 2 TIMES DAILY
Qty: 10 TABLET | Refills: 0 | Status: SHIPPED | OUTPATIENT
Start: 2023-12-26 | End: 2023-12-31

## 2023-12-26 RX ADMIN — DEXAMETHASONE 6 MG: 6 TABLET ORAL at 08:56

## 2023-12-26 RX ADMIN — FAMOTIDINE 20 MG: 20 TABLET ORAL at 08:56

## 2023-12-26 RX ADMIN — CETIRIZINE HYDROCHLORIDE 10 MG: 10 TABLET, FILM COATED ORAL at 08:56

## 2023-12-26 NOTE — ED PROVIDER NOTES
FOLLOW UP THE PATIENT:  Bret Limon DO  [address]    [address]  [address]    Schedule an appointment as soon as possible for a visit in 1 day        MD Lena New MD  12/26/23 [phone]

## 2023-12-26 NOTE — TELEPHONE ENCOUNTER
Location of patient: [place]    Subjective: Caller states \"My  started having noticing his tongue becoming swollen around 11pm.\"     Pts had some tongue swelling yesterday evening as well. Wife believes that it was the banana pudding he was eating. Ate banana pudding at 10pm this evening. Pt does take ACE Inhibitors     Current Symptoms:   Tongue swelling: able to talk but does have difficulty   Lip swelling: more so the bottom lip    Denies:  SOB/difficulty breathing   Throat swelling     Onset: 2 hour ago; unchanged    Pain Severity: none reported     Temperature: none reported     What has been tried:   Benadryl     Recommended disposition: Call  Now    Care advice provided, patient verbalizes understanding; denies any other questions or concerns; instructed to call back for any new or worsening symptoms. Patient/caller agrees to calling 911    Attention Provider: Thank you for allowing me to participate in the care of your patient. The patient was connected to triage in response to symptoms provided. Please do not respond through this encounter as the response is not directed to a shared pool.     Reason for Disposition   Taking an ACE Inhibitor medicine (e.g., benazepril / LOTENSIN, captopril / CAPOTEN, enalapril / Niko Schilder, lisinopril / Aga Bridge)    Protocols used: Tongue Swelling-ADULT-AH

## 2023-12-27 ENCOUNTER — OFFICE VISIT (OUTPATIENT)
Dept: ORTHOPEDIC SURGERY | Age: 63
End: 2023-12-27

## 2023-12-27 VITALS — RESPIRATION RATE: 16 BRPM | HEIGHT: 69 IN | BODY MASS INDEX: 44.43 KG/M2 | WEIGHT: 300 LBS

## 2023-12-27 DIAGNOSIS — Z96.642 HISTORY OF TOTAL LEFT HIP ARTHROPLASTY: ICD-10-CM

## 2023-12-27 DIAGNOSIS — M25.552 LEFT HIP PAIN: Primary | ICD-10-CM

## 2023-12-27 PROCEDURE — 99024 POSTOP FOLLOW-UP VISIT: CPT | Performed by: PHYSICIAN ASSISTANT

## 2023-12-27 NOTE — PROGRESS NOTES
[address] [facility], [address] [facility] 1202 [facility], [address] [place] [address], [address] [place] [address] [address]           Dept Phone: [phone]           Dept Fax:  [phone] [address]          Dept Phone: [phone]           Dept Fax:  [phone]      Chief Compliant:  Chief Complaint   Patient presents with    Hip Pain     Left hip        History of Present Illness:  Patient returns today 2 weeks status post left ANGELA-ASI. Patient has no major complaints. Review of Systems   Constitutional: Negative for fever, chills, sweats, recent injury, recent illness  Neurological: Negative for Headaches, numbness, or weakness. Integumentary: Negative for rash, itching, ecchymosis, or wounds. Musculoskeletal: Positive for Hip Pain (Left hip)       Physical Exam:  Constitutional: Patient is oriented to person, place, and time. Patient appears well-developed and well nourished. Musculoskeletal: Normal gait. Expected degree of meralgia parasthetica. Expected mild pain with gentle ROM of hip. Calves negative. Alvaro's negative. Neurovascular Intact. Leg lengths equal  Neurological: Patient is alert and oriented to person, place, and time. Normal strenght. No sensory deficit. Skin: Skin is warm and dry. ASI incision without redness or drainage. Nursing note and vitals reviewed. Labs and Imaging:     XR taken today:  No results found. X-rays taken in clinic today and preliminarily reviewed by me from 12/27/2023:  AP pelvis lateral view of the left hip demonstrates patient's response left total of arthroplasty. Components appear to be in appropriate position without evidence of hardware complication periprosthetic fracture or prosthetic loosening.   Right hip with fairly advanced DJD with presence of cam and pincer osteophytes present     Orders Placed This

## 2023-12-29 DIAGNOSIS — Z96.642 HISTORY OF TOTAL LEFT HIP ARTHROPLASTY: Primary | ICD-10-CM

## 2023-12-29 NOTE — TELEPHONE ENCOUNTER
The patient called in today requesting Percocet. He was told to call today to request the medication.    F 12/12/23 #42

## 2023-12-30 ENCOUNTER — HOSPITAL ENCOUNTER (EMERGENCY)
Age: 63
Discharge: HOME | End: 2023-12-30
Attending: EMERGENCY MEDICINE
Payer: COMMERCIAL

## 2023-12-30 VITALS
BODY MASS INDEX: 42.95 KG/M2 | OXYGEN SATURATION: 95 % | WEIGHT: 300 LBS | HEART RATE: 81 BPM | RESPIRATION RATE: 15 BRPM | SYSTOLIC BLOOD PRESSURE: 198 MMHG | TEMPERATURE: 97 F | HEIGHT: 70 IN | DIASTOLIC BLOOD PRESSURE: 99 MMHG

## 2023-12-30 DIAGNOSIS — R21 RASH AND OTHER NONSPECIFIC SKIN ERUPTION: Primary | ICD-10-CM

## 2023-12-30 PROCEDURE — 6370000000 HC RX 637 (ALT 250 FOR IP): Performed by: EMERGENCY MEDICINE

## 2023-12-30 PROCEDURE — 99283 EMERGENCY DEPT VISIT LOW MDM: CPT

## 2023-12-30 RX ORDER — DIPHENHYDRAMINE HCL 25 MG
25 TABLET ORAL ONCE
Status: COMPLETED | OUTPATIENT
Start: 2023-12-30 | End: 2023-12-30

## 2023-12-30 RX ORDER — PREDNISONE 20 MG/1
40 TABLET ORAL DAILY
Qty: 10 TABLET | Refills: 0 | Status: SHIPPED | OUTPATIENT
Start: 2023-12-30 | End: 2024-01-04

## 2023-12-30 RX ADMIN — DIPHENHYDRAMINE HYDROCHLORIDE 25 MG: 25 TABLET ORAL at 10:16

## 2023-12-30 RX ADMIN — PREDNISONE 50 MG: 20 TABLET ORAL at 10:16

## 2023-12-30 ASSESSMENT — LIFESTYLE VARIABLES
HOW OFTEN DO YOU HAVE A DRINK CONTAINING ALCOHOL: NEVER
HOW MANY STANDARD DRINKS CONTAINING ALCOHOL DO YOU HAVE ON A TYPICAL DAY: PATIENT DOES NOT DRINK

## 2023-12-30 ASSESSMENT — PAIN - FUNCTIONAL ASSESSMENT: PAIN_FUNCTIONAL_ASSESSMENT: 0-10

## 2023-12-30 ASSESSMENT — PAIN SCALES - GENERAL: PAINLEVEL_OUTOF10: 3

## 2023-12-30 NOTE — ED NOTES
Discharge instructions reviewed with patient with all information noted by provider covered. Patient verbalized understanding of all instructions reviewed, gathered personal items, and transferred from ER to lobby without incident.

## 2023-12-30 NOTE — ED PROVIDER NOTES
EMERGENCY DEPARTMENT ENCOUNTER    Pt Name: Leon Riggins  MRN: [id number]  [address] 1960  Date of evaluation: 12/30/23  CHIEF COMPLAINT       Chief Complaint   Patient presents with    Rash     HISTORY OF PRESENT ILLNESS   81-year-old male presents for reported bumps on his head. Patient states he was recently seen for swelling of his face and lip for concern for allergic reaction. He reports that has gotten better, he reports that yesterday he noted that he had a couple bumps on the back of his head, he states that he felt them again this morning and was concerned that they were bigger and presented for evaluation. He also reports a bump on his upper left buttock as well. He denies any pain, he states he does feel tight and swollen. He denies any redness that he has noted denies any fevers or chills. He denies any new exposures recently. He denies any difficulty breathing    The history is provided by the patient. REVIEW OF SYSTEMS     Review of Systems   Constitutional:  Negative for chills and fever. HENT:  Negative for congestion and ear pain. Eyes:  Negative for pain. Respiratory:  Negative for shortness of breath. Cardiovascular:  Negative for chest pain, palpitations and leg swelling. Gastrointestinal:  Negative for abdominal pain. Genitourinary:  Negative for dysuria and flank pain. Musculoskeletal:  Negative for back pain. Skin:  Positive for rash. Negative for color change. Neurological:  Negative for numbness and headaches. Psychiatric/Behavioral:  Negative for confusion. All other systems reviewed and are negative.     PASTMEDICAL HISTORY     Past Medical History:   Diagnosis Date    Abnormal EKG     BY HX    Arthritis     Bell palsy     Cardiomyopathy ([address])     Chest pain     COVID-19 vaccine administered 1-,12-    Pfizer and has received Booster of Pfizer     History of cardiac cath 10/2023    History of stress test 09/2023    Hyperlipidemia ultrasound) are read by the radiologist, see reports below, unless otherwise noted in MDM or here.  Reports below are reviewed by myself.  No orders to display       LABS: Lab orders shown below, the results are reviewed by myself, and all abnormals are listed below.  Labs Reviewed - No data to display    Vitals Reviewed:    Vitals:    12/30/23 0935   BP: (!) 198/99   Pulse: 81   Resp: 15   Temp: 97 °F (36.1 °C)   TempSrc: Oral   SpO2: 95%   Weight: 136.1 kg (300 lb)   Height: 1.77 m (5' 9.69\")     MEDICATIONS GIVEN TO PATIENT THIS ENCOUNTER:  Orders Placed This Encounter   Medications    predniSONE (DELTASONE) tablet 50 mg    diphenhydrAMINE (BENADRYL) tablet 25 mg    predniSONE (DELTASONE) 20 MG tablet     Sig: Take 2 tablets by mouth daily for 5 days     Dispense:  10 tablet     Refill:  0     DISCHARGE PRESCRIPTIONS:  Discharge Medication List as of 12/30/2023 11:09 AM        START taking these medications    Details   predniSONE (DELTASONE) 20 MG tablet Take 2 tablets by mouth daily for 5 days, Disp-10 tablet, R-0Normal           PHYSICIAN CONSULTS ORDERED THIS ENCOUNTER:  None  FINAL IMPRESSION      1. Rash and other nonspecific skin eruption          DISPOSITION/PLAN   DISPOSITION Decision To Discharge 12/30/2023 11:08:26 AM      OUTPATIENT FOLLOW UP THE PATIENT:  Massimo Lam DO  [address] [place] [address].   [facility] 2, Floor 2  [place] [address]  [phone]    Schedule an appointment as soon as possible for a visit       [facility]  [address] [address]  [phone]    As needed, If symptoms worsen    Cliff Hawk MD  [address] [place] [address]  [phone]    Schedule an appointment as soon as possible for a visit         DO Christen Colvin Nathan R, DO  12/30/23 2019

## 2024-01-02 RX ORDER — OXYCODONE HYDROCHLORIDE AND ACETAMINOPHEN 5; 325 MG/1; MG/1
1 TABLET ORAL EVERY 6 HOURS PRN
Qty: 28 TABLET | Refills: 0 | Status: SHIPPED | OUTPATIENT
Start: 2024-01-02 | End: 2024-01-09

## 2024-01-03 NOTE — TELEPHONE ENCOUNTER
Pt calls in requesting update on refil. Pt advised that refill was confirmed by [facility] pharmacy on 1/2/24. Pt advised to contact pharmacy to know when medication is available for . Pt voices understanding.

## 2024-01-06 ENCOUNTER — NURSE TRIAGE (OUTPATIENT)
Dept: OTHER | Facility: CLINIC | Age: 64
End: 2024-01-06

## 2024-01-06 ENCOUNTER — HOSPITAL ENCOUNTER (EMERGENCY)
Age: 64
Discharge: HOME | End: 2024-01-07
Attending: EMERGENCY MEDICINE
Payer: COMMERCIAL

## 2024-01-06 VITALS
OXYGEN SATURATION: 94 % | DIASTOLIC BLOOD PRESSURE: 90 MMHG | WEIGHT: 300 LBS | HEART RATE: 84 BPM | HEIGHT: 69 IN | RESPIRATION RATE: 18 BRPM | TEMPERATURE: 98.5 F | SYSTOLIC BLOOD PRESSURE: 158 MMHG | BODY MASS INDEX: 44.43 KG/M2

## 2024-01-06 DIAGNOSIS — Z48.89 ENCOUNTER FOR POST SURGICAL WOUND CHECK: Primary | ICD-10-CM

## 2024-01-06 PROCEDURE — 99283 EMERGENCY DEPT VISIT LOW MDM: CPT

## 2024-01-06 PROCEDURE — 6370000000 HC RX 637 (ALT 250 FOR IP): Performed by: EMERGENCY MEDICINE

## 2024-01-06 RX ADMIN — PREDNISONE 50 MG: 20 TABLET ORAL at 23:12

## 2024-01-06 ASSESSMENT — ENCOUNTER SYMPTOMS
SHORTNESS OF BREATH: 0
BACK PAIN: 0
EYE PAIN: 0
ABDOMINAL PAIN: 0
COLOR CHANGE: 0

## 2024-01-06 ASSESSMENT — PAIN - FUNCTIONAL ASSESSMENT: PAIN_FUNCTIONAL_ASSESSMENT: NONE - DENIES PAIN

## 2024-01-07 RX ORDER — PREDNISONE 20 MG/1
40 TABLET ORAL DAILY
Qty: 10 TABLET | Refills: 0 | Status: SHIPPED | OUTPATIENT
Start: 2024-01-07 | End: 2024-01-12

## 2024-01-07 NOTE — ED PROVIDER NOTES
EMERGENCY DEPARTMENT ENCOUNTER    Pt Name: Norm Hopson  MRN: [id number]  Birthdate 1960  Date of evaluation: 1/6/24  CHIEF COMPLAINT       Chief Complaint   Patient presents with    Post-op Problem    Urticaria     HISTORY OF PRESENT ILLNESS   63-year-old male presents for postop wound check.  Patient had a total left hip replacement back in December, reports that he noted a gap in his incision today and presented for evaluation.  He denies any drainage that he has noted denies any significant pain he denies any associated fevers or chills.  He also reports that he was seen for a bump on his head reports it was getting better with steroids but he has been off of his steroids for the last day or so and since being off he has noted that the bump came back on his head states it is an itchy.    The history is provided by the patient.           REVIEW OF SYSTEMS     Review of Systems   Constitutional:  Negative for chills and fever.   HENT:  Negative for congestion and ear pain.    Eyes:  Negative for pain.   Respiratory:  Negative for shortness of breath.    Cardiovascular:  Negative for chest pain, palpitations and leg swelling.   Gastrointestinal:  Negative for abdominal pain.   Genitourinary:  Negative for dysuria and flank pain.   Musculoskeletal:  Negative for back pain.   Skin:  Positive for wound. Negative for color change.   Neurological:  Negative for numbness and headaches.   Psychiatric/Behavioral:  Negative for confusion.    All other systems reviewed and are negative.    PASTMEDICAL HISTORY     Past Medical History:   Diagnosis Date    Abnormal EKG     BY HX    Arthritis     Bell palsy     Cardiomyopathy (HCC)     Chest pain     COVID-19 vaccine administered 1-,12-    Pfizer and has received Booster of Pfizer     History of cardiac cath 10/2023    History of stress test 09/2023    Hyperlipidemia     Hypertension     Morbid obesity (HCC)     Sleep apnea     HAD SURGERY     Past Problem

## 2024-01-07 NOTE — ED NOTES
Incision on left hip redress and new steri strips applied. Patient denies any pain. No bleeding noted at this time.

## 2024-01-07 NOTE — ED TRIAGE NOTES
Mode of arrival (squad #, walk in, police, etc) : no        Chief complaint(s): no        Arrival Note (brief scenario, treatment PTA, etc).:  pt states he had a total hip here 5 days ago, states the top of incision \"pooped open\".  States it is small, the size of a BB.  Dr. Ramirez did the surgery.  Pt also states he is breaking out in hives.  Ha a large lump on posterior head.          C= \"Have you ever felt that you should Cut down on your drinking?\"  No  A= \"Have people Annoyed you by criticizing your drinking?\"  No  G= \"Have you ever felt bad or Guilty about your drinking?\"  No  E= \"Have you ever had a drink as an Eye-opener first thing in the morning to steady your nerves or to help a hangover?\"  No      Deferred []      Reason for deferring: N/A    *If yes to two or more: probable alcohol abuse.*

## 2024-01-08 ENCOUNTER — CARE COORDINATION (OUTPATIENT)
Dept: OTHER | Facility: CLINIC | Age: 64
End: 2024-01-08

## 2024-01-08 ENCOUNTER — TELEPHONE (OUTPATIENT)
Dept: ORTHOPEDIC SURGERY | Age: 64
End: 2024-01-08

## 2024-01-08 NOTE — TELEPHONE ENCOUNTER
This patient had a left ANGELA 12/12/23.  He was seen at the ED this past Saturday for an open incision.  It was covered with steri strips and he was told it did not look infected.  He currently has an appointment on 1/29/24, but was wondering if he should be seen sooner.    Please advise.

## 2024-01-08 NOTE — CARE COORDINATION
Chart reviewed. No care mtg needs identified. ACM will not follow    Lakisha VICTORIA, RN- [facility]  Associate Care Manager  [phone]  [email or web address]

## 2024-01-09 ENCOUNTER — OFFICE VISIT (OUTPATIENT)
Dept: ORTHOPEDIC SURGERY | Age: 64
End: 2024-01-09

## 2024-01-09 VITALS — BODY MASS INDEX: 44.43 KG/M2 | HEIGHT: 69 IN | WEIGHT: 300 LBS | RESPIRATION RATE: 16 BRPM

## 2024-01-09 DIAGNOSIS — Z96.642 HISTORY OF TOTAL LEFT HIP ARTHROPLASTY: Primary | ICD-10-CM

## 2024-01-09 DIAGNOSIS — Z51.89 VISIT FOR WOUND CHECK: ICD-10-CM

## 2024-01-09 PROCEDURE — 99024 POSTOP FOLLOW-UP VISIT: CPT | Performed by: PHYSICIAN ASSISTANT

## 2024-01-09 NOTE — PROGRESS NOTES
[facility]                   Rosendo Elizabeth PA-C            [address], [address]               [place], [place] [address]           Dept Phone: [phone]           Dept Fax:  [phone] [address] [facility]                       [address]           [place], [place] [address]          Dept Phone: [phone]           Dept Fax:  [phone]      Chief Compliant:  Chief Complaint   Patient presents with    Hip Pain     Wound check   Left           History of Present Illness:  Norm returns today.  This is a 63 y.o. male who presents to the clinic today for follow up of Left total hip arthroplasty on 12/12/23. Patient overall reports hip is doing well but unfortunately this past Saturday, 1/5/2023 he noticed some slight drainage from the superior aspect of his wound. Patient was evaluated in the ED where his wound was dressed with steri strips and recommended to follow up with orthopedics.     Patient continues to deny any significant pain in the hip but does note some slight drainage from the wound. He denies any systemic systems, no fever, chills, nausea or vomiting. No recent trauma or falls.     Continues utilizing walker for assistance. Set to start PT next week. 2      Review of Systems   Constitutional: Negative for fever, chills, sweats, recent illness, or recent injury.   Neurological: Negative for headaches, numbness, or weakness.   Integumentary: Negative for rash, itching, ecchymosis, abrasions, or laceration.   Musculoskeletal: Positive for Hip Pain (Wound check /Left /)       Physical Exam:  Constitutional: Patient is oriented to person, place, and time. Patient appears well-developed and well nourished.   Musculoskeletal:    Left hip:  Incision: proximal most aspect with area of dehiscence measuring approx. 1 cm, does not appear to penetrate the joint capsule. Remainder of incision is well approximately. There is no evidence of drainage or purulence.

## 2024-01-10 ENCOUNTER — OFFICE VISIT (OUTPATIENT)
Dept: DERMATOLOGY | Age: 64
End: 2024-01-10

## 2024-01-10 VITALS
BODY MASS INDEX: 48.56 KG/M2 | WEIGHT: 315 LBS | OXYGEN SATURATION: 99 % | HEART RATE: 74 BPM | TEMPERATURE: 99.3 F | DIASTOLIC BLOOD PRESSURE: 88 MMHG | SYSTOLIC BLOOD PRESSURE: 155 MMHG

## 2024-01-10 DIAGNOSIS — T78.3XXA ANGIOEDEMA, INITIAL ENCOUNTER: ICD-10-CM

## 2024-01-10 DIAGNOSIS — L50.9 URTICARIA: Primary | ICD-10-CM

## 2024-01-10 RX ORDER — PREDNISONE 20 MG/1
TABLET ORAL
COMMUNITY

## 2024-01-10 RX ORDER — PREDNISONE 10 MG/1
TABLET ORAL
Qty: 15 TABLET | Refills: 0 | Status: SHIPPED | OUTPATIENT
Start: 2024-01-10 | End: 2024-01-20

## 2024-01-10 RX ORDER — CETIRIZINE HYDROCHLORIDE 10 MG/1
TABLET ORAL
Qty: 120 TABLET | Refills: 0 | Status: SHIPPED | OUTPATIENT
Start: 2024-01-10

## 2024-01-10 RX ORDER — OXYCODONE HYDROCHLORIDE AND ACETAMINOPHEN 5; 325 MG/1; MG/1
1 TABLET ORAL EVERY 4 HOURS PRN
COMMUNITY

## 2024-01-10 NOTE — PROGRESS NOTES
Denies any new changing, growing or bleeding lesions or rashes except as described in the HPI   Constitutional: Denies fevers, chills, and malaise.    PHYSICAL EXAM:   BP (!) 155/88   Pulse 74   Temp 99.3 °F (37.4 °C)   Wt (!) 149.2 kg (329 lb)   SpO2 99%   BMI 48.56 kg/m²     The patient is generally well appearing, well nourished, alert and conversational. Affect is normal.    Cutaneous Exam:  Physical Exam  Focused exam of left lower ext was performed    Facial covering was removed during examination.    Diagnoses/exam findings/medical history pertinent to this visit are listed below:    Assessment:   Diagnosis Orders   1. Urticaria             Plan:  Widespread urticaria + angioedema, not flared at today's visit  No local reaction at site of surgery, and this point suspicion is low for allergy to implant  - referral to Dr. Nam for allergy testing   - continue benadryl in morning/night  - start taking Aller liam 3x daily (2x morning, 1x at night) may increase it to 2x in morning and 2x at night if needed  - continue Pepcid daily   - continue prednisone 20 mg x 5 days, 10 mg x 5 days, then stop      RTC prn    Future Appointments   Date Time Provider Department Center   1/15/2024  8:45 AM Diallo Rosa MD [facility] WND CAR [facility]   1/29/2024  3:35 PM Onofre Ford MD [facility]         Patient Instructions   - referral to Dr. Nam for allergy testing   - continue benadryl in morning/night  - start taking Aller liam 3x daily (2x morning, 1x at night), may increase it to 2x in morning and 2x at night if needed  - continue Pepcid daily    - continue Prednisone 20 mg x 5 days, 10 mg x 5 days, then stop       INancie, personally scribed the services dictated to me by Dr. Coppola in this documentation.     I, Dr. Coppola, personally performed the services described in this documentation, as scribed by Nancie Oropeza in my presence, and it is both accurate and complete.

## 2024-01-10 NOTE — PATIENT INSTRUCTIONS
- referral to Dr. Nam for allergy testing   - continue benadryl in morning/night  - start taking Aller liam 3x daily (2x morning, 1x at night), may increase it to 2x in morning and 2x at night if needed  - continue Pepcid daily    - continue Prednisone 20 mg x 5 days, 10 mg x 5 days, then stop

## 2024-01-13 NOTE — DISCHARGE INSTRUCTIONS
[facility] WOUND and HYPERBARIC TREATMENT  CENTER      Visit  Discharge Instructions / Physician Orders  DATE:1/15/24     Home Care:     SUPPLIES ORDERED THRU:     [employer]    DATE LAST SUPPLIED 1/15/24 (Yolie and Excel SAP 5x7 dressings ordered)     Wound Location:  Left Hip (surgery 1/5/2023/Dr. Ford)     Cleanse with: Liquid antibacterial soap and water, rinse well      Dressing Orders:  Primary dressing   Silvercel  rope to both wounds      Secondary dressing  Cover with Silicone border dressing                         secure with           x 30days     Frequency:  Daily     Additional Orders: Increase protein to diet (meat, cheese, eggs, fish, peanut butter, nuts and beans)  Multivitamin daily    OFFLOADING [] YES  TYPE:                  [x] NA    Weekly wound care visits until determined otherwise.    Antibiotic therapy-wound care related YES [] NO [] NA[x]    MY CHART []     Smart Device  []     HYPERBARIC TREATMENT-                TREATMENT #                          Your next appointment with the Wound Care Center is in 1 week                                                                                                   (Please note your next appointment above and if you are unable to keep, kindly give a 24 hour notice. Thank you.)  If more than 15 min late we cannot guarantee you will be seen due to clinician schedule  Per Policy, Excessive cancellation will call for dismissal from program.  If you experience any of the following, please call the Wound Care Center during business hours:  [phone]     * Increase in Pain  * Temperature over 101  * Increase in drainage from your wound  * Drainage with a foul odor  * Bleeding  * Increase in swelling  * Need for compression bandage changes due to slippage, breakthrough drainage.     If you need medical attention outside of the business hours of the Wound Care Centers please contact your PCP or go to the nearest emergency room.     The

## 2024-01-15 ENCOUNTER — HOSPITAL ENCOUNTER (OUTPATIENT)
Dept: WOUND CARE | Age: 64
Discharge: HOME | End: 2024-01-15
Payer: COMMERCIAL

## 2024-01-15 VITALS
WEIGHT: 312 LBS | BODY MASS INDEX: 46.21 KG/M2 | HEART RATE: 83 BPM | RESPIRATION RATE: 18 BRPM | HEIGHT: 69 IN | TEMPERATURE: 98.1 F | DIASTOLIC BLOOD PRESSURE: 99 MMHG | SYSTOLIC BLOOD PRESSURE: 153 MMHG

## 2024-01-15 DIAGNOSIS — S71.002A OPEN WOUND OF LEFT HIP, INITIAL ENCOUNTER: Primary | ICD-10-CM

## 2024-01-15 PROCEDURE — 99213 OFFICE O/P EST LOW 20 MIN: CPT

## 2024-01-15 PROCEDURE — 11042 DBRDMT SUBQ TIS 1ST 20SQCM/<: CPT | Performed by: INTERNAL MEDICINE

## 2024-01-15 PROCEDURE — 11042 DBRDMT SUBQ TIS 1ST 20SQCM/<: CPT

## 2024-01-15 PROCEDURE — 99203 OFFICE O/P NEW LOW 30 MIN: CPT | Performed by: INTERNAL MEDICINE

## 2024-01-15 RX ORDER — GINSENG 100 MG
CAPSULE ORAL ONCE
OUTPATIENT
Start: 2024-01-15 | End: 2024-01-15

## 2024-01-15 RX ORDER — IBUPROFEN 200 MG
TABLET ORAL ONCE
OUTPATIENT
Start: 2024-01-15 | End: 2024-01-15

## 2024-01-15 RX ORDER — LIDOCAINE 40 MG/G
CREAM TOPICAL ONCE
OUTPATIENT
Start: 2024-01-15 | End: 2024-01-15

## 2024-01-15 RX ORDER — LIDOCAINE HYDROCHLORIDE 20 MG/ML
JELLY TOPICAL ONCE
OUTPATIENT
Start: 2024-01-15 | End: 2024-01-15

## 2024-01-15 RX ORDER — LIDOCAINE HYDROCHLORIDE 40 MG/ML
SOLUTION TOPICAL ONCE
OUTPATIENT
Start: 2024-01-15 | End: 2024-01-15

## 2024-01-15 RX ORDER — LIDOCAINE 50 MG/G
OINTMENT TOPICAL ONCE
OUTPATIENT
Start: 2024-01-15 | End: 2024-01-15

## 2024-01-15 RX ORDER — CLOBETASOL PROPIONATE 0.5 MG/G
OINTMENT TOPICAL ONCE
OUTPATIENT
Start: 2024-01-15 | End: 2024-01-15

## 2024-01-15 RX ORDER — GENTAMICIN SULFATE 1 MG/G
OINTMENT TOPICAL ONCE
OUTPATIENT
Start: 2024-01-15 | End: 2024-01-15

## 2024-01-15 RX ORDER — BETAMETHASONE DIPROPIONATE 0.5 MG/G
CREAM TOPICAL ONCE
OUTPATIENT
Start: 2024-01-15 | End: 2024-01-15

## 2024-01-15 RX ORDER — BACITRACIN ZINC AND POLYMYXIN B SULFATE 500; 1000 [USP'U]/G; [USP'U]/G
OINTMENT TOPICAL ONCE
OUTPATIENT
Start: 2024-01-15 | End: 2024-01-15

## 2024-01-15 RX ORDER — TRIAMCINOLONE ACETONIDE 1 MG/G
OINTMENT TOPICAL ONCE
OUTPATIENT
Start: 2024-01-15 | End: 2024-01-15

## 2024-01-15 RX ORDER — SODIUM CHLOR/HYPOCHLOROUS ACID 0.033 %
SOLUTION, IRRIGATION IRRIGATION ONCE
OUTPATIENT
Start: 2024-01-15 | End: 2024-01-15

## 2024-01-15 ASSESSMENT — PAIN SCALES - GENERAL: PAINLEVEL_OUTOF10: 0

## 2024-01-15 NOTE — PLAN OF CARE
Problem: Pain  Goal: Verbalizes/displays adequate comfort level or baseline comfort level  Outcome: Progressing     Problem: Chronic Conditions and Co-morbidities  Goal: Patient's chronic conditions and co-morbidity symptoms are monitored and maintained or improved  Outcome: Progressing     Problem: Cognitive:  Goal: Knowledge of wound care  Description: Knowledge of wound care  Outcome: Progressing  Goal: Understands risk factors for wounds  Description: Understands risk factors for wounds  Outcome: Progressing     Problem: Wound:  Goal: Will show signs of wound healing; wound closure and no evidence of infection  Description: Will show signs of wound healing; wound closure and no evidence of infection  Outcome: Progressing     Problem: Falls - Risk of:  Goal: Will remain free from falls  Description: Will remain free from falls  Outcome: Progressing

## 2024-01-15 NOTE — PROGRESS NOTES
[facility]ound Care Center   Progress Note and Procedure Note      Norm Hopson  MEDICAL RECORD NUMBER:  [id number]  AGE: 63 y.o.   GENDER: male  : 1960  EPISODE DATE:  1/15/2024    Subjective:     Chief Complaint   Patient presents with    Wound Check     Left hip         HISTORY of PRESENT ILLNESS HPI     Norm Hopson is a 63 y.o. male who presents today for wound/ulcer evaluation.   History of Wound Context: The patient is here for right left lateral hip wound at recent surgical incision.  He had no urinary drainage, no redness.  He had left total hip plasty done 2023.  The wound was healing well until 2025, the surgical incision opened and had mild amount of drainage, was evaluated at the ER on 2024.  He was evaluated by orthopedic surgery on 2024.  He denied significant pain, no fever or chills, no nausea or vomiting, no other complaints.    Ulcer Identification:  Ulcer Type: non-healing surgical    Contributing Factors: diabetes and obesity    Acute Wound: N/A    PAST MEDICAL HISTORY        Diagnosis Date    Abnormal EKG     BY HX    Arthritis     Bell palsy     Cardiomyopathy (HCC)     Chest pain     COVID-19 vaccine administered 2021,2020    Pfizer and has received Booster of Pfizer     Diabetes mellitus (HCC)     History of cardiac cath 10/2023    History of stress test 2023    Hyperlipidemia     Hypertension     Morbid obesity (HCC)     Sleep apnea     HAD SURGERY       PAST SURGICAL HISTORY    Past Surgical History:   Procedure Laterality Date    CARDIAC PROCEDURE N/A 10/30/2023    Left heart cath / coronary angiography performed by Mayank Hazel MD at [facility] CARDIAC CATH LAB    CARDIAC SURGERY      CARDIAC CATH  GREATER THAN 5 YRS AGO    CARPAL TUNNEL RELEASE Right     COLONOSCOPY      COLONOSCOPY N/A 2021    COLONOSCOPY POLYPECTOMY COLD BIOPSY performed by Kate Douglas MD at [facility] ENDO    ELBOW SURGERY Left 2016    lt lateral

## 2024-01-15 NOTE — PROGRESS NOTES
Wound Care Supplies      Supply Company:     [employer]    Ordering Center:     [facility]  [address] [place] [id number]  Ajyjs-[phone]  NHR-[phone]     Patient Information:      Nadia Beck   [facility] [address]   [phone]   : 1960  AGE: 63 y.o.     GENDER: male   EPISODE DATE: 1/15/2024    Insurance:      PRIMARY INSURANCE:  Plan: UMR  Coverage: UMR  Effective Date: 2024  Group Number: [unfilled]  Subscriber Number: [id number] - (Commercial)    Payer/Plan Subscr  Sex Relation Sub. Ins. ID Effective Group Num   1. UMR - UMR NADIA BECK 1960 Male Self [id number]                                   [address] [address]   2. MEDICAL MUTUA* TIA BECKRICIA 1960 Female Spouse [id number]                                   [address]       Patient Wound Information:      Problem List Items Addressed This Visit          Other    Open wound of left hip - Primary       WOUNDS REQUIRING DRESSING SUPPLIES:     Wound 01/15/24 Hip Left wound #1 left hip proximal cluster (Active)   Wound Image   01/15/24 0938   Wound Etiology Non-Healing Surgical 01/15/24 0938   Dressing Status New drainage noted;Old drainage noted 01/15/24 0938   Wound Cleansed Cleansed with saline 01/15/24 0938   Wound Length (cm) 3.2 cm 01/15/24 0938   Wound Width (cm) 0.3 cm 01/15/24 0938   Wound Depth (cm) 0.8 cm 01/15/24 0938   Wound Surface Area (cm^2) 0.96 cm^2 01/15/24 0938   Wound Volume (cm^3) 0.768 cm^3 01/15/24 0938   Post-Procedure Length (cm) 3.2 cm 01/15/24 0938   Post-Procedure Width (cm) 0.3 cm 01/15/24 0938   Post-Procedure Depth (cm) 0.8 cm 01/15/24 0938   Post-Procedure Surface Area (cm^2) 0.96 cm^2 01/15/24 0938   Post-Procedure Volume (cm^3) 0.768 cm^3 01/15/24 0938   Undermining Starts ___ O'Clock 12 01/15/24 0938   Undermining Ends___ O'Clock 12 01/15/24 0938   Undermining Maxium Distance (cm) 1.5@7

## 2024-01-16 ENCOUNTER — TELEPHONE (OUTPATIENT)
Dept: ORTHOPEDIC SURGERY | Age: 64
End: 2024-01-16

## 2024-01-16 NOTE — TELEPHONE ENCOUNTER
Eve from [employer] is asking for a new order to be faxed for a heavy duty walker and to be dated for 12/13/23.  She states the original request was for a standard walker.    Please fax to [phone].    Please return her call if any questions/concerns to [phone].    Thank you.

## 2024-01-22 NOTE — DISCHARGE INSTRUCTIONS
[facility] WOUND and HYPERBARIC TREATMENT  CENTER                            Visit  Discharge Instructions / Physician Orders  DATE:1/26/24     Home Care:     SUPPLIES ORDERED THRU:     [employer]    DATE LAST SUPPLIED 1/15/24 (Yolie and Excel SAP 5x7 dressings ordered)     Wound Location:  Left Hip (surgery 1/5/2023/Dr. Ford)     Cleanse with: Liquid antibacterial soap and water, rinse well      Dressing Orders:  Primary dressing   Silvercel  rope to both wounds      Secondary dressing  Cover with Silicone border dressing                         secure with           x 30days     Frequency:  Daily     Additional Orders: Increase protein to diet (meat, cheese, eggs, fish, peanut butter, nuts and beans)  Multivitamin daily     OFFLOADING [] YES  TYPE:                  [x] NA     Weekly wound care visits until determined otherwise.     Antibiotic therapy-wound care related YES [] NO [] NA[x]     MY CHART []     Smart Device  []      HYPERBARIC TREATMENT-                TREATMENT #                          Your next appointment with the Wound Care Center is in 1 week                                                                                                   (Please note your next appointment above and if you are unable to keep, kindly give a 24 hour notice. Thank you.)  If more than 15 min late we cannot guarantee you will be seen due to clinician schedule  Per Policy, Excessive cancellation will call for dismissal from program.  If you experience any of the following, please call the Wound Care Center during business hours:  [phone]     * Increase in Pain  * Temperature over 101  * Increase in drainage from your wound  * Drainage with a foul odor  * Bleeding  * Increase in swelling  * Need for compression bandage changes due to slippage, breakthrough drainage.     If you need medical attention outside of the business hours of the Wound Care Centers please contact your PCP or go to the nearest

## 2024-01-24 ENCOUNTER — HOSPITAL ENCOUNTER (OUTPATIENT)
Facility: CLINIC | Age: 64
Discharge: HOME | End: 2024-01-24
Payer: COMMERCIAL

## 2024-01-24 LAB
ALT SERPL-CCNC: 10 U/L (ref 5–41)
BASOPHILS # BLD: <0.03 K/UL (ref 0–0.2)
BASOPHILS NFR BLD: 0 % (ref 0–2)
BUN SERPL-MCNC: 11 MG/DL (ref 8–23)
C3 SERPL-MCNC: 143 MG/DL (ref 90–180)
C4 SERPL-MCNC: 40 MG/DL (ref 10–40)
CREAT SERPL-MCNC: 0.9 MG/DL (ref 0.7–1.2)
EOSINOPHIL # BLD: 0.16 K/UL (ref 0–0.44)
EOSINOPHILS RELATIVE PERCENT: 2 % (ref 1–4)
ERYTHROCYTE [DISTWIDTH] IN BLOOD BY AUTOMATED COUNT: 14.2 % (ref 11.8–14.4)
ERYTHROCYTE [SEDIMENTATION RATE] IN BLOOD BY PHOTOMETRIC METHOD: 27 MM/HR (ref 0–20)
GFR SERPL CREATININE-BSD FRML MDRD: >60 ML/MIN/1.73M2
HCT VFR BLD AUTO: 39.6 % (ref 40.7–50.3)
HGB BLD-MCNC: 13.6 G/DL (ref 13–17)
IMM GRANULOCYTES # BLD AUTO: <0.03 K/UL (ref 0–0.3)
IMM GRANULOCYTES NFR BLD: 0 %
LYMPHOCYTES NFR BLD: 0.99 K/UL (ref 1.1–3.7)
LYMPHOCYTES RELATIVE PERCENT: 15 % (ref 24–43)
MCH RBC QN AUTO: 31.4 PG (ref 25.2–33.5)
MCHC RBC AUTO-ENTMCNC: 34.3 G/DL (ref 28.4–34.8)
MCV RBC AUTO: 91.5 FL (ref 82.6–102.9)
MONOCYTES NFR BLD: 0.3 K/UL (ref 0.1–1.2)
MONOCYTES NFR BLD: 5 % (ref 3–12)
NEUTROPHILS NFR BLD: 78 % (ref 36–65)
NEUTS SEG NFR BLD: 5.14 K/UL (ref 1.5–8.1)
NRBC BLD-RTO: 0 PER 100 WBC
PLATELET # BLD AUTO: 190 K/UL (ref 138–453)
PMV BLD AUTO: 10 FL (ref 8.1–13.5)
RBC # BLD AUTO: 4.33 M/UL (ref 4.21–5.77)
RHEUMATOID FACT SER NEPH-ACNC: 10.8 IU/ML
WBC OTHER # BLD: 6.6 K/UL (ref 3.5–11.3)

## 2024-01-24 PROCEDURE — 85025 COMPLETE CBC W/AUTO DIFF WBC: CPT

## 2024-01-24 PROCEDURE — 86038 ANTINUCLEAR ANTIBODIES: CPT

## 2024-01-24 PROCEDURE — 86431 RHEUMATOID FACTOR QUANT: CPT

## 2024-01-24 PROCEDURE — 86225 DNA ANTIBODY NATIVE: CPT

## 2024-01-24 PROCEDURE — 86160 COMPLEMENT ANTIGEN: CPT

## 2024-01-24 PROCEDURE — 36415 COLL VENOUS BLD VENIPUNCTURE: CPT

## 2024-01-24 PROCEDURE — 85652 RBC SED RATE AUTOMATED: CPT

## 2024-01-24 PROCEDURE — 82565 ASSAY OF CREATININE: CPT

## 2024-01-24 PROCEDURE — 86332 IMMUNE COMPLEX ASSAY: CPT

## 2024-01-24 PROCEDURE — 86003 ALLG SPEC IGE CRUDE XTRC EA: CPT

## 2024-01-24 PROCEDURE — 84520 ASSAY OF UREA NITROGEN: CPT

## 2024-01-24 PROCEDURE — 84460 ALANINE AMINO (ALT) (SGPT): CPT

## 2024-01-25 LAB
AVOCADO IGE QN: 0.13 KU/L (ref 0–0.34)
BANANA IGE QN: 0.23 KU/L (ref 0–0.34)
CHESTNUT IGE QN: 0.16 KU/L (ref 0–0.34)
KIWIFRUIT IGE QN: 0.3 KU/L (ref 0–0.34)
LATEX IGE: 0.11 KU/L (ref 0–0.34)

## 2024-01-26 ENCOUNTER — TELEPHONE (OUTPATIENT)
Dept: WOUND CARE | Age: 64
End: 2024-01-26

## 2024-01-26 ENCOUNTER — HOSPITAL ENCOUNTER (OUTPATIENT)
Dept: WOUND CARE | Age: 64
Discharge: HOME | End: 2024-01-26

## 2024-01-26 LAB
ANA SER QL IA: NEGATIVE
DSDNA IGG SER QL IA: 3.2 IU/ML
NUCLEAR IGG SER IA-RTO: 0.2 U/ML

## 2024-01-27 LAB — C1INH SERPL-MCNC: 32 MG/DL (ref 21–38)

## 2024-01-28 NOTE — DISCHARGE INSTRUCTIONS
[facility] WOUND and HYPERBARIC TREATMENT  CENTER                            Visit  Discharge Instructions / Physician Orders  DATE:1/31/24     Home Care:     SUPPLIES ORDERED THRU:     [employer]    DATE LAST SUPPLIED 1/15/24 (Yolie and Excel SAP 5x7 dressings ordered)     Wound Location:  Left Hip (surgery 1/5/2023/Dr. Ford)     Cleanse with: Liquid antibacterial soap and water, rinse well      Dressing Orders:  Primary dressing   Silvercel  rope to both wounds      Secondary dressing  Cover with Silicone border dressing                         secure with           x 30days     Frequency:  Daily     Additional Orders: Increase protein to diet (meat, cheese, eggs, fish, peanut butter, nuts and beans)  Multivitamin daily     OFFLOADING [] YES  TYPE:                  [x] NA     Weekly wound care visits until determined otherwise.     Antibiotic therapy-wound care related YES [] NO [] NA[x]     MY CHART []     Smart Device  []      HYPERBARIC TREATMENT-                TREATMENT #                          Your next appointment with the Wound Care Center is in 1 week                                                                                                   (Please note your next appointment above and if you are unable to keep, kindly give a 24 hour notice. Thank you.)  If more than 15 min late we cannot guarantee you will be seen due to clinician schedule  Per Policy, Excessive cancellation will call for dismissal from program.  If you experience any of the following, please call the Wound Care Center during business hours:  [phone]     * Increase in Pain  * Temperature over 101  * Increase in drainage from your wound  * Drainage with a foul odor  * Bleeding  * Increase in swelling  * Need for compression bandage changes due to slippage, breakthrough drainage.     If you need medical attention outside of the business hours of the Wound Care Centers please contact your PCP or go to the nearest

## 2024-01-29 ENCOUNTER — OFFICE VISIT (OUTPATIENT)
Dept: ORTHOPEDIC SURGERY | Age: 64
End: 2024-01-29

## 2024-01-29 VITALS — WEIGHT: 300 LBS | BODY MASS INDEX: 44.43 KG/M2 | HEIGHT: 69 IN | RESPIRATION RATE: 14 BRPM

## 2024-01-29 DIAGNOSIS — Z96.642 HISTORY OF TOTAL LEFT HIP ARTHROPLASTY: Primary | ICD-10-CM

## 2024-01-29 PROCEDURE — 99024 POSTOP FOLLOW-UP VISIT: CPT | Performed by: ORTHOPAEDIC SURGERY

## 2024-01-29 NOTE — PROGRESS NOTES
Norm returns today status post left total hip ASI on 12/12/2023 Meño overall states his hip feels great he is has 1 small area on the superiormost aspect where he had difficulty trying to get it healed over completely he has no fever or chills he has no active drainage he has been going to wound care however he had canceled out last week he is doing outpatient physical therapy as well    Patient is ambulating just with a cane he states that he has basically no pain still has the meralgia paresthetica in regards to his wound he has about a 1 cm plus opening of the proximal part of the incision there is no redness to this area there is no active drainage whatsoever he does has a dressing over the top of.  Think most of this is likely just to the overlying pannus irritation.  I can motion his hip indiscriminately without any discomfort whatsoever    No new x-rays taken today    Impression  Status post left total hip ASI on 12/12/2023  Delayed wound healing on the proximal 1 cm of the incision no evidence for infections whatsoever    Plan  Patient continue with physical therapy also continue wound care hopefully this will heal over on its own back here in 2 to 3 weeks or call if he has problems prior to that time

## 2024-01-31 ENCOUNTER — HOSPITAL ENCOUNTER (OUTPATIENT)
Dept: WOUND CARE | Age: 64
Discharge: HOME | End: 2024-01-31
Payer: COMMERCIAL

## 2024-01-31 VITALS
RESPIRATION RATE: 16 BRPM | SYSTOLIC BLOOD PRESSURE: 159 MMHG | HEIGHT: 69 IN | BODY MASS INDEX: 44.43 KG/M2 | WEIGHT: 300 LBS | HEART RATE: 84 BPM | DIASTOLIC BLOOD PRESSURE: 96 MMHG | TEMPERATURE: 97.9 F

## 2024-01-31 DIAGNOSIS — S71.009D: ICD-10-CM

## 2024-01-31 DIAGNOSIS — S71.002A OPEN WOUND OF LEFT HIP, INITIAL ENCOUNTER: Primary | ICD-10-CM

## 2024-01-31 PROCEDURE — 11042 DBRDMT SUBQ TIS 1ST 20SQCM/<: CPT | Performed by: NURSE PRACTITIONER

## 2024-01-31 PROCEDURE — 11042 DBRDMT SUBQ TIS 1ST 20SQCM/<: CPT

## 2024-01-31 RX ORDER — GENTAMICIN SULFATE 1 MG/G
OINTMENT TOPICAL ONCE
OUTPATIENT
Start: 2024-01-31 | End: 2024-01-31

## 2024-01-31 RX ORDER — LIDOCAINE HYDROCHLORIDE 40 MG/ML
SOLUTION TOPICAL ONCE
Status: COMPLETED | OUTPATIENT
Start: 2024-01-31 | End: 2024-01-31

## 2024-01-31 RX ORDER — BETAMETHASONE DIPROPIONATE 0.5 MG/G
CREAM TOPICAL ONCE
OUTPATIENT
Start: 2024-01-31 | End: 2024-01-31

## 2024-01-31 RX ORDER — GINSENG 100 MG
CAPSULE ORAL ONCE
OUTPATIENT
Start: 2024-01-31 | End: 2024-01-31

## 2024-01-31 RX ORDER — LIDOCAINE HYDROCHLORIDE 20 MG/ML
JELLY TOPICAL ONCE
OUTPATIENT
Start: 2024-01-31 | End: 2024-01-31

## 2024-01-31 RX ORDER — BACITRACIN ZINC AND POLYMYXIN B SULFATE 500; 1000 [USP'U]/G; [USP'U]/G
OINTMENT TOPICAL ONCE
OUTPATIENT
Start: 2024-01-31 | End: 2024-01-31

## 2024-01-31 RX ORDER — SODIUM CHLOR/HYPOCHLOROUS ACID 0.033 %
SOLUTION, IRRIGATION IRRIGATION ONCE
OUTPATIENT
Start: 2024-01-31 | End: 2024-01-31

## 2024-01-31 RX ORDER — LIDOCAINE 50 MG/G
OINTMENT TOPICAL ONCE
OUTPATIENT
Start: 2024-01-31 | End: 2024-01-31

## 2024-01-31 RX ORDER — LIDOCAINE 40 MG/G
CREAM TOPICAL ONCE
OUTPATIENT
Start: 2024-01-31 | End: 2024-01-31

## 2024-01-31 RX ORDER — IBUPROFEN 200 MG
TABLET ORAL ONCE
OUTPATIENT
Start: 2024-01-31 | End: 2024-01-31

## 2024-01-31 RX ORDER — LIDOCAINE HYDROCHLORIDE 40 MG/ML
SOLUTION TOPICAL ONCE
OUTPATIENT
Start: 2024-01-31 | End: 2024-01-31

## 2024-01-31 RX ORDER — CLOBETASOL PROPIONATE 0.5 MG/G
OINTMENT TOPICAL ONCE
OUTPATIENT
Start: 2024-01-31 | End: 2024-01-31

## 2024-01-31 RX ORDER — TRIAMCINOLONE ACETONIDE 1 MG/G
OINTMENT TOPICAL ONCE
OUTPATIENT
Start: 2024-01-31 | End: 2024-01-31

## 2024-01-31 RX ADMIN — LIDOCAINE HYDROCHLORIDE 5 ML: 40 SOLUTION TOPICAL at 08:37

## 2024-01-31 ASSESSMENT — PAIN SCALES - GENERAL: PAINLEVEL_OUTOF10: 0

## 2024-01-31 ASSESSMENT — ENCOUNTER SYMPTOMS
SHORTNESS OF BREATH: 0
VOMITING: 0
COUGH: 0
RHINORRHEA: 0
NAUSEA: 0
DIARRHEA: 0

## 2024-01-31 NOTE — PROGRESS NOTES
0    hydrochlorothiazide (HYDRODIURIL) 25 MG tablet Take 1 tablet by mouth daily       No current facility-administered medications on file prior to encounter.       REVIEW OF SYSTEMS    Review of Systems   Constitutional:  Negative for chills, fatigue and fever.   HENT:  Negative for congestion and rhinorrhea.    Respiratory:  Negative for cough and shortness of breath.    Cardiovascular:  Negative for chest pain and leg swelling.   Gastrointestinal:  Negative for diarrhea, nausea and vomiting.        Obese   Musculoskeletal:  Negative for gait problem.   Skin:  Positive for wound (left hip wound).   Allergic/Immunologic: Negative for immunocompromised state.   Neurological:  Negative for dizziness, syncope and weakness.   Psychiatric/Behavioral:  Negative for agitation. The patient is not nervous/anxious.        Objective:      BP (!) 159/96   Pulse 84   Temp 97.9 °F (36.6 °C) (Tympanic)   Resp 16   Ht 1.753 m (5' 9\")   Wt 136.1 kg (300 lb)   BMI 44.30 kg/m²     Wt Readings from Last 3 Encounters:   01/31/24 136.1 kg (300 lb)   01/29/24 136.1 kg (300 lb)   01/15/24 (!) 141.5 kg (312 lb)       PHYSICAL EXAM    General Appearance: alert and oriented to person, place and time, well-developed and well-nourished, in no acute distress  Skin: warm and dry, no rash or erythema, left hip wound   Head: normocephalic and atraumatic  Eyes: pupils equal, round and conjunctivae normal  Pulmonary/Chest: normal air movement, no respiratory distress  Extremities: no cyanosis and no clubbing or edema   Musculoskeletal: no joint swelling, deformity or tenderness  Neurologic: gait, coordination normal and speech normal      Assessment:     Problem List Items Addressed This Visit       Open wound of hip, subsequent encounter - Primary        Procedure Note  Indications:  Based on my examination of this patient's wound(s)/ulcer(s) today, debridement is required to promote healing and evaluate the wound base.    Performed by:

## 2024-01-31 NOTE — PLAN OF CARE
Problem: Chronic Conditions and Co-morbidities  Goal: Patient's chronic conditions and co-morbidity symptoms are monitored and maintained or improved  Outcome: Progressing     Problem: Pain  Goal: Verbalizes/displays adequate comfort level or baseline comfort level  Outcome: Progressing     Problem: Cognitive:  Goal: Knowledge of wound care  Description: Knowledge of wound care  Outcome: Progressing  Goal: Understands risk factors for wounds  Description: Understands risk factors for wounds  Outcome: Progressing     Problem: Wound:  Goal: Will show signs of wound healing; wound closure and no evidence of infection  Description: Will show signs of wound healing; wound closure and no evidence of infection  Outcome: Progressing     Problem: Falls - Risk of:  Goal: Will remain free from falls  Description: Will remain free from falls  Outcome: Progressing

## 2024-02-04 LAB — IC SERPL C1Q BIND-ACNC: 2.2 UG EQ/ML (ref 0–3.9)

## 2024-02-05 NOTE — DISCHARGE INSTRUCTIONS
[facility] WOUND and HYPERBARIC TREATMENT  CENTER                            Visit  Discharge Instructions / Physician Orders  DATE:2/7/24     Home Care:     SUPPLIES ORDERED THRU:     [email or web address]    DATE LAST SUPPLIED 1/15/24 (Yolie and Excel SAP 5x7 dressings ordered)     Wound Location:  Left Hip (surgery 1/5/2023/Dr. Ford)     Cleanse with: Liquid antibacterial soap and water, rinse well      Dressing Orders:  Primary dressing   Yolie  both wounds      Secondary dressing  Cover with Silicone border dressing                         secure with           x 30days     Frequency:  Daily     Additional Orders: Increase protein to diet (meat, cheese, eggs, fish, peanut butter, nuts and beans)  Multivitamin daily     OFFLOADING [] YES  TYPE:                  [x] NA     Weekly wound care visits until determined otherwise.     Antibiotic therapy-wound care related YES [] NO [] NA[x]     MY CHART []     Smart Device  []      HYPERBARIC TREATMENT-                TREATMENT #                          Your next appointment with the Wound Care Center is in 1 week with Shelley Friday 2/16/24                                                                                                   (Please note your next appointment above and if you are unable to keep, kindly give a 24 hour notice. Thank you.)  If more than 15 min late we cannot guarantee you will be seen due to clinician schedule  Per Policy, Excessive cancellation will call for dismissal from program.  If you experience any of the following, please call the Wound Care Center during business hours:  [phone]     * Increase in Pain  * Temperature over 101  * Increase in drainage from your wound  * Drainage with a foul odor  * Bleeding  * Increase in swelling  * Need for compression bandage changes due to slippage, breakthrough drainage.     If you need medical attention outside of the business hours of the Wound Care Centers please contact your PCP or go to

## 2024-02-07 ENCOUNTER — HOSPITAL ENCOUNTER (OUTPATIENT)
Dept: WOUND CARE | Age: 64
Discharge: HOME | End: 2024-02-07
Payer: COMMERCIAL

## 2024-02-07 VITALS
HEART RATE: 98 BPM | TEMPERATURE: 98.2 F | HEIGHT: 69 IN | RESPIRATION RATE: 18 BRPM | SYSTOLIC BLOOD PRESSURE: 159 MMHG | WEIGHT: 300 LBS | BODY MASS INDEX: 44.43 KG/M2 | DIASTOLIC BLOOD PRESSURE: 103 MMHG

## 2024-02-07 DIAGNOSIS — S71.009D: Primary | ICD-10-CM

## 2024-02-07 PROCEDURE — 11042 DBRDMT SUBQ TIS 1ST 20SQCM/<: CPT

## 2024-02-07 PROCEDURE — 11042 DBRDMT SUBQ TIS 1ST 20SQCM/<: CPT | Performed by: NURSE PRACTITIONER

## 2024-02-07 RX ORDER — LIDOCAINE HYDROCHLORIDE 20 MG/ML
JELLY TOPICAL ONCE
OUTPATIENT
Start: 2024-02-07 | End: 2024-02-07

## 2024-02-07 RX ORDER — LIDOCAINE HYDROCHLORIDE 40 MG/ML
SOLUTION TOPICAL ONCE
Status: COMPLETED | OUTPATIENT
Start: 2024-02-07 | End: 2024-02-07

## 2024-02-07 RX ORDER — LIDOCAINE 50 MG/G
OINTMENT TOPICAL ONCE
OUTPATIENT
Start: 2024-02-07 | End: 2024-02-07

## 2024-02-07 RX ORDER — SODIUM CHLOR/HYPOCHLOROUS ACID 0.033 %
SOLUTION, IRRIGATION IRRIGATION ONCE
OUTPATIENT
Start: 2024-02-07 | End: 2024-02-07

## 2024-02-07 RX ORDER — LIDOCAINE HYDROCHLORIDE 40 MG/ML
SOLUTION TOPICAL ONCE
OUTPATIENT
Start: 2024-02-07 | End: 2024-02-07

## 2024-02-07 RX ORDER — GINSENG 100 MG
CAPSULE ORAL ONCE
OUTPATIENT
Start: 2024-02-07 | End: 2024-02-07

## 2024-02-07 RX ORDER — BETAMETHASONE DIPROPIONATE 0.5 MG/G
CREAM TOPICAL ONCE
OUTPATIENT
Start: 2024-02-07 | End: 2024-02-07

## 2024-02-07 RX ORDER — BACITRACIN ZINC AND POLYMYXIN B SULFATE 500; 1000 [USP'U]/G; [USP'U]/G
OINTMENT TOPICAL ONCE
OUTPATIENT
Start: 2024-02-07 | End: 2024-02-07

## 2024-02-07 RX ORDER — CLOBETASOL PROPIONATE 0.5 MG/G
OINTMENT TOPICAL ONCE
OUTPATIENT
Start: 2024-02-07 | End: 2024-02-07

## 2024-02-07 RX ORDER — GENTAMICIN SULFATE 1 MG/G
OINTMENT TOPICAL ONCE
OUTPATIENT
Start: 2024-02-07 | End: 2024-02-07

## 2024-02-07 RX ORDER — TRIAMCINOLONE ACETONIDE 1 MG/G
OINTMENT TOPICAL ONCE
OUTPATIENT
Start: 2024-02-07 | End: 2024-02-07

## 2024-02-07 RX ORDER — IBUPROFEN 200 MG
TABLET ORAL ONCE
OUTPATIENT
Start: 2024-02-07 | End: 2024-02-07

## 2024-02-07 RX ORDER — LIDOCAINE 40 MG/G
CREAM TOPICAL ONCE
OUTPATIENT
Start: 2024-02-07 | End: 2024-02-07

## 2024-02-07 RX ADMIN — LIDOCAINE HYDROCHLORIDE 5 ML: 40 SOLUTION TOPICAL at 09:11

## 2024-02-07 ASSESSMENT — ENCOUNTER SYMPTOMS
NAUSEA: 0
RHINORRHEA: 0
COUGH: 0
DIARRHEA: 0
VOMITING: 0
SHORTNESS OF BREATH: 0

## 2024-02-07 ASSESSMENT — PAIN SCALES - GENERAL: PAINLEVEL_OUTOF10: 0

## 2024-02-07 NOTE — PROGRESS NOTES
[facility] Wound Care Center   Progress Note and Procedure Note      Norm Hopson  MEDICAL RECORD NUMBER:  [id number]  AGE: 63 y.o.   GENDER: male  : 1960  EPISODE DATE:  2024    Subjective:     Chief Complaint   Patient presents with    Wound Check     Left hip         HISTORY of PRESENT ILLNESS HPI     Norm Hopson is a 63 y.o. male who presents today for wound/ulcer evaluation.   History of Wound Context: presents in follow up on left lateral hip wound at recent surgical incision. No signs or symptoms of infection. Wound is larger today but he has been doing physical therapy and increasing his exercises which likely contributed to the increase in size. Had left total hip 2023. Wound opened 2024. Has been following with ortho with next appointment 2024.    Wound/Ulcer Pain Timing/Severity: none  Quality of pain: N/A  Severity:  0 / 10   Modifying Factors: None  Associated Signs/Symptoms: none    Ulcer Identification:  Ulcer Type: non-healing surgical  Contributing Factors: diabetes and obesity         PAST MEDICAL HISTORY        Diagnosis Date    Abnormal EKG     BY HX    Arthritis     Bell palsy     Cardiomyopathy (HCC)     Chest pain     COVID-19 vaccine administered 2021,2020    Pfizer and has received Booster of Pfizer     Diabetes mellitus (HCC)     History of cardiac cath 10/2023    History of stress test 2023    Hyperlipidemia     Hypertension     Morbid obesity (HCC)     Sleep apnea     HAD SURGERY       PAST SURGICAL HISTORY    Past Surgical History:   Procedure Laterality Date    CARDIAC PROCEDURE N/A 10/30/2023    Left heart cath / coronary angiography performed by Mayank Hazel MD at [facility] CARDIAC CATH LAB    CARDIAC SURGERY      CARDIAC CATH  GREATER THAN 5 YRS AGO    CARPAL TUNNEL RELEASE Right     COLONOSCOPY      COLONOSCOPY N/A 2021    COLONOSCOPY POLYPECTOMY COLD BIOPSY performed by Kate Douglas MD at [facility] ENDO    ELBOW SURGERY

## 2024-02-13 NOTE — DISCHARGE INSTRUCTIONS
[facility] WOUND and HYPERBARIC TREATMENT  CENTER                            Visit  Discharge Instructions / Physician Orders  DATE:2/16/24     Home Care:     SUPPLIES ORDERED THRU:     [employer]    DATE LAST SUPPLIED 1/15/24 (Yolie and Excel SAP 5x7 dressings ordered)     Wound Location:  Left Hip (surgery 1/5/2023/Dr. Ford)     Cleanse with: Liquid antibacterial soap and water, rinse well      Dressing Orders:  Primary dressing   Yolie  both wounds      Secondary dressing  Cover with Silicone border dressing                         secure with           x 30days     Frequency:  Daily     Additional Orders: Increase protein to diet (meat, cheese, eggs, fish, peanut butter, nuts and beans)  Multivitamin daily     OFFLOADING [] YES  TYPE:                  [x] NA     Weekly wound care visits until determined otherwise.     Antibiotic therapy-wound care related YES [] NO [] NA[x]     MY CHART []     Smart Device  []      HYPERBARIC TREATMENT-                TREATMENT #                          Your next appointment with the Wound Care Center is in 1 week                                                                                                   (Please note your next appointment above and if you are unable to keep, kindly give a 24 hour notice. Thank you.)  If more than 15 min late we cannot guarantee you will be seen due to clinician schedule  Per Policy, Excessive cancellation will call for dismissal from program.  If you experience any of the following, please call the Wound Care Center during business hours:  [phone]     * Increase in Pain  * Temperature over 101  * Increase in drainage from your wound  * Drainage with a foul odor  * Bleeding  * Increase in swelling  * Need for compression bandage changes due to slippage, breakthrough drainage.     If you need medical attention outside of the business hours of the Wound Care Centers please contact your PCP or go to the nearest emergency

## 2024-02-14 ENCOUNTER — APPOINTMENT (OUTPATIENT)
Dept: GENERAL RADIOLOGY | Age: 64
End: 2024-02-14
Payer: COMMERCIAL

## 2024-02-14 ENCOUNTER — HOSPITAL ENCOUNTER (EMERGENCY)
Age: 64
Discharge: HOME | End: 2024-02-14
Attending: EMERGENCY MEDICINE
Payer: COMMERCIAL

## 2024-02-14 VITALS
BODY MASS INDEX: 44.43 KG/M2 | DIASTOLIC BLOOD PRESSURE: 111 MMHG | HEART RATE: 98 BPM | OXYGEN SATURATION: 95 % | HEIGHT: 69 IN | TEMPERATURE: 97.8 F | SYSTOLIC BLOOD PRESSURE: 167 MMHG | RESPIRATION RATE: 18 BRPM | WEIGHT: 300 LBS

## 2024-02-14 DIAGNOSIS — Z23 NEED FOR TDAP VACCINATION: ICD-10-CM

## 2024-02-14 DIAGNOSIS — S68.119A TRAUMATIC AMPUTATION OF TIP OF FINGER, INITIAL ENCOUNTER: Primary | ICD-10-CM

## 2024-02-14 PROCEDURE — 99284 EMERGENCY DEPT VISIT MOD MDM: CPT

## 2024-02-14 PROCEDURE — 6370000000 HC RX 637 (ALT 250 FOR IP): Performed by: PHYSICIAN ASSISTANT

## 2024-02-14 PROCEDURE — 12041 INTMD RPR N-HF/GENIT 2.5CM/<: CPT

## 2024-02-14 PROCEDURE — 73140 X-RAY EXAM OF FINGER(S): CPT

## 2024-02-14 PROCEDURE — 2500000003 HC RX 250 WO HCPCS: Performed by: PHYSICIAN ASSISTANT

## 2024-02-14 RX ORDER — CEPHALEXIN 500 MG/1
500 CAPSULE ORAL 4 TIMES DAILY
Qty: 20 CAPSULE | Refills: 0 | Status: SHIPPED | OUTPATIENT
Start: 2024-02-14 | End: 2024-02-19

## 2024-02-14 RX ORDER — OXYCODONE HYDROCHLORIDE 5 MG/1
5 TABLET ORAL ONCE
Status: COMPLETED | OUTPATIENT
Start: 2024-02-14 | End: 2024-02-14

## 2024-02-14 RX ORDER — CEPHALEXIN 250 MG/1
500 CAPSULE ORAL ONCE
Status: COMPLETED | OUTPATIENT
Start: 2024-02-14 | End: 2024-02-14

## 2024-02-14 RX ORDER — ACETAMINOPHEN 500 MG
1000 TABLET ORAL ONCE
Status: COMPLETED | OUTPATIENT
Start: 2024-02-14 | End: 2024-02-14

## 2024-02-14 RX ORDER — LIDOCAINE HYDROCHLORIDE 10 MG/ML
10 INJECTION, SOLUTION INFILTRATION; PERINEURAL ONCE
Status: COMPLETED | OUTPATIENT
Start: 2024-02-14 | End: 2024-02-14

## 2024-02-14 RX ORDER — OXYCODONE HYDROCHLORIDE AND ACETAMINOPHEN 5; 325 MG/1; MG/1
1 TABLET ORAL EVERY 6 HOURS PRN
Qty: 6 TABLET | Refills: 0 | Status: SHIPPED | OUTPATIENT
Start: 2024-02-14 | End: 2024-02-16

## 2024-02-14 RX ADMIN — OXYCODONE HYDROCHLORIDE 5 MG: 5 TABLET ORAL at 19:14

## 2024-02-14 RX ADMIN — ACETAMINOPHEN 1000 MG: 500 TABLET ORAL at 19:14

## 2024-02-14 RX ADMIN — CEPHALEXIN 500 MG: 250 CAPSULE ORAL at 19:14

## 2024-02-14 RX ADMIN — LIDOCAINE HYDROCHLORIDE 10 ML: 10 INJECTION, SOLUTION INFILTRATION; PERINEURAL at 19:16

## 2024-02-14 NOTE — ED TRIAGE NOTES
Mode of arrival (squad #, walk in, police, etc) : walk in        Chief complaint(s): finger injury        Arrival Note (brief scenario, treatment PTA, etc).: pt states he was at physical therapy and his right pinky finger got caught in the machine and it appears to nearly have fallen off. Pt has dressing on it and bleeding is controlled. Pt denies pain but states it is numb        C= \"Have you ever felt that you should Cut down on your drinking?\"  No  A= \"Have people Annoyed you by criticizing your drinking?\"  No  G= \"Have you ever felt bad or Guilty about your drinking?\"  No  E= \"Have you ever had a drink as an Eye-opener first thing in the morning to steady your nerves or to help a hangover?\"  No      Deferred []      Reason for deferring: N/A    *If yes to two or more: probable alcohol abuse.*

## 2024-02-14 NOTE — ED PROVIDER NOTES
[facility] ED  eMERGENCY dEPARTMENT eNCOUnter   Independent Attestation     Pt Name: Norm Hopson  MRN: [id number]  Birthdate 1960  Date of evaluation: 2/14/24       Norm Hopson is a 63 y.o. male who presents with Finger Injury        Based on the medical record, the care appears appropriate. I was personally available for consultation in the Emergency Department.    Dwain Haynes MD  Attending Emergency  Physician               Dwain Haynes MD  02/14/24 [id number]    
capsule     Sig: Take 1 capsule by mouth 4 times daily for 5 days     Dispense:  20 capsule     Refill:  0    oxyCODONE-acetaminophen (PERCOCET) 5-325 MG per tablet     Sig: Take 1 tablet by mouth every 6 hours as needed for Pain for up to 2 days. Max Daily Amount: 4 tablets     Dispense:  6 tablet     Refill:  0     DISCHARGE PRESCRIPTIONS:  Discharge Medication List as of 2/14/2024  8:35 PM        START taking these medications    Details   cephALEXin (KEFLEX) 500 MG capsule Take 1 capsule by mouth 4 times daily for 5 days, Disp-20 capsule, R-0Normal      oxyCODONE-acetaminophen (PERCOCET) 5-325 MG per tablet Take 1 tablet by mouth every 6 hours as needed for Pain for up to 2 days. Max Daily Amount: 4 tablets, Disp-6 tablet, R-0Normal           PHYSICIAN CONSULTS ORDERED THIS ENCOUNTER:  None  FINAL IMPRESSION      1. Traumatic amputation of tip of finger, initial encounter    2. Need for Tdap vaccination          DISPOSITION/PLAN   DISPOSITION Decision To Discharge 02/14/2024 08:30:23 PM      OUTPATIENT FOLLOW UP FOR THE PATIENT:  Hayder Monreal MD  [address] [address]  [address]  [facility] [id number] [phone]    Schedule an appointment as soon as possible for a visit in 1 day      [facility]  [address] [address]  [phone]  Go to   As needed, If symptoms worsen      PASTMEDICAL HISTORY     Past Medical History:   Diagnosis Date    Abnormal EKG     BY HX    Arthritis     Bell palsy     Cardiomyopathy (HCC)     Chest pain     COVID-19 vaccine administered 1-,12-    Pfizer and has received Booster of Pfizer     Diabetes mellitus (HCC)     History of cardiac cath 10/2023    History of stress test 09/2023    Hyperlipidemia     Hypertension     Morbid obesity (HCC)     Sleep apnea     HAD SURGERY     Patient Active Problem List   Diagnosis Code    Abnormal stress test R94.39    Right-sided Bell's palsy G51.0    Primary osteoarthritis of left hip

## 2024-02-15 ENCOUNTER — CARE COORDINATION (OUTPATIENT)
Dept: OTHER | Facility: CLINIC | Age: 64
End: 2024-02-15

## 2024-02-15 PROCEDURE — 6360000002 HC RX W HCPCS: Performed by: PHYSICIAN ASSISTANT

## 2024-02-15 PROCEDURE — 90715 TDAP VACCINE 7 YRS/> IM: CPT | Performed by: PHYSICIAN ASSISTANT

## 2024-02-15 RX ADMIN — TETANUS TOXOID, REDUCED DIPHTHERIA TOXOID AND ACELLULAR PERTUSSIS VACCINE, ADSORBED 0.5 ML: 5; 2.5; 8; 8; 2.5 SUSPENSION INTRAMUSCULAR at 17:18

## 2024-02-15 NOTE — DISCHARGE INSTRUCTIONS
Keep the splint on and the dressing clean and dry. Avoid any strenuous use of the injured hand. Take the antibiotics as prescribed. Follow up with the hand surgeon as soon as possible, preferably in the next 24-48 hours.

## 2024-02-16 ENCOUNTER — HOSPITAL ENCOUNTER (OUTPATIENT)
Dept: WOUND CARE | Age: 64
Discharge: HOME | End: 2024-02-16
Payer: COMMERCIAL

## 2024-02-16 DIAGNOSIS — S71.009D: Primary | ICD-10-CM

## 2024-02-16 PROCEDURE — 11042 DBRDMT SUBQ TIS 1ST 20SQCM/<: CPT

## 2024-02-16 RX ORDER — LIDOCAINE HYDROCHLORIDE 20 MG/ML
JELLY TOPICAL ONCE
OUTPATIENT
Start: 2024-02-16 | End: 2024-02-16

## 2024-02-16 RX ORDER — LIDOCAINE 40 MG/G
CREAM TOPICAL ONCE
OUTPATIENT
Start: 2024-02-16 | End: 2024-02-16

## 2024-02-16 RX ORDER — LIDOCAINE HYDROCHLORIDE 40 MG/ML
SOLUTION TOPICAL ONCE
OUTPATIENT
Start: 2024-02-16 | End: 2024-02-16

## 2024-02-16 RX ORDER — BETAMETHASONE DIPROPIONATE 0.5 MG/G
CREAM TOPICAL ONCE
OUTPATIENT
Start: 2024-02-16 | End: 2024-02-16

## 2024-02-16 RX ORDER — TRIAMCINOLONE ACETONIDE 1 MG/G
OINTMENT TOPICAL ONCE
OUTPATIENT
Start: 2024-02-16 | End: 2024-02-16

## 2024-02-16 RX ORDER — GINSENG 100 MG
CAPSULE ORAL ONCE
OUTPATIENT
Start: 2024-02-16 | End: 2024-02-16

## 2024-02-16 RX ORDER — MONTELUKAST SODIUM 10 MG/1
10 TABLET ORAL NIGHTLY
COMMUNITY

## 2024-02-16 RX ORDER — GENTAMICIN SULFATE 1 MG/G
OINTMENT TOPICAL ONCE
OUTPATIENT
Start: 2024-02-16 | End: 2024-02-16

## 2024-02-16 RX ORDER — CLOBETASOL PROPIONATE 0.5 MG/G
OINTMENT TOPICAL ONCE
OUTPATIENT
Start: 2024-02-16 | End: 2024-02-16

## 2024-02-16 RX ORDER — LIDOCAINE 50 MG/G
OINTMENT TOPICAL ONCE
OUTPATIENT
Start: 2024-02-16 | End: 2024-02-16

## 2024-02-16 RX ORDER — BACITRACIN ZINC AND POLYMYXIN B SULFATE 500; 1000 [USP'U]/G; [USP'U]/G
OINTMENT TOPICAL ONCE
OUTPATIENT
Start: 2024-02-16 | End: 2024-02-16

## 2024-02-16 RX ORDER — IBUPROFEN 200 MG
TABLET ORAL ONCE
OUTPATIENT
Start: 2024-02-16 | End: 2024-02-16

## 2024-02-16 RX ORDER — SODIUM CHLOR/HYPOCHLOROUS ACID 0.033 %
SOLUTION, IRRIGATION IRRIGATION ONCE
OUTPATIENT
Start: 2024-02-16 | End: 2024-02-16

## 2024-02-16 RX ORDER — LIDOCAINE HYDROCHLORIDE 40 MG/ML
SOLUTION TOPICAL ONCE
Status: COMPLETED | OUTPATIENT
Start: 2024-02-16 | End: 2024-02-16

## 2024-02-16 RX ADMIN — LIDOCAINE HYDROCHLORIDE 5 ML: 40 SOLUTION TOPICAL at 09:30

## 2024-02-16 NOTE — PROGRESS NOTES
[facility] Wound Care Center   Progress Note and Procedure Note      Norm Hopson  MEDICAL RECORD NUMBER:  [id number]  AGE: 63 y.o.   GENDER: male  : 1960  EPISODE DATE:  2024    Subjective:     Chief Complaint   Patient presents with    Wound Check     Left hip         HISTORY of PRESENT ILLNESS HPI     Norm Hopson is a 63 y.o. male who presents today for wound/ulcer evaluation.   History of Wound Context: The patient is here for right left lateral hip wound at recent surgical incision.  No purulent drainage, no surrounding redness.  He had left hip arthroplasty done 2023.  The wound was healing well until 2025, the surgical incision opened and had mild amount of drainage, was evaluated at the ER on 2024.  He has been following with orthopedic surgeon  He denied significant pain, no fever or chills, no nausea or vomiting, no other complaints.  He was seen at the ER on 2024 for right small finger crush injury by physical therapy equipment.  It was irrigated and repaired, tetanus updated, splint in place, not removed today.  Ulcer Identification:  Ulcer Type: non-healing surgical    Contributing Factors: diabetes and obesity    Acute Wound: N/A    PAST MEDICAL HISTORY        Diagnosis Date    Abnormal EKG     BY HX    Arthritis     Bell palsy     Cardiomyopathy (HCC)     Chest pain     COVID-19 vaccine administered 2021,2020    Pfizer and has received Booster of Pfizer     Diabetes mellitus (HCC)     History of cardiac cath 10/2023    History of stress test 2023    Hyperlipidemia     Hypertension     Morbid obesity (HCC)     Sleep apnea     HAD SURGERY       PAST SURGICAL HISTORY    Past Surgical History:   Procedure Laterality Date    CARDIAC PROCEDURE N/A 10/30/2023    Left heart cath / coronary angiography performed by Mayank Hazel MD at [facility] CARDIAC CATH LAB    CARDIAC SURGERY      CARDIAC CATH  GREATER THAN 5 YRS AGO    CARPAL TUNNEL RELEASE

## 2024-02-16 NOTE — PLAN OF CARE
Problem: Chronic Conditions and Co-morbidities  Goal: Patient's chronic conditions and co-morbidity symptoms are monitored and maintained or improved  Outcome: Progressing     Problem: Cognitive:  Goal: Knowledge of wound care  Description: Knowledge of wound care  Outcome: Progressing  Goal: Understands risk factors for wounds  Description: Understands risk factors for wounds  Outcome: Progressing     Problem: Wound:  Goal: Will show signs of wound healing; wound closure and no evidence of infection  Description: Will show signs of wound healing; wound closure and no evidence of infection  Outcome: Progressing     Problem: Falls - Risk of:  Goal: Will remain free from falls  Description: Will remain free from falls  Outcome: Progressing

## 2024-02-19 ENCOUNTER — CARE COORDINATION (OUTPATIENT)
Dept: OTHER | Facility: CLINIC | Age: 64
End: 2024-02-19

## 2024-02-19 NOTE — CARE COORDINATION
Ambulatory Care Coordination Note    ACM attempted to reach patient for care management follow up call regarding follow up appts and plan of care. HIPAA compliant message left requesting a return phone call at patient convenience.     Plan for follow-up call in 1-2 days    Future Appointments   Date Time Provider Department Center   2/21/2024  2:45 PM Joy Ayala APRN - CNP RAY WND [facility]   2/23/2024 11:20 AM [facility]   3/1/2024  9:30 AM Diallo Rosa MD ST[facility] WND CAR [facility]   3/4/2024 10:00 AM Hayder Monreal MD [facility] None

## 2024-02-19 NOTE — DISCHARGE INSTRUCTIONS
DISCHARGE INSTRUCTIONS FOR HAND/WRIST SURGERY    In order to continue your care at home, please follow the instructions below.    For General Anesthesia  Do not drink any alcoholic beverages or make any legal or important decisions for 24 hours.     Diet    After general anesthesia, start out eating lightly (broth, soup, crackers, toast, etc.) advancing as tolerated to your usual diet.  Try to avoid spicy or greasy/fatty foods for 24 hours.   Drink plenty of fluids after surgery, unless you are on a fluid restriction.  Avoid milk/milk product for several hours.      Medications  Take medications as instructed by your surgeon.   Please do not take prescribed pain medication with alcoholic beverages.  When cleared to drive by your surgeon, please do not drive or operate machinery while taking any prescribed pain medication.    Activities  As instructed by your surgeon  Limit your activities for 24 hours  Avoid heavy work or sports until surgeon approves.   No lifting, pushing, pulling, twisting, or pressing down on hand or wrist.  No driving or operating machinery until cleared by surgeon.  May shower as long as dressings stay dry with plastic bag coverage.    Surgery Area  Always keep dressings/incisions clean, dry.  Do not remove dressings until seen in office, unless instructed otherwise by your surgeon.  To reduce swelling and pain,  keep surgical hand/wrist elevated above heart level with pillows.    Call your surgeon for the following:  You have pain that does not get better after you take pain medicine.   For an oral temperature (by mouth) is 101 degrees or higher, chills, or excessive sweating.  You have increasing and progressive bleeding or drainage from surgery site.  Signs of an infection:  increased swelling, redness, warmth, or hardness around surgery area or yellow or green drainage from incision.  If your fingers are pale, blue or cold to touch.  If swelling increases while elevated.  Persistent nausea

## 2024-02-19 NOTE — PROGRESS NOTES
DAY OF SURGERY/PROCEDURE  GUIDELINES    As a patient at the [facility], you can expect quality medical and nursing care that is centered on your individual needs. It is our goal to make your surgical experience as comfortable and excellent as possible.  ________________________________________________________________________    The following instructions are general guidelines, if any information on this sheet is different from what your doctor has instructed you to do, please follow your doctor's instructions.    Please arrive on 02/22/2024 @ 0630      Enter through entrance C. Check in at registration     Upon arrival you will be taken to the pre-operative area to get ready for surgery, your family will stay in the waiting room and visit with you once you are ready for surgery. Due to special limitations please limit visitation to 1-2 members of your family at a time. When it is time for surgery your family will return to the waiting room.    Nothing to eat, drink, smoke, suck or chew after midnight (no water, gum, mints, cigarettes, cigars, pipes, snuff, chewing tobacco, etc.) or your surgery may be canceled.     Take a shower or bath on the morning of your surgery/procedure (Hibiclens if directed) Do not apply any lotions.    Brush your teeth, but do not swallow any water    IN CASE OF ILLNESS - If you have a cold or flu symptoms (high fever, runny nose, sore throat, cough, etc.) rash, nausea, vomiting, loose stools, and/or recent contact with someone who has a contagious disease (chick pox, measles, etc.) please call your doctor before coming to the surgery center    Take a small sip of water with heart, blood pressure, and/or seizure medication the morning of surgery.   Carvedilol and pepcid    If applicable bring your:  Inhaler (s)  Hearing aid(s)  Eyeglasses and Case (If you wear contacts they have to be removed before surgery, bring case and solution)  CPAP     DO NOT take

## 2024-02-20 ENCOUNTER — ANESTHESIA EVENT (OUTPATIENT)
Dept: OPERATING ROOM | Age: 64
End: 2024-02-20
Payer: COMMERCIAL

## 2024-02-20 ENCOUNTER — CARE COORDINATION (OUTPATIENT)
Dept: OTHER | Facility: CLINIC | Age: 64
End: 2024-02-20

## 2024-02-20 NOTE — CARE COORDINATION
ED Follow Up Call    2024    Patient: Norm Hopson Patient : 1960   MRN: [id number]  Reason for Admission: Finger injury  Discharge Date: 24        Care Transitions ED Follow Up    Care Transitions Interventions    Physical Therapy: Completed      Specialty Service Referral: Completed    Schedule Follow Up Appointment with Physician: Completed  Do you have any ongoing symptoms?: Yes   Onset of Patient-reported symptoms: In the past 7 days   Patient-reported symptoms: Pain, Other   Do you have a copy of your discharge instructions?: Yes   Do you understand what to report and when to return?: Yes   Are you following your discharge instructions?: Yes   Do you have all of your prescriptions and are they filled?: Yes   Have you scheduled your follow up appointment?: Yes   How are you going to get to your appointment?: Car - family or friend to transport   Were you discharged with any Home Care or Post Acute Services or do you currently have any active services?: Yes   Post Acute Services: Outpatient/Community Services (Comment: Established with PT)         Do you have any needs or concerns that I can assist you with?: No   Identified Barriers: Impairment        Ambulatory Care Manager (ACM) contacted the patient by telephone to follow up on recent ED visit to discuss progress, new issues or concerns, assess ongoing needs, and reinforce/ provide patient education. Verified name and  with patient as identifiers.     Patient reports that he was able to see the surgeon yesterday. He is scheduled to have surgery on 24.  He states it is undetermined if the end of the finger will have to be amputated until the surgeon is able to assess it.  He feels his pain is minimal and well controlled.  He denies any new or worsening symptoms.  He denies any new issues or concerns at this time and is agreeable to follow up contact.     Interventions:    Counseling and education provided at today's visit on:   Red Flag

## 2024-02-20 NOTE — DISCHARGE INSTRUCTIONS
[facility]                            Visit  Discharge Instructions / Physician Orders  DATE:2/21/24     Home Care:     SUPPLIES ORDERED THRU:     [employer]    DATE LAST SUPPLIED 1/15/24 (Yolie and Excel SAP 5x7 dressings ordered)     Wound Location:  Left Hip (surgery 1/5/2023/Dr. Ford)     Cleanse with: Liquid antibacterial soap and water, rinse well      Dressing Orders:  Primary dressing   Yolie  both wounds      Secondary dressing  Cover with Silicone border dressing                         secure with           x 30days     Frequency:  Daily     Additional Orders: Increase protein to diet (meat, cheese, eggs, fish, peanut butter, nuts and beans)  Multivitamin daily     OFFLOADING [] YES  TYPE:                  [x] NA     Weekly wound care visits until determined otherwise.     Antibiotic therapy-wound care related YES [] NO [] NA[x]     MY CHART []     Smart Device  []      HYPERBARIC TREATMENT-                TREATMENT #                          Your next appointment with the Wound Care Center is in 1 week                                                                                                   (Please note your next appointment above and if you are unable to keep, kindly give a 24 hour notice. Thank you.)  If more than 15 min late we cannot guarantee you will be seen due to clinician schedule  Per Policy, Excessive cancellation will call for dismissal from program.  If you experience any of the following, please call the Wound Care Center during business hours:  [phone]     * Increase in Pain  * Temperature over 101  * Increase in drainage from your wound  * Drainage with a foul odor  * Bleeding  * Increase in swelling  * Need for compression bandage changes due to slippage, breakthrough drainage.     If you need medical attention outside of the business hours of the Wound Care Centers please contact your PCP or go to the nearest emergency

## 2024-02-21 ENCOUNTER — HOSPITAL ENCOUNTER (OUTPATIENT)
Dept: WOUND CARE | Age: 64
Discharge: HOME | End: 2024-02-21
Payer: COMMERCIAL

## 2024-02-21 VITALS
RESPIRATION RATE: 18 BRPM | BODY MASS INDEX: 45.47 KG/M2 | WEIGHT: 300 LBS | SYSTOLIC BLOOD PRESSURE: 165 MMHG | HEIGHT: 68 IN | DIASTOLIC BLOOD PRESSURE: 91 MMHG | HEART RATE: 95 BPM | TEMPERATURE: 99.3 F

## 2024-02-21 DIAGNOSIS — S71.009D: Primary | ICD-10-CM

## 2024-02-21 PROCEDURE — 11042 DBRDMT SUBQ TIS 1ST 20SQCM/<: CPT

## 2024-02-21 PROCEDURE — 11042 DBRDMT SUBQ TIS 1ST 20SQCM/<: CPT | Performed by: NURSE PRACTITIONER

## 2024-02-21 RX ORDER — LIDOCAINE 40 MG/G
CREAM TOPICAL ONCE
OUTPATIENT
Start: 2024-02-21 | End: 2024-02-21

## 2024-02-21 RX ORDER — LIDOCAINE HYDROCHLORIDE 20 MG/ML
JELLY TOPICAL ONCE
OUTPATIENT
Start: 2024-02-21 | End: 2024-02-21

## 2024-02-21 RX ORDER — CLOBETASOL PROPIONATE 0.5 MG/G
OINTMENT TOPICAL ONCE
OUTPATIENT
Start: 2024-02-21 | End: 2024-02-21

## 2024-02-21 RX ORDER — GINSENG 100 MG
CAPSULE ORAL ONCE
OUTPATIENT
Start: 2024-02-21 | End: 2024-02-21

## 2024-02-21 RX ORDER — BETAMETHASONE DIPROPIONATE 0.5 MG/G
CREAM TOPICAL ONCE
OUTPATIENT
Start: 2024-02-21 | End: 2024-02-21

## 2024-02-21 RX ORDER — GENTAMICIN SULFATE 1 MG/G
OINTMENT TOPICAL ONCE
OUTPATIENT
Start: 2024-02-21 | End: 2024-02-21

## 2024-02-21 RX ORDER — SODIUM CHLOR/HYPOCHLOROUS ACID 0.033 %
SOLUTION, IRRIGATION IRRIGATION ONCE
OUTPATIENT
Start: 2024-02-21 | End: 2024-02-21

## 2024-02-21 RX ORDER — TRIAMCINOLONE ACETONIDE 1 MG/G
OINTMENT TOPICAL ONCE
OUTPATIENT
Start: 2024-02-21 | End: 2024-02-21

## 2024-02-21 RX ORDER — LIDOCAINE 50 MG/G
OINTMENT TOPICAL ONCE
OUTPATIENT
Start: 2024-02-21 | End: 2024-02-21

## 2024-02-21 RX ORDER — BACITRACIN ZINC AND POLYMYXIN B SULFATE 500; 1000 [USP'U]/G; [USP'U]/G
OINTMENT TOPICAL ONCE
OUTPATIENT
Start: 2024-02-21 | End: 2024-02-21

## 2024-02-21 RX ORDER — IBUPROFEN 200 MG
TABLET ORAL ONCE
OUTPATIENT
Start: 2024-02-21 | End: 2024-02-21

## 2024-02-21 RX ORDER — LIDOCAINE HYDROCHLORIDE 40 MG/ML
SOLUTION TOPICAL ONCE
OUTPATIENT
Start: 2024-02-21 | End: 2024-02-21

## 2024-02-21 RX ORDER — LIDOCAINE HYDROCHLORIDE 40 MG/ML
SOLUTION TOPICAL ONCE
Status: COMPLETED | OUTPATIENT
Start: 2024-02-21 | End: 2024-02-21

## 2024-02-21 RX ADMIN — LIDOCAINE HYDROCHLORIDE 5 ML: 40 SOLUTION TOPICAL at 14:52

## 2024-02-21 ASSESSMENT — ENCOUNTER SYMPTOMS
DIARRHEA: 0
COUGH: 0
NAUSEA: 0
SHORTNESS OF BREATH: 0
RHINORRHEA: 0
VOMITING: 0

## 2024-02-21 NOTE — PROGRESS NOTES
[facility]   Progress Note and Procedure Note      Norm Hopson  MEDICAL RECORD NUMBER:  [id number]  AGE: 63 y.o.   GENDER: male  : 1960  EPISODE DATE:  2024    Subjective:     Chief Complaint   Patient presents with    Wound Check     Left hip         HISTORY of PRESENT ILLNESS HPI     Norm Hopson is a 63 y.o. male who presents today for wound/ulcer evaluation.   History of Wound Context: presents in follow up on left lateral hip wound. Had left total hip arthroplasty 2023. Wound opened 2024. Will be having right small finger surgery tomorrow with plastics after crush injury at physical therapy.   Wound/Ulcer Pain Timing/Severity: none  Quality of pain: N/A  Severity:  0 / 10   Modifying Factors: None  Associated Signs/Symptoms: none    Ulcer Identification:  Ulcer Type: non-healing surgical  Contributing Factors: diabetes and obesity         PAST MEDICAL HISTORY        Diagnosis Date    Abnormal EKG     BY HX    Arthritis     Bell palsy     Cardiomyopathy (HCC)     Chest pain     COVID-19 vaccine administered 2021,2020    Pfizer and has received Booster of Pfizer     Diabetes mellitus (HCC)     History of cardiac cath 10/2023    History of stress test 2023    Hyperlipidemia     Hypertension     Morbid obesity (HCC)     Sleep apnea     HAD SURGERY       PAST SURGICAL HISTORY    Past Surgical History:   Procedure Laterality Date    CARDIAC PROCEDURE N/A 10/30/2023    Left heart cath / coronary angiography performed by Mayank Hazel MD at [facility] CARDIAC CATH LAB    CARDIAC SURGERY      CARDIAC CATH  GREATER THAN 5 YRS AGO    CARPAL TUNNEL RELEASE Right     COLONOSCOPY      COLONOSCOPY N/A 2021    COLONOSCOPY POLYPECTOMY COLD BIOPSY performed by Kate Douglas MD at [facility] ENDO    ELBOW SURGERY Left 2016    lt lateral epicondylectomy    EYE SURGERY Right     JOINT REPLACEMENT Bilateral     KNEES    KNEE ARTHROSCOPY Bilateral     2-3x

## 2024-02-22 ENCOUNTER — HOSPITAL ENCOUNTER (OUTPATIENT)
Age: 64
Setting detail: OUTPATIENT SURGERY
Discharge: HOME | End: 2024-02-22
Attending: PLASTIC SURGERY | Admitting: PLASTIC SURGERY
Payer: COMMERCIAL

## 2024-02-22 ENCOUNTER — APPOINTMENT (OUTPATIENT)
Dept: GENERAL RADIOLOGY | Age: 64
End: 2024-02-22
Attending: PLASTIC SURGERY
Payer: COMMERCIAL

## 2024-02-22 ENCOUNTER — ANESTHESIA (OUTPATIENT)
Dept: OPERATING ROOM | Age: 64
End: 2024-02-22
Payer: COMMERCIAL

## 2024-02-22 VITALS
OXYGEN SATURATION: 92 % | RESPIRATION RATE: 13 BRPM | SYSTOLIC BLOOD PRESSURE: 120 MMHG | DIASTOLIC BLOOD PRESSURE: 78 MMHG | TEMPERATURE: 97.2 F | BODY MASS INDEX: 47.74 KG/M2 | WEIGHT: 315 LBS | HEART RATE: 85 BPM | HEIGHT: 68 IN

## 2024-02-22 DIAGNOSIS — S62.606A CLOSED NONDISPLACED FRACTURE OF PHALANX OF RIGHT LITTLE FINGER, UNSPECIFIED PHALANX, INITIAL ENCOUNTER: ICD-10-CM

## 2024-02-22 DIAGNOSIS — S62.636B DISPLACED FRACTURE OF DISTAL PHALANX OF RIGHT LITTLE FINGER, INITIAL ENCOUNTER FOR OPEN FRACTURE: Primary | ICD-10-CM

## 2024-02-22 LAB
ANION GAP SERPL CALCULATED.3IONS-SCNC: 12 MMOL/L (ref 9–17)
BUN SERPL-MCNC: 14 MG/DL (ref 8–23)
CALCIUM SERPL-MCNC: 9.5 MG/DL (ref 8.6–10.4)
CHLORIDE SERPL-SCNC: 101 MMOL/L (ref 98–107)
CO2 SERPL-SCNC: 27 MMOL/L (ref 20–31)
CREAT SERPL-MCNC: 0.8 MG/DL (ref 0.7–1.2)
GFR SERPL CREATININE-BSD FRML MDRD: >60 ML/MIN/1.73M2
GLUCOSE SERPL-MCNC: 133 MG/DL (ref 70–99)
POTASSIUM SERPL-SCNC: 4.2 MMOL/L (ref 3.7–5.3)
SODIUM SERPL-SCNC: 140 MMOL/L (ref 135–144)

## 2024-02-22 PROCEDURE — 3600000003 HC SURGERY LEVEL 3 BASE: Performed by: PLASTIC SURGERY

## 2024-02-22 PROCEDURE — 36415 COLL VENOUS BLD VENIPUNCTURE: CPT

## 2024-02-22 PROCEDURE — 2709999900 HC NON-CHARGEABLE SUPPLY: Performed by: PLASTIC SURGERY

## 2024-02-22 PROCEDURE — 3700000001 HC ADD 15 MINUTES (ANESTHESIA): Performed by: PLASTIC SURGERY

## 2024-02-22 PROCEDURE — 2500000003 HC RX 250 WO HCPCS: Performed by: PLASTIC SURGERY

## 2024-02-22 PROCEDURE — 7100000010 HC PHASE II RECOVERY - FIRST 15 MIN: Performed by: PLASTIC SURGERY

## 2024-02-22 PROCEDURE — 2580000003 HC RX 258: Performed by: ANESTHESIOLOGY

## 2024-02-22 PROCEDURE — 6360000002 HC RX W HCPCS: Performed by: NURSE ANESTHETIST, CERTIFIED REGISTERED

## 2024-02-22 PROCEDURE — 3700000000 HC ANESTHESIA ATTENDED CARE: Performed by: PLASTIC SURGERY

## 2024-02-22 PROCEDURE — 2500000003 HC RX 250 WO HCPCS: Performed by: NURSE ANESTHETIST, CERTIFIED REGISTERED

## 2024-02-22 PROCEDURE — 80048 BASIC METABOLIC PNL TOTAL CA: CPT

## 2024-02-22 PROCEDURE — 7100000000 HC PACU RECOVERY - FIRST 15 MIN: Performed by: PLASTIC SURGERY

## 2024-02-22 PROCEDURE — 88311 DECALCIFY TISSUE: CPT

## 2024-02-22 PROCEDURE — 82947 ASSAY GLUCOSE BLOOD QUANT: CPT

## 2024-02-22 PROCEDURE — 88305 TISSUE EXAM BY PATHOLOGIST: CPT

## 2024-02-22 PROCEDURE — 6370000000 HC RX 637 (ALT 250 FOR IP): Performed by: PLASTIC SURGERY

## 2024-02-22 PROCEDURE — 7100000001 HC PACU RECOVERY - ADDTL 15 MIN: Performed by: PLASTIC SURGERY

## 2024-02-22 PROCEDURE — 3600000013 HC SURGERY LEVEL 3 ADDTL 15MIN: Performed by: PLASTIC SURGERY

## 2024-02-22 RX ORDER — GINSENG 100 MG
CAPSULE ORAL PRN
Status: DISCONTINUED | OUTPATIENT
Start: 2024-02-22 | End: 2024-02-22 | Stop reason: ALTCHOICE

## 2024-02-22 RX ORDER — SODIUM CHLORIDE 9 MG/ML
INJECTION, SOLUTION INTRAVENOUS PRN
Status: DISCONTINUED | OUTPATIENT
Start: 2024-02-22 | End: 2024-02-22 | Stop reason: HOSPADM

## 2024-02-22 RX ORDER — DIPHENHYDRAMINE HYDROCHLORIDE 50 MG/ML
12.5 INJECTION INTRAMUSCULAR; INTRAVENOUS
Status: DISCONTINUED | OUTPATIENT
Start: 2024-02-22 | End: 2024-02-22 | Stop reason: HOSPADM

## 2024-02-22 RX ORDER — SODIUM CHLORIDE 0.9 % (FLUSH) 0.9 %
5-40 SYRINGE (ML) INJECTION PRN
Status: DISCONTINUED | OUTPATIENT
Start: 2024-02-22 | End: 2024-02-22 | Stop reason: HOSPADM

## 2024-02-22 RX ORDER — AMOXICILLIN AND CLAVULANATE POTASSIUM 875; 125 MG/1; MG/1
1 TABLET, FILM COATED ORAL 2 TIMES DAILY
Qty: 14 TABLET | Refills: 0 | Status: SHIPPED | OUTPATIENT
Start: 2024-02-22 | End: 2024-02-29

## 2024-02-22 RX ORDER — OXYCODONE HYDROCHLORIDE 5 MG/1
5 TABLET ORAL PRN
Status: DISCONTINUED | OUTPATIENT
Start: 2024-02-22 | End: 2024-02-22 | Stop reason: HOSPADM

## 2024-02-22 RX ORDER — SODIUM CHLORIDE 0.9 % (FLUSH) 0.9 %
5-40 SYRINGE (ML) INJECTION EVERY 12 HOURS SCHEDULED
Status: DISCONTINUED | OUTPATIENT
Start: 2024-02-22 | End: 2024-02-22 | Stop reason: HOSPADM

## 2024-02-22 RX ORDER — SODIUM CHLORIDE 9 MG/ML
INJECTION, SOLUTION INTRAVENOUS CONTINUOUS
Status: DISCONTINUED | OUTPATIENT
Start: 2024-02-22 | End: 2024-02-22 | Stop reason: HOSPADM

## 2024-02-22 RX ORDER — MEPERIDINE HYDROCHLORIDE 50 MG/ML
12.5 INJECTION INTRAMUSCULAR; INTRAVENOUS; SUBCUTANEOUS ONCE
Status: DISCONTINUED | OUTPATIENT
Start: 2024-02-22 | End: 2024-02-22 | Stop reason: HOSPADM

## 2024-02-22 RX ORDER — OXYCODONE HYDROCHLORIDE AND ACETAMINOPHEN 5; 325 MG/1; MG/1
1 TABLET ORAL EVERY 6 HOURS PRN
Qty: 28 TABLET | Refills: 0 | Status: SHIPPED | OUTPATIENT
Start: 2024-02-22 | End: 2024-02-29

## 2024-02-22 RX ORDER — OXYCODONE HYDROCHLORIDE 5 MG/1
10 TABLET ORAL PRN
Status: DISCONTINUED | OUTPATIENT
Start: 2024-02-22 | End: 2024-02-22 | Stop reason: HOSPADM

## 2024-02-22 RX ORDER — MIDAZOLAM HYDROCHLORIDE 1 MG/ML
INJECTION INTRAMUSCULAR; INTRAVENOUS PRN
Status: DISCONTINUED | OUTPATIENT
Start: 2024-02-22 | End: 2024-02-22 | Stop reason: SDUPTHER

## 2024-02-22 RX ORDER — ONDANSETRON 2 MG/ML
4 INJECTION INTRAMUSCULAR; INTRAVENOUS
Status: DISCONTINUED | OUTPATIENT
Start: 2024-02-22 | End: 2024-02-22 | Stop reason: HOSPADM

## 2024-02-22 RX ORDER — DEXAMETHASONE SODIUM PHOSPHATE 10 MG/ML
INJECTION, SOLUTION INTRAMUSCULAR; INTRAVENOUS PRN
Status: DISCONTINUED | OUTPATIENT
Start: 2024-02-22 | End: 2024-02-22 | Stop reason: SDUPTHER

## 2024-02-22 RX ORDER — PHENYLEPHRINE HCL IN 0.9% NACL 1 MG/10 ML
SYRINGE (ML) INTRAVENOUS PRN
Status: DISCONTINUED | OUTPATIENT
Start: 2024-02-22 | End: 2024-02-22 | Stop reason: SDUPTHER

## 2024-02-22 RX ORDER — KETOROLAC TROMETHAMINE 30 MG/ML
INJECTION, SOLUTION INTRAMUSCULAR; INTRAVENOUS PRN
Status: DISCONTINUED | OUTPATIENT
Start: 2024-02-22 | End: 2024-02-22 | Stop reason: SDUPTHER

## 2024-02-22 RX ORDER — PROPOFOL 10 MG/ML
INJECTION, EMULSION INTRAVENOUS PRN
Status: DISCONTINUED | OUTPATIENT
Start: 2024-02-22 | End: 2024-02-22 | Stop reason: SDUPTHER

## 2024-02-22 RX ORDER — ONDANSETRON 2 MG/ML
INJECTION INTRAMUSCULAR; INTRAVENOUS PRN
Status: DISCONTINUED | OUTPATIENT
Start: 2024-02-22 | End: 2024-02-22 | Stop reason: SDUPTHER

## 2024-02-22 RX ORDER — HYDRALAZINE HYDROCHLORIDE 20 MG/ML
10 INJECTION INTRAMUSCULAR; INTRAVENOUS
Status: DISCONTINUED | OUTPATIENT
Start: 2024-02-22 | End: 2024-02-22 | Stop reason: HOSPADM

## 2024-02-22 RX ORDER — MIDAZOLAM HYDROCHLORIDE 2 MG/2ML
2 INJECTION, SOLUTION INTRAMUSCULAR; INTRAVENOUS
Status: DISCONTINUED | OUTPATIENT
Start: 2024-02-22 | End: 2024-02-22 | Stop reason: HOSPADM

## 2024-02-22 RX ORDER — FENTANYL CITRATE 50 UG/ML
INJECTION, SOLUTION INTRAMUSCULAR; INTRAVENOUS PRN
Status: DISCONTINUED | OUTPATIENT
Start: 2024-02-22 | End: 2024-02-22 | Stop reason: SDUPTHER

## 2024-02-22 RX ORDER — METOCLOPRAMIDE HYDROCHLORIDE 5 MG/ML
10 INJECTION INTRAMUSCULAR; INTRAVENOUS
Status: DISCONTINUED | OUTPATIENT
Start: 2024-02-22 | End: 2024-02-22 | Stop reason: HOSPADM

## 2024-02-22 RX ORDER — BUPIVACAINE HYDROCHLORIDE AND EPINEPHRINE 5; 5 MG/ML; UG/ML
INJECTION, SOLUTION EPIDURAL; INTRACAUDAL; PERINEURAL PRN
Status: DISCONTINUED | OUTPATIENT
Start: 2024-02-22 | End: 2024-02-22 | Stop reason: ALTCHOICE

## 2024-02-22 RX ORDER — LABETALOL HYDROCHLORIDE 5 MG/ML
10 INJECTION, SOLUTION INTRAVENOUS
Status: DISCONTINUED | OUTPATIENT
Start: 2024-02-22 | End: 2024-02-22 | Stop reason: HOSPADM

## 2024-02-22 RX ORDER — SODIUM CHLORIDE, SODIUM LACTATE, POTASSIUM CHLORIDE, CALCIUM CHLORIDE 600; 310; 30; 20 MG/100ML; MG/100ML; MG/100ML; MG/100ML
INJECTION, SOLUTION INTRAVENOUS CONTINUOUS
Status: DISCONTINUED | OUTPATIENT
Start: 2024-02-22 | End: 2024-02-22 | Stop reason: HOSPADM

## 2024-02-22 RX ORDER — MORPHINE SULFATE 2 MG/ML
1 INJECTION, SOLUTION INTRAMUSCULAR; INTRAVENOUS EVERY 5 MIN PRN
Status: DISCONTINUED | OUTPATIENT
Start: 2024-02-22 | End: 2024-02-22 | Stop reason: HOSPADM

## 2024-02-22 RX ORDER — LIDOCAINE HYDROCHLORIDE 10 MG/ML
INJECTION, SOLUTION INFILTRATION; PERINEURAL PRN
Status: DISCONTINUED | OUTPATIENT
Start: 2024-02-22 | End: 2024-02-22 | Stop reason: SDUPTHER

## 2024-02-22 RX ADMIN — MIDAZOLAM 2 MG: 1 INJECTION INTRAMUSCULAR; INTRAVENOUS at 07:46

## 2024-02-22 RX ADMIN — PROPOFOL 200 MG: 10 INJECTION, EMULSION INTRAVENOUS at 07:50

## 2024-02-22 RX ADMIN — LIDOCAINE HYDROCHLORIDE 40 MG: 10 INJECTION, SOLUTION INFILTRATION; PERINEURAL at 07:50

## 2024-02-22 RX ADMIN — FENTANYL CITRATE 50 MCG: 50 INJECTION, SOLUTION INTRAMUSCULAR; INTRAVENOUS at 07:50

## 2024-02-22 RX ADMIN — Medication 200 MCG: at 08:05

## 2024-02-22 RX ADMIN — DEXAMETHASONE SODIUM PHOSPHATE 5 MG: 10 INJECTION, SOLUTION INTRAMUSCULAR; INTRAVENOUS at 07:54

## 2024-02-22 RX ADMIN — ONDANSETRON 4 MG: 2 INJECTION INTRAMUSCULAR; INTRAVENOUS at 07:54

## 2024-02-22 RX ADMIN — SODIUM CHLORIDE: 9 INJECTION, SOLUTION INTRAVENOUS at 07:11

## 2024-02-22 RX ADMIN — KETOROLAC TROMETHAMINE 30 MG: 30 INJECTION, SOLUTION INTRAMUSCULAR; INTRAVENOUS at 08:02

## 2024-02-22 RX ADMIN — FENTANYL CITRATE 50 MCG: 50 INJECTION, SOLUTION INTRAMUSCULAR; INTRAVENOUS at 08:16

## 2024-02-22 RX ADMIN — PROPOFOL 100 MG: 10 INJECTION, EMULSION INTRAVENOUS at 07:55

## 2024-02-22 ASSESSMENT — PAIN - FUNCTIONAL ASSESSMENT: PAIN_FUNCTIONAL_ASSESSMENT: 0-10

## 2024-02-22 NOTE — H&P
Office Note     C. José Luis Monreal MD, FACS     Subjective:      Patient ID: Norm Hopson is a 63 y.o. male.     HPI  63yoM here today for a ED follow up DOI 2/14/2024 partial amp right little finger. Pt pinched his finger between weights during PT.  Pt is taking Keflex and Percocet for pain.   Review of Systems     Past Medical History        Past Medical History:   Diagnosis Date    Abnormal EKG       BY HX    Arthritis      Bell palsy      Cardiomyopathy (HCC)      Chest pain      COVID-19 vaccine administered 1-,12-     Pfizer and has received Booster of Pfizer     Diabetes mellitus (HCC)      History of cardiac cath 10/2023    History of stress test 09/2023    Hyperlipidemia      Hypertension      Morbid obesity (HCC)      Sleep apnea       HAD SURGERY         Past Surgical History         Past Surgical History:   Procedure Laterality Date    CARDIAC PROCEDURE N/A 10/30/2023     Left heart cath / coronary angiography performed by Mayank Hazel MD at [facility] CARDIAC CATH LAB    CARDIAC SURGERY         CARDIAC CATH  GREATER THAN 5 YRS AGO    CARPAL TUNNEL RELEASE Right      COLONOSCOPY        COLONOSCOPY N/A 11/11/2021     COLONOSCOPY POLYPECTOMY COLD BIOPSY performed by Kate Douglas MD at [facility] ENDO    ELBOW SURGERY Left 04/25/2016     lt lateral epicondylectomy    EYE SURGERY Right      JOINT REPLACEMENT Bilateral       KNEES    KNEE ARTHROSCOPY Bilateral       2-3x each    TOTAL HIP ARTHROPLASTY Left 12/12/2023     HIP TOTAL ARTHROPLASTY MINIMALLY INVASIVE LEFT ASI performed by Onofre Ford MD at [facility] OR    [facility] UVULOPALATOPHARYGOPLASTY        WISDOM TOOTH EXTRACTION             No Known Allergies  Current Facility-Administered Medications          Current Outpatient Medications   Medication Sig Dispense Refill    montelukast (SINGULAIR) 10 MG tablet Take 1 tablet by mouth nightly        cephALEXin (KEFLEX) 500 MG capsule Take 1 capsule by mouth 4 times daily for 5 days 20 capsule  0    cetirizine (ZYRTEC) 10 MG tablet Take two tablets by mouth in the morning and one tablet at night. Can add an additional tablet at night if control is inadequate 120 tablet 0    EPINEPHrine (EPIPEN 2-KATLIN) 0.3 MG/0.3ML SOAJ injection Inject 0.3 mLs into the muscle once as needed (severe allergic reaction) 0.3 mL 1    famotidine (PEPCID) 20 MG tablet Take 1 tablet by mouth 2 times daily for 5 days 10 tablet 0    aspirin 325 MG tablet Take 1 tablet by mouth daily 30 tablet 3    naproxen sodium (ALEVE) 220 MG tablet Take 1 tablet by mouth 2 times daily (with meals) (Patient not taking: Reported on 1/15/2024)        atorvastatin (LIPITOR) 10 MG tablet          carvedilol (COREG) 6.25 MG tablet Take 1 tablet by mouth 2 times daily (with meals)        Compression Stockings MISC 15-20 mm/hg 2 each 0    hydrochlorothiazide (HYDRODIURIL) 25 MG tablet Take 1 tablet by mouth daily          No current facility-administered medications for this visit.         Social History               Socioeconomic History    Marital status:        Spouse name: Eliana    Number of children: 2    Years of education: Not on file    Highest education level: Not on file   Occupational History    Occupation: sterile processing at [employer]       Employer: [employer]   Tobacco Use    Smoking status: Never    Smokeless tobacco: Never   Vaping Use    Vaping Use: Never used   Substance and Sexual Activity    Alcohol use: Yes       Comment: Socially    Drug use: No    Sexual activity: Not on file   Other Topics Concern    Not on file   Social History Narrative    Not on file      Social Determinants of Health      Financial Resource Strain: Not on file   Food Insecurity: Not on file   Transportation Needs: Not on file   Physical Activity: Not on file   Stress: Not on file   Social Connections: Not on file   Intimate Partner Violence: Not on file   Housing Stability: Not on file         Family History         Family History   Problem

## 2024-02-22 NOTE — ANESTHESIA POSTPROCEDURE EVALUATION
Department of Anesthesiology  Postprocedure Note    Patient: Norm Hopson  MRN: [id number]  YOB: 1960  Date of evaluation: 2/22/2024    Procedure Summary       Date: 02/22/24 Room / Location: [address]    Anesthesia Start: 0747 Anesthesia Stop: 0820    Procedure: RIGHT SMALL FINGER AMPUTATION WITH FLAP CLOSURE (Right) Diagnosis:       Closed nondisplaced fracture of phalanx of right little finger, unspecified phalanx, initial encounter      (Closed nondisplaced fracture of phalanx of right little finger, unspecified phalanx, initial encounter [S62.606A])    Surgeons: Hayder Monreal MD Responsible Provider: Mary Robison MD    Anesthesia Type: general ASA Status: 3            Anesthesia Type: No value filed.    Ximena Phase I: Ximena Score: 10    Ximena Phase II: Ximena Score: 10    Anesthesia Post Evaluation    Patient location during evaluation: PACU  Patient participation: complete - patient participated  Level of consciousness: awake and alert  Airway patency: patent  Nausea & Vomiting: no nausea and no vomiting  Cardiovascular status: blood pressure returned to baseline  Respiratory status: acceptable and room air  Hydration status: euvolemic  Pain management: adequate and satisfactory to patient    No notable events documented.

## 2024-02-22 NOTE — OP NOTE
Operative Note      Patient: Norm Hopson  YOB: 1960  MRN: [id number]    Date of Procedure: 2/22/2024    Pre-Op Diagnosis Codes:     * Closed nondisplaced fracture of phalanx of right little finger, unspecified phalanx, initial encounter [S62.606A]    Post-Op Diagnosis: Same       Procedure(s):  RIGHT SMALL FINGER AMPUTATION WITH FLAP CLOSURE    Surgeon(s):  Hayder Monreal MD    Assistant:   * No surgical staff found *    Anesthesia: General    Estimated Blood Loss (mL): Minimal    Complications: None    Specimens:   ID Type Source Tests Collected by Time Destination   A : GROSS ONLY NECROTIC RIGHT SMALL FINGER TISSUE Tissue Tissue SURGICAL PATHOLOGY Hayder Monreal MD 2/22/2024 0759        Implants:  * No implants in log *      Drains: * No LDAs found *    Findings: Near circumferential laceration to the tip of the right small finger.  The nailbed area was necrotic as was the radial portion of the near circumferential laceration.        Detailed Description of Procedure:   With the patient supine and general anesthesia induced via posterior pharyngeal LMA intubation distal metacarpal digital block half percent Marcaine 1-200,000 epinephrine was performed.  After sterile prep and drape and appropriate timeout the finger was evaluated.  If turnicot was placed on the finger.  Using 2.5 power loupe magnification the finger nailbed was explored and the lacerated material was nonviable.  The entire nailbed was excised in an oval fashion down to bone.  The sutures from the repair were removed.  The distal portion of bone that was seen on the x-ray was not attached to anything and was removed.  The necrotic material was removed on the radial side of the distal laceration.  The bone was rounded with a rongeur.  A radial proximal backcut of the laceration was performed allowing rotation of the volar flap distally and dorsally and ulnarly.  A 3-0 Prolene was placed as a pivot suture on the ulnar  portion of the closure.  The flap was then designed with gentian violet and incised as designed to tailored to fit.  Interrupted 3-0 Prolene were used to close in the flap was with blood supply and capillary refill at the termination of the case.  Adaptic bacitracin and a sterile dressing applied.  The size of the defect was approximately 3.5 cm².  The flap covered approximately 3 cm² and the remainder was closed with local tissue advancement.    Electronically signed by Hayder Monreal MD on 2/22/2024 at 8:12 AM

## 2024-02-22 NOTE — ANESTHESIA PRE PROCEDURE
Department of Anesthesiology  Preprocedure Note       Name:  Norm Hopson   Age:  63 y.o.  :  1960                                          MRN:  [id number]         Date:  2024      Surgeon: Surgeon(s):  Hayder Monreal MD    Procedure: Procedure(s):  RIGHT SMALL FINGER OPEN REDUCTION INTERNAL FIXATION VERSUS CLOSED REDUCTION PERCUTANEOUS PINNING possible RIGHT SMALL FINGER AMPUTATION WITH FLAP CLOSURE  possible RIGHT SMALL FINGER AMPUTATION WITH FLAP CLOSURE    Medications prior to admission:   Prior to Admission medications    Medication Sig Start Date End Date Taking? Authorizing Provider   montelukast (SINGULAIR) 10 MG tablet Take 1 tablet by mouth nightly    Rocio Dover MD   cetirizine (ZYRTEC) 10 MG tablet Take two tablets by mouth in the morning and one tablet at night. Can add an additional tablet at night if control is inadequate 1/10/24   Tahira Coppola MD   EPINEPHrine (EPIPEN 2-KATLIN) 0.3 MG/0.3ML SOAJ injection Inject 0.3 mLs into the muscle once as needed (severe allergic reaction) 23  Omi Frazier MD   famotidine (PEPCID) 20 MG tablet Take 1 tablet by mouth 2 times daily for 5 days  Patient taking differently: Take 2 tablets by mouth 2 times daily 23  Omi Frazier MD   aspirin 325 MG tablet Take 1 tablet by mouth daily 23   Onofre Ford MD   naproxen sodium (ALEVE) 220 MG tablet Take 1 tablet by mouth 2 times daily (with meals)  Patient not taking: Reported on 1/15/2024    Rocio Dover MD   carvedilol (COREG) 6.25 MG tablet Take 1 tablet by mouth 2 times daily (with meals)    Rocio Dover MD   Compression Stockings MISC 15-20 mm/hg 3/23/22   Jennifer Mejia DPM   hydrochlorothiazide (HYDRODIURIL) 25 MG tablet Take 1 tablet by mouth daily    Rocio Dover MD       Current medications:    Current Facility-Administered Medications   Medication Dose Route Frequency Provider Last Rate Last Admin   • 0.9 %

## 2024-02-23 ENCOUNTER — OFFICE VISIT (OUTPATIENT)
Dept: ORTHOPEDIC SURGERY | Age: 64
End: 2024-02-23

## 2024-02-23 ENCOUNTER — CARE COORDINATION (OUTPATIENT)
Dept: OTHER | Facility: CLINIC | Age: 64
End: 2024-02-23

## 2024-02-23 DIAGNOSIS — Z96.642 STATUS POST LEFT HIP REPLACEMENT: Primary | ICD-10-CM

## 2024-02-23 PROCEDURE — 99024 POSTOP FOLLOW-UP VISIT: CPT | Performed by: ORTHOPAEDIC SURGERY

## 2024-02-23 NOTE — PROGRESS NOTES
Patient returns today status post left total hip on 12/12/2023.  Patient as states that his hip overall is doing well he states he has minimal to no pain.  He has been following with wound care he has 1 small area on the superior aspect of his incision that is been receiving local wound care and is closing he has no symptoms of infection he denies any drainage process and overall has been doing well in this regard    Unfortunately patient had injury of his right small finger when he got smashed in a workout machine and ended up having a partial amputation yesterday per Dr. Noriega    Examination notes the patient has no pain whatsoever motion his hip.  His wound is otherwise pristine except for the superiormost 1-1/2 cm which has just a delayed closure there is no active purulence or drainage or redness whatsoever.  No active fluid collection and overall doing well    No new x-rays taken today    Impression  Status post left total hip 12/12/2023.  Delayed wound closure but doing well with wound care    Plan  Patient to continue his own therapy at this point as he with his recent hand injury that is required to be careful and do any lifting or pushing work to see the patient back in 4 weeks before determining rate return to work status at which time he may have more information regarding his hand.

## 2024-02-23 NOTE — CARE COORDINATION
ACM contacted the patient to follow up on progress, discuss new issues or concerns, and reinforce/provide patient education.     Summary Note: ACM called patient for follow up after partial finger amputation yesterday. Patient reports surgeon removed portion of finger below the nail. Post op dressing clean, dry, intact per patient's report. He is aware to keep dressing intact until follow up with Dr. Monreal on 3/4/24. Patient home with Percocet and Augmentin. He is aware to complete Augmentin script. He has taken 2 Percocet since home. Currently rates pain \"3-4\"/10. ACM discussed possible side effect of constipation with Percocet and suggested he take OTC stool softener PRN. Patient states does not anticipate needing the Percocet for many more doses.     Patient had follow up with Dr. Ford today. [facility] provider said left hip site looks good. [facility] wife performs wound care daily to site with Yolie and dressing. He verified has enough wound care supplies currently. Has next wound care OV next week. States he had been driving before surgery yesterday. He is aware no driving until after follow up. States hoping to return to work in April. Denies immediate needs or concerns for ACM. Agreeable to follow up calls.     Reinforced/Provided Education:  Discussed red flags and appropriate site of care based on symptoms and resources available to patient including: PCP  Specialist.   Provided the following associate/dependent related resources:  None this date    Importance and benefits of: Follow up with PCP and specialist, medication adherence, self monitoring and reporting of symptoms.      Plan:  Plan for follow-up call in 7-10 days based on severity of symptoms and risk factors.  Plan for next call:  reassess for needs    Patient  verbalized understanding and is agreeable to follow up call.       Future Appointments   Date Time Provider Department Center   3/1/2024  9:30 AM Diallo Rosa MD [facility]

## 2024-02-26 ENCOUNTER — TELEPHONE (OUTPATIENT)
Dept: ORTHOPEDIC SURGERY | Age: 64
End: 2024-02-26

## 2024-02-26 LAB
GLUCOSE BLD-MCNC: 124 MG/DL (ref 75–110)
SURGICAL PATHOLOGY REPORT: NORMAL

## 2024-02-26 NOTE — TELEPHONE ENCOUNTER
The patient currently out of work until 3/15/23.  He had left hip ANGELA on 12/2/23.  He called today asking for his time off to be extended until 3/24/24.    Please advise.

## 2024-02-27 NOTE — DISCHARGE INSTRUCTIONS
room.     The information contained in the After Visit Summary has been reviewed with me, the patient and/or responsible adult, by my health care provider(s). I had the opportunity to ask questions regarding this information. I have elected to receive;      []After Visit Summary  [x]Comprehensive Discharge Instruction        Patient signature______________________________________Date:___  Electronically signed by Rocío Coffman RN on 3/1/2024 at 9:34 AM  Electronically signed by Diallo Rosa MD on 3/1/2024 at 9:55 AM

## 2024-03-01 ENCOUNTER — HOSPITAL ENCOUNTER (OUTPATIENT)
Dept: WOUND CARE | Age: 64
Discharge: HOME | End: 2024-03-01
Payer: COMMERCIAL

## 2024-03-01 VITALS
HEART RATE: 88 BPM | WEIGHT: 315 LBS | HEIGHT: 68 IN | SYSTOLIC BLOOD PRESSURE: 143 MMHG | TEMPERATURE: 98.3 F | BODY MASS INDEX: 47.74 KG/M2 | RESPIRATION RATE: 18 BRPM | DIASTOLIC BLOOD PRESSURE: 91 MMHG

## 2024-03-01 DIAGNOSIS — S71.009D: Primary | ICD-10-CM

## 2024-03-01 PROCEDURE — 11042 DBRDMT SUBQ TIS 1ST 20SQCM/<: CPT

## 2024-03-01 RX ORDER — LIDOCAINE HYDROCHLORIDE 20 MG/ML
JELLY TOPICAL ONCE
OUTPATIENT
Start: 2024-03-01 | End: 2024-03-01

## 2024-03-01 RX ORDER — LIDOCAINE 50 MG/G
OINTMENT TOPICAL ONCE
OUTPATIENT
Start: 2024-03-01 | End: 2024-03-01

## 2024-03-01 RX ORDER — SODIUM CHLOR/HYPOCHLOROUS ACID 0.033 %
SOLUTION, IRRIGATION IRRIGATION ONCE
OUTPATIENT
Start: 2024-03-01 | End: 2024-03-01

## 2024-03-01 RX ORDER — LIDOCAINE HYDROCHLORIDE 40 MG/ML
SOLUTION TOPICAL ONCE
OUTPATIENT
Start: 2024-03-01 | End: 2024-03-01

## 2024-03-01 RX ORDER — BACITRACIN ZINC AND POLYMYXIN B SULFATE 500; 1000 [USP'U]/G; [USP'U]/G
OINTMENT TOPICAL ONCE
OUTPATIENT
Start: 2024-03-01 | End: 2024-03-01

## 2024-03-01 RX ORDER — LIDOCAINE 40 MG/G
CREAM TOPICAL ONCE
OUTPATIENT
Start: 2024-03-01 | End: 2024-03-01

## 2024-03-01 RX ORDER — BETAMETHASONE DIPROPIONATE 0.5 MG/G
CREAM TOPICAL ONCE
OUTPATIENT
Start: 2024-03-01 | End: 2024-03-01

## 2024-03-01 RX ORDER — CLOBETASOL PROPIONATE 0.5 MG/G
OINTMENT TOPICAL ONCE
OUTPATIENT
Start: 2024-03-01 | End: 2024-03-01

## 2024-03-01 RX ORDER — GENTAMICIN SULFATE 1 MG/G
OINTMENT TOPICAL ONCE
OUTPATIENT
Start: 2024-03-01 | End: 2024-03-01

## 2024-03-01 RX ORDER — TRIAMCINOLONE ACETONIDE 1 MG/G
OINTMENT TOPICAL ONCE
OUTPATIENT
Start: 2024-03-01 | End: 2024-03-01

## 2024-03-01 RX ORDER — LIDOCAINE HYDROCHLORIDE 40 MG/ML
SOLUTION TOPICAL ONCE
Status: COMPLETED | OUTPATIENT
Start: 2024-03-01 | End: 2024-03-01

## 2024-03-01 RX ORDER — GINSENG 100 MG
CAPSULE ORAL ONCE
OUTPATIENT
Start: 2024-03-01 | End: 2024-03-01

## 2024-03-01 RX ORDER — IBUPROFEN 200 MG
TABLET ORAL ONCE
OUTPATIENT
Start: 2024-03-01 | End: 2024-03-01

## 2024-03-01 RX ADMIN — LIDOCAINE HYDROCHLORIDE 5 ML: 40 SOLUTION TOPICAL at 09:34

## 2024-03-01 ASSESSMENT — PAIN SCALES - GENERAL: PAINLEVEL_OUTOF10: 0

## 2024-03-01 NOTE — PROGRESS NOTES
[facility]   Progress Note and Procedure Note      Norm Hopson  MEDICAL RECORD NUMBER:  [id number]  AGE: 63 y.o.   GENDER: male  : 1960  EPISODE DATE:  3/1/2024    Subjective:     Chief Complaint   Patient presents with    Wound Check     Left hip         HISTORY of PRESENT ILLNESS HPI     Norm Hopson is a 63 y.o. male who presents today for wound/ulcer evaluation.   History of Wound Context: The patient is here for right left lateral hip wound at recent surgical incision.  No purulent drainage, no surrounding redness.  He had left hip arthroplasty done 2023.  The wound was healing well until 2025, the surgical incision opened and had mild amount of drainage, was evaluated at the ER on 2024.  He has been following with orthopedic surgeon    He was seen at the ER on 2024 for right small finger crush injury by physical therapy equipment.  It was irrigated and repaired, tetanus updated, subsequently had partial amputation on 2024  Ulcer Identification:  Ulcer Type: non-healing surgical    Contributing Factors: diabetes and obesity    Acute Wound: N/A    PAST MEDICAL HISTORY        Diagnosis Date    Abnormal EKG     BY HX    Arthritis     Bell palsy     Cardiomyopathy (HCC)     Chest pain     COVID-19 vaccine administered 2021,2020    Pfizer and has received Booster of Pfizer     Diabetes mellitus (HCC)     History of cardiac cath 10/2023    History of stress test 2023    Hyperlipidemia     Hypertension     Morbid obesity (HCC)     Sleep apnea     HAD SURGERY       PAST SURGICAL HISTORY    Past Surgical History:   Procedure Laterality Date    CARDIAC PROCEDURE N/A 10/30/2023    Left heart cath / coronary angiography performed by Mayank Hazel MD at [facility] CARDIAC CATH LAB    CARDIAC SURGERY      CARDIAC CATH  GREATER THAN 5 YRS AGO    CARPAL TUNNEL RELEASE Right     COLONOSCOPY      COLONOSCOPY N/A 2021    COLONOSCOPY POLYPECTOMY

## 2024-03-06 ENCOUNTER — CARE COORDINATION (OUTPATIENT)
Dept: OTHER | Facility: CLINIC | Age: 64
End: 2024-03-06

## 2024-03-06 NOTE — CARE COORDINATION
Ambulatory Care Coordination Note  3/6/2024    Patient Current Location:  Home: [address] [address]     ACM contacted the patient by telephone. Verified name and  with patient as identifiers. Provided introduction to self, and explanation of the ACM role.     Challenges to be reviewed by the provider   Additional needs identified to be addressed with provider: No         Method of communication with provider: none.    ACM: Ginny Hernandez RN    ACM contacted the patient to follow up on progress, discuss new issues or concerns, and reinforce/provide patient education.     Summary Note: Patient states he is healing very well from his finger amputation.  He has another appt with wound care this week for his hip and hoping to be released.  He should be released to RTW on 3/25/24 if his finger is healing well also.  He states he was just started on antibiotics again for his finger after his appt.  He states he has been having an issue with hives ever since his hip surgery and they are back again. He has seen an allergist and is taking, Pepcid, Singulair, and zyrtec as directed, but continues to have hives.  He states he does have an other appt with the allergist at the end of march, but not sure of the date at the time of the call.  ACM recommended pt contact the allergist office and advise of ongoing symptoms and possibly move the appt if needed.  Pt verbalized understanding Pt denies ay other immediate ACM needs and is agreeable to follow up contact.     Reinforced/Provided Education:  Discussed red flags and appropriate site of care based on symptoms and resources available to patient including: PCP  Specialist  Benefits related nurse triage line  Urgent care clinics  MyChart Messaging.   Provided the following associate/dependent related resources:     Importance and benefits of: Follow up with PCP and specialist, medication adherence, self monitoring and reporting of symptoms.      Plan:  Plan for

## 2024-03-07 NOTE — DISCHARGE INSTRUCTIONS
[facility] WOUND and HYPERBARIC TREATMENT  CENTER                            Visit  Discharge Instructions / Physician Orders  DATE:3/8/24     Home Care:     SUPPLIES ORDERED THRU:     [employer]    DATE LAST SUPPLIED 1/15/24 (Yolie and Excel SAP 5x7 dressings ordered)     Wound Location:  Left Hip (surgery 1/5/2023/Dr. Ford)     Cleanse with: Liquid antibacterial soap and water, rinse well      Dressing Orders:  Primary dressing  Fibracol to wound    Secondary dressing  Cover with small piece of gauze and Silicone border dressing                         secure with           x 30days     Frequency:  Daily (may do every other day if not showering that day)     Additional Orders: Increase protein to diet (meat, cheese, eggs, fish, peanut butter, nuts and beans)  Multivitamin daily     OFFLOADING [] YES  TYPE:                  [x] NA     Weekly wound care visits until determined otherwise.     Antibiotic therapy-wound care related YES [] NO [] NA[x]     MY CHART []     Smart Device  []      HYPERBARIC TREATMENT-                TREATMENT #                          Your next appointment with the Wound Care Center is in 1 week                                                                                                   (Please note your next appointment above and if you are unable to keep, kindly give a 24 hour notice. Thank you.)  If more than 15 min late we cannot guarantee you will be seen due to clinician schedule  Per Policy, Excessive cancellation will call for dismissal from program.  If you experience any of the following, please call the Wound Care Center during business hours:  [phone]     * Increase in Pain  * Temperature over 101  * Increase in drainage from your wound  * Drainage with a foul odor  * Bleeding  * Increase in swelling  * Need for compression bandage changes due to slippage, breakthrough drainage.     If you need medical attention outside of the business hours of the Wound

## 2024-03-08 ENCOUNTER — HOSPITAL ENCOUNTER (OUTPATIENT)
Dept: WOUND CARE | Age: 64
Discharge: HOME | End: 2024-03-08
Payer: COMMERCIAL

## 2024-03-08 VITALS
BODY MASS INDEX: 47.74 KG/M2 | RESPIRATION RATE: 16 BRPM | TEMPERATURE: 98.5 F | HEART RATE: 91 BPM | SYSTOLIC BLOOD PRESSURE: 154 MMHG | HEIGHT: 68 IN | DIASTOLIC BLOOD PRESSURE: 99 MMHG | WEIGHT: 315 LBS

## 2024-03-08 DIAGNOSIS — S71.009D: Primary | ICD-10-CM

## 2024-03-08 PROCEDURE — 11042 DBRDMT SUBQ TIS 1ST 20SQCM/<: CPT

## 2024-03-08 RX ORDER — TRIAMCINOLONE ACETONIDE 1 MG/G
OINTMENT TOPICAL ONCE
OUTPATIENT
Start: 2024-03-08 | End: 2024-03-08

## 2024-03-08 RX ORDER — GENTAMICIN SULFATE 1 MG/G
OINTMENT TOPICAL ONCE
OUTPATIENT
Start: 2024-03-08 | End: 2024-03-08

## 2024-03-08 RX ORDER — LIDOCAINE HYDROCHLORIDE 20 MG/ML
JELLY TOPICAL ONCE
OUTPATIENT
Start: 2024-03-08 | End: 2024-03-08

## 2024-03-08 RX ORDER — IBUPROFEN 200 MG
TABLET ORAL ONCE
OUTPATIENT
Start: 2024-03-08 | End: 2024-03-08

## 2024-03-08 RX ORDER — LIDOCAINE 50 MG/G
OINTMENT TOPICAL ONCE
OUTPATIENT
Start: 2024-03-08 | End: 2024-03-08

## 2024-03-08 RX ORDER — BETAMETHASONE DIPROPIONATE 0.5 MG/G
CREAM TOPICAL ONCE
OUTPATIENT
Start: 2024-03-08 | End: 2024-03-08

## 2024-03-08 RX ORDER — GINSENG 100 MG
CAPSULE ORAL ONCE
OUTPATIENT
Start: 2024-03-08 | End: 2024-03-08

## 2024-03-08 RX ORDER — CLOBETASOL PROPIONATE 0.5 MG/G
OINTMENT TOPICAL ONCE
OUTPATIENT
Start: 2024-03-08 | End: 2024-03-08

## 2024-03-08 RX ORDER — LIDOCAINE HYDROCHLORIDE 20 MG/ML
JELLY TOPICAL ONCE
Status: DISCONTINUED | OUTPATIENT
Start: 2024-03-08 | End: 2024-03-09 | Stop reason: HOSPADM

## 2024-03-08 RX ORDER — LIDOCAINE 40 MG/G
CREAM TOPICAL ONCE
OUTPATIENT
Start: 2024-03-08 | End: 2024-03-08

## 2024-03-08 RX ORDER — BACITRACIN ZINC AND POLYMYXIN B SULFATE 500; 1000 [USP'U]/G; [USP'U]/G
OINTMENT TOPICAL ONCE
OUTPATIENT
Start: 2024-03-08 | End: 2024-03-08

## 2024-03-08 RX ORDER — SODIUM CHLOR/HYPOCHLOROUS ACID 0.033 %
SOLUTION, IRRIGATION IRRIGATION ONCE
OUTPATIENT
Start: 2024-03-08 | End: 2024-03-08

## 2024-03-08 RX ORDER — LIDOCAINE HYDROCHLORIDE 40 MG/ML
SOLUTION TOPICAL ONCE
OUTPATIENT
Start: 2024-03-08 | End: 2024-03-08

## 2024-03-08 ASSESSMENT — PAIN SCALES - GENERAL: PAINLEVEL_OUTOF10: 0

## 2024-03-08 NOTE — PROGRESS NOTES
[facility]   Progress Note and Procedure Note      Norm Hopson  MEDICAL RECORD NUMBER:  [id number]  AGE: 63 y.o.   GENDER: male  : 1960  EPISODE DATE:  3/8/2024    Subjective:     Chief Complaint   Patient presents with    Wound Check     Left Hip         HISTORY of PRESENT ILLNESS HPI     Norm Hopson is a 63 y.o. male who presents today for wound/ulcer evaluation.   History of Wound Context: The patient is here for right left lateral hip wound that is improving with no purulent drainage, no surrounding redness.  He denied fever or chills, no other complaints.  He had left hip arthroplasty done 2023.  The wound was healing well until 2025, the surgical incision opened and had mild amount of drainage, was evaluated at the ER on 2024.  He has been following with orthopedic surgeon    He was seen at the ER on 2024 for right small finger crush injury by physical therapy equipment.  It was irrigated and repaired, tetanus updated, subsequently had partial amputation on 2024  Ulcer Identification:  Ulcer Type: non-healing surgical    Contributing Factors: diabetes and obesity    Acute Wound: N/A    PAST MEDICAL HISTORY        Diagnosis Date    Abnormal EKG     BY HX    Arthritis     Bell palsy     Cardiomyopathy (HCC)     Chest pain     COVID-19 vaccine administered 2021,2020    Pfizer and has received Booster of Pfizer     Diabetes mellitus (HCC)     History of cardiac cath 10/2023    History of stress test 2023    Hyperlipidemia     Hypertension     Morbid obesity (HCC)     Sleep apnea     HAD SURGERY       PAST SURGICAL HISTORY    Past Surgical History:   Procedure Laterality Date    CARDIAC PROCEDURE N/A 10/30/2023    Left heart cath / coronary angiography performed by Mayank Hazel MD at [facility] CARDIAC CATH LAB    CARDIAC SURGERY      CARDIAC CATH  GREATER THAN 5 YRS AGO    CARPAL TUNNEL RELEASE Right     COLONOSCOPY      COLONOSCOPY

## 2024-03-13 ENCOUNTER — CARE COORDINATION (OUTPATIENT)
Dept: OTHER | Facility: CLINIC | Age: 64
End: 2024-03-13

## 2024-03-13 NOTE — DISCHARGE INSTRUCTIONS
[facility] WOUND and HYPERBARIC TREATMENT  CENTER                            Visit  Discharge Instructions / Physician Orders  DATE:3/15/24     Home Care:     SUPPLIES ORDERED THRU:     [employer]    DATE LAST SUPPLIED 1/15/24 (Yolie and Excel SAP 5x7 dressings ordered)     Wound Location:  Left Hip (surgery 1/5/2023/Dr. Ford)     Cleanse with: Liquid antibacterial soap and water, rinse well      Dressing Orders:  Primary dressing  Fibracol to wound    Secondary dressing  Cover with small piece of gauze and Silicone border dressing                         secure with           x 30days     Frequency:  Daily (may do every other day if not showering that day)     Additional Orders: Increase protein to diet (meat, cheese, eggs, fish, peanut butter, nuts and beans)  Multivitamin daily     OFFLOADING [] YES  TYPE:                  [x] NA     Weekly wound care visits until determined otherwise.     Antibiotic therapy-wound care related YES [] NO [] NA[x]     MY CHART []     Smart Device  []      HYPERBARIC TREATMENT-                TREATMENT #                          Your next appointment with the Wound Care Center is in 1 week                                                                                                   (Please note your next appointment above and if you are unable to keep, kindly give a 24 hour notice. Thank you.)  If more than 15 min late we cannot guarantee you will be seen due to clinician schedule  Per Policy, Excessive cancellation will call for dismissal from program.  If you experience any of the following, please call the Wound Care Center during business hours:  [phone]     * Increase in Pain  * Temperature over 101  * Increase in drainage from your wound  * Drainage with a foul odor  * Bleeding  * Increase in swelling  * Need for compression bandage changes due to slippage, breakthrough drainage.     If you need medical attention outside of the business hours of the Wound

## 2024-03-13 NOTE — CARE COORDINATION
Ambulatory Care Coordination Note  3/13/2024    Patient Current Location:  Home: [address] [address]     ACM contacted the patient by telephone. Verified name and  with patient as identifiers.   Challenges to be reviewed by the provider   Additional needs identified to be addressed with provider: No  none         Method of communication with provider: none.    ACM: Ginny Hernandez RN    ACM contacted the patient to follow up on progress, discuss new issues or concerns, and reinforce/provide patient education.     Summary Note: Patient states his hives seem to get worse for  a few days and then over the weekend seemed to improve.  He has an appt today with the allergist at 4:00 pm.  He feels his finger and hip are both healing well and is still planning on RTW on 3/25/24.  Pt is agreeable to follow up contact after allergist appt to discuss recommendations and plan of care.     Reinforced/Provided Education:  Discussed red flags and appropriate site of care based on symptoms and resources available to patient including: PCP  Specialist  Benefits related nurse triage line  Urgent care clinics  MyChart Messaging.     Importance and benefits of: Follow up with PCP and specialist, medication adherence, self monitoring and reporting of symptoms.      Plan:  Plan for follow-up call in 7-10 days based on severity of symptoms and risk factors.  Plan for next call: follow-up appointment-discus progress and plan of care    Patient  verbalized understanding and is agreeable to follow up call.          Care Coordination Interventions    Referral from Primary Care Provider: No  Suggested Interventions and Community Resources  Physical Therapy: Completed  Specialty Services Referral: Completed (Comment: Established wioith surgeon, wound care, and Allergist)          Goals Addressed    None         Future Appointments   Date Time Provider Department Center   3/15/2024  9:00 AM Diallo Rosa MD [facility] WND CAR [facility]

## 2024-03-15 ENCOUNTER — HOSPITAL ENCOUNTER (OUTPATIENT)
Dept: WOUND CARE | Age: 64
Discharge: HOME | End: 2024-03-15

## 2024-03-15 ENCOUNTER — TELEPHONE (OUTPATIENT)
Dept: WOUND CARE | Age: 64
End: 2024-03-15

## 2024-03-15 NOTE — TELEPHONE ENCOUNTER
Patient's wife calling and canceling [facility] wound care apt today stating that he was not feeling well and was rescheduled for 3-22-24

## 2024-03-19 ENCOUNTER — CARE COORDINATION (OUTPATIENT)
Dept: OTHER | Facility: CLINIC | Age: 64
End: 2024-03-19

## 2024-03-19 NOTE — CARE COORDINATION
Ambulatory Care Coordination Note  3/19/2024    Patient Current Location:  Home: [address] [address]     ACM contacted the patient by telephone. Verified name and  with patient as identifiers.     Challenges to be reviewed by the provider   Additional needs identified to be addressed with provider: Yes  Discuss return to work date           Method of communication with provider: none.    ACM: Ginny Hernandez RN    ACM contacted the patient to follow up on progress, discuss new issues or concerns, and reinforce/provide patient education.     Summary Note: Patient states he hs broken out in hives again.  He states he did see the allergist and it was felt it was not the medication causing his symptoms.  He states his symptoms had mostly resolved by the time her saw the doctor.  Discussed taking pictures and keeping a log of symptoms , foods, and any dressing changes etc when he has an outbreak t be able to provide more info to the doctor at the time of his visits.  Pt verbalized understanding and os agreeable. He will see his hip surgeon and finger surgeon this week and is hoping to be able to RTW.  Pt states he is not sure if he will be able to with his finger as he still has sutures and it is not healing as fast as it should.  He denies any immediate ACM needs and is agreeable to follow up contact.     Reinforced/Provided Education:  Discussed red flags and appropriate site of care based on symptoms and resources available to patient including: PCP  Specialist  Benefits related nurse triage line  Urgent care clinics  MyChart Messaging.     Importance and benefits of: Follow up with PCP and specialist, medication adherence, self monitoring and reporting of symptoms.      Plan:  Plan for follow-up call in 3-5 days based on severity of symptoms and risk factors.  Plan for next call: follow-up appointment-Radha progress/ plan of care/ RTW date    Patient  verbalized understanding and is agreeable to

## 2024-03-20 ENCOUNTER — OFFICE VISIT (OUTPATIENT)
Dept: ORTHOPEDIC SURGERY | Age: 64
End: 2024-03-20

## 2024-03-20 DIAGNOSIS — Z96.642 STATUS POST LEFT HIP REPLACEMENT: Primary | ICD-10-CM

## 2024-03-20 PROCEDURE — 99024 POSTOP FOLLOW-UP VISIT: CPT | Performed by: ORTHOPAEDIC SURGERY

## 2024-03-20 NOTE — PROGRESS NOTES
Zuhair returns today he is status post left total hip on 12/12/2023.  He is basically just here to have a wound check.  He goes to wound care once a week and is get a wound at this point closing on its own very nicely.  Patient denies any problems with pain in his hip he is get around very well    Examination notes that he has a small lesion superior aspect it is approximately just dime size no drainage or redness whatsoever looks very benign remainder incisions pristine    Impression  Status post left total hip 12/12/2023  Small area delayed wound healing but doing well with wound care    Plan  Patient continue wound care and monitoring the hip wound.  Call with any problems with this otherwise I like to recheck him back here in 1 month

## 2024-03-22 ENCOUNTER — HOSPITAL ENCOUNTER (OUTPATIENT)
Dept: WOUND CARE | Age: 64
Discharge: HOME | End: 2024-03-22
Payer: COMMERCIAL

## 2024-03-22 VITALS
HEART RATE: 89 BPM | BODY MASS INDEX: 47.74 KG/M2 | WEIGHT: 315 LBS | RESPIRATION RATE: 16 BRPM | SYSTOLIC BLOOD PRESSURE: 167 MMHG | TEMPERATURE: 97.8 F | HEIGHT: 68 IN | DIASTOLIC BLOOD PRESSURE: 101 MMHG

## 2024-03-22 DIAGNOSIS — S71.009D: Primary | ICD-10-CM

## 2024-03-22 PROCEDURE — 11042 DBRDMT SUBQ TIS 1ST 20SQCM/<: CPT

## 2024-03-22 RX ORDER — BETAMETHASONE DIPROPIONATE 0.5 MG/G
CREAM TOPICAL ONCE
OUTPATIENT
Start: 2024-03-22 | End: 2024-03-22

## 2024-03-22 RX ORDER — CLOBETASOL PROPIONATE 0.5 MG/G
OINTMENT TOPICAL ONCE
OUTPATIENT
Start: 2024-03-22 | End: 2024-03-22

## 2024-03-22 RX ORDER — GINSENG 100 MG
CAPSULE ORAL ONCE
OUTPATIENT
Start: 2024-03-22 | End: 2024-03-22

## 2024-03-22 RX ORDER — BACITRACIN ZINC AND POLYMYXIN B SULFATE 500; 1000 [USP'U]/G; [USP'U]/G
OINTMENT TOPICAL ONCE
OUTPATIENT
Start: 2024-03-22 | End: 2024-03-22

## 2024-03-22 RX ORDER — SODIUM CHLOR/HYPOCHLOROUS ACID 0.033 %
SOLUTION, IRRIGATION IRRIGATION ONCE
OUTPATIENT
Start: 2024-03-22 | End: 2024-03-22

## 2024-03-22 RX ORDER — TRIAMCINOLONE ACETONIDE 1 MG/G
OINTMENT TOPICAL ONCE
OUTPATIENT
Start: 2024-03-22 | End: 2024-03-22

## 2024-03-22 RX ORDER — GENTAMICIN SULFATE 1 MG/G
OINTMENT TOPICAL ONCE
OUTPATIENT
Start: 2024-03-22 | End: 2024-03-22

## 2024-03-22 RX ORDER — LIDOCAINE HYDROCHLORIDE 40 MG/ML
SOLUTION TOPICAL ONCE
Status: COMPLETED | OUTPATIENT
Start: 2024-03-22 | End: 2024-03-22

## 2024-03-22 RX ORDER — LIDOCAINE HYDROCHLORIDE 20 MG/ML
JELLY TOPICAL ONCE
OUTPATIENT
Start: 2024-03-22 | End: 2024-03-22

## 2024-03-22 RX ORDER — LIDOCAINE 40 MG/G
CREAM TOPICAL ONCE
OUTPATIENT
Start: 2024-03-22 | End: 2024-03-22

## 2024-03-22 RX ORDER — LIDOCAINE HYDROCHLORIDE 40 MG/ML
SOLUTION TOPICAL ONCE
OUTPATIENT
Start: 2024-03-22 | End: 2024-03-22

## 2024-03-22 RX ORDER — LIDOCAINE 50 MG/G
OINTMENT TOPICAL ONCE
OUTPATIENT
Start: 2024-03-22 | End: 2024-03-22

## 2024-03-22 RX ORDER — IBUPROFEN 200 MG
TABLET ORAL ONCE
OUTPATIENT
Start: 2024-03-22 | End: 2024-03-22

## 2024-03-22 RX ADMIN — LIDOCAINE HYDROCHLORIDE 5 ML: 40 SOLUTION TOPICAL at 09:12

## 2024-03-22 ASSESSMENT — PAIN SCALES - GENERAL: PAINLEVEL_OUTOF10: 0

## 2024-03-22 NOTE — PLAN OF CARE
Problem: Chronic Conditions and Co-morbidities  Goal: Patient's chronic conditions and co-morbidity symptoms are monitored and maintained or improved  Outcome: Progressing     Problem: ABCDS Injury Assessment  Goal: Absence of physical injury  Outcome: Progressing     Problem: Wound:  Goal: Will show signs of wound healing; wound closure and no evidence of infection  Description: Will show signs of wound healing; wound closure and no evidence of infection  Outcome: Progressing     Problem: Falls - Risk of:  Goal: Will remain free from falls  Description: Will remain free from falls  Outcome: Progressing

## 2024-03-22 NOTE — PROGRESS NOTES
Response to treatment:  Well tolerated by patient.       Plan:     Treatment Note please see attached Discharge Instructions    Written patient dismissal instructions given to patient and signed by patient or POA.         Discharge Instructions           [facility] WOUND and HYPERBARIC TREATMENT  CENTER                            Visit  Discharge Instructions / Physician Orders  DATE:3/15/24     Home Care:     SUPPLIES ORDERED THRU:     [email or web address]    DATE LAST SUPPLIED 1/15/24 (Yolie and Excel SAP 5x7 dressings ordered)     Wound Location:  Left Hip (surgery 1/5/2023/Dr. Ford)     Cleanse with: Liquid antibacterial soap and water, rinse well      Dressing Orders:  Primary dressing  Fibracol to wound    Secondary dressing  Cover with small piece of gauze and Silicone border dressing                         secure with           x 30days     Frequency:  Daily (may do every other day if not showering that day)     Additional Orders: Increase protein to diet (meat, cheese, eggs, fish, peanut butter, nuts and beans)  Multivitamin daily     OFFLOADING [] YES  TYPE:                  [x] NA     Weekly wound care visits until determined otherwise.     Antibiotic therapy-wound care related YES [] NO [] NA[x]     MY CHART []     Smart Device  []      HYPERBARIC TREATMENT-                TREATMENT #                          Your next appointment with the Wound Care Center is in 1 week                                                                                                   (Please note your next appointment above and if you are unable to keep, kindly give a 24 hour notice. Thank you.)  If more than 15 min late we cannot guarantee you will be seen due to clinician schedule  Per Policy, Excessive cancellation will call for dismissal from program.  If you experience any of the following, please call the Wound Care Center during business hours:  [phone]     * Increase in Pain  * Temperature over 101  *

## 2024-03-22 NOTE — DISCHARGE INSTRUCTIONS
[facility] WOUND and HYPERBARIC TREATMENT  CENTER                            Visit  Discharge Instructions / Physician Orders  DATE:3/22/24     Home Care:     SUPPLIES ORDERED THRU:     [employer]    DATE LAST SUPPLIED 1/15/24 (Yolie and Excel SAP 5x7 dressings ordered)     Wound Location:  Left Hip (surgery 1/5/2023/Dr. Ford)     Cleanse with: Liquid antibacterial soap and water, rinse well      Dressing Orders:  Primary dressing  Fibracol to wound    Secondary dressing  Cover with small piece of gauze and Silicone border dressing                         secure with           x 30days     Frequency:  Daily (may do every other day if not showering that day)     Additional Orders: Increase protein to diet (meat, cheese, eggs, fish, peanut butter, nuts and beans)  Multivitamin daily     OFFLOADING [] YES  TYPE:                  [x] NA     Weekly wound care visits until determined otherwise.     Antibiotic therapy-wound care related YES [] NO [] NA[x]     MY CHART []     Smart Device  []      HYPERBARIC TREATMENT-                TREATMENT #                          Your next appointment with the Wound Care Center is in 1 week                                                                                                   (Please note your next appointment above and if you are unable to keep, kindly give a 24 hour notice. Thank you.)  If more than 15 min late we cannot guarantee you will be seen due to clinician schedule  Per Policy, Excessive cancellation will call for dismissal from program.  If you experience any of the following, please call the Wound Care Center during business hours:  [phone]     * Increase in Pain  * Temperature over 101  * Increase in drainage from your wound  * Drainage with a foul odor  * Bleeding  * Increase in swelling  * Need for compression bandage changes due to slippage, breakthrough drainage.     If you need medical attention outside of the business hours of the Wound

## 2024-03-25 NOTE — DISCHARGE INSTRUCTIONS
[facility] WOUND and HYPERBARIC TREATMENT  CENTER                            Visit  Discharge Instructions / Physician Orders  DATE:3/28/24     Home Care:     SUPPLIES ORDERED THRU:     [employer]    DATE LAST SUPPLIED 1/15/24 (Yolie and Excel SAP 5x7 dressings ordered)     Wound Location:  Left Hip (surgery 1/5/2023/Dr. Ford)     Cleanse with: Liquid antibacterial soap and water, rinse well      Dressing Orders:  Primary dressing  Fibracol to wound    Secondary dressing  Cover with small piece of gauze and Silicone border dressing                         secure with           x 30days     Frequency:  Daily (may do every other day if not showering that day)     Additional Orders: Increase protein to diet (meat, cheese, eggs, fish, peanut butter, nuts and beans)  Multivitamin daily     OFFLOADING [] YES  TYPE:                  [x] NA     Weekly wound care visits until determined otherwise.     Antibiotic therapy-wound care related YES [] NO [] NA[x]     MY CHART []     Smart Device  []      HYPERBARIC TREATMENT-                TREATMENT #                          Your next appointment with the Wound Care Center is in 1 week                                                                                                   (Please note your next appointment above and if you are unable to keep, kindly give a 24 hour notice. Thank you.)  If more than 15 min late we cannot guarantee you will be seen due to clinician schedule  Per Policy, Excessive cancellation will call for dismissal from program.  If you experience any of the following, please call the Wound Care Center during business hours:  [phone]     * Increase in Pain  * Temperature over 101  * Increase in drainage from your wound  * Drainage with a foul odor  * Bleeding  * Increase in swelling  * Need for compression bandage changes due to slippage, breakthrough drainage.     If you need medical attention outside of the business hours of the Wound

## 2024-03-27 ENCOUNTER — CARE COORDINATION (OUTPATIENT)
Dept: OTHER | Facility: CLINIC | Age: 64
End: 2024-03-27

## 2024-03-27 NOTE — CARE COORDINATION
Ambulatory Care Coordination Note  3/27/2024    Patient Current Location:  [place]     ACM contacted the patient by telephone. Verified name and  with patient as identifiers.     Challenges to be reviewed by the provider   Additional needs identified to be addressed with provider: No  none         Method of communication with provider: none.    ACM: Ginny Hernandez RN    ACM contacted the patient to follow up on progress, discuss new issues or concerns, and reinforce/provide patient education.     Summary Note: Patient states he is doing well.  His finger is healing well.  It is now scabbing over.  He is still seeing wound care for his hip and his next appt is 3/28/24.  He has been approved to RTW and will be returning to work on 24 and he has submitted this to "[employer]" and his manager.  He will keep his finger covered during work and will discuss if there is anything specific he can do with wound care tomorrow.  He denies any new issues or concerns or ongoing ACM needs.     Reinforced/Provided Education:  Discussed red flags and appropriate site of care based on symptoms and resources available to patient including: PCP  Specialist  Benefits related nurse triage line  MyChart Messaging.     Importance and benefits of: Follow up with PCP and specialist, medication adherence, self monitoring and reporting of symptoms.      Plan:  No further follow-up call indicated based on severity of symptoms and risk factors.  ACM will sign off as patient has follow up appointments with providers scheduled/ completed compliant with medication regimen appropriate utilization of services no identified social determinants of health to be addressed.  Patient denies any ongoing ACM needs at this time and has ACM contact information for any future needs.        Care Coordination Interventions    Referral from Primary Care Provider: No  Suggested Interventions and Community Resources  Physical Therapy: Completed  Specialty Services

## 2024-03-28 ENCOUNTER — HOSPITAL ENCOUNTER (OUTPATIENT)
Dept: WOUND CARE | Age: 64
Discharge: HOME | End: 2024-03-28
Payer: COMMERCIAL

## 2024-03-28 VITALS
HEIGHT: 68 IN | HEART RATE: 82 BPM | SYSTOLIC BLOOD PRESSURE: 146 MMHG | BODY MASS INDEX: 47.74 KG/M2 | WEIGHT: 315 LBS | TEMPERATURE: 98.7 F | RESPIRATION RATE: 16 BRPM | DIASTOLIC BLOOD PRESSURE: 82 MMHG

## 2024-03-28 DIAGNOSIS — S71.009D: Primary | ICD-10-CM

## 2024-03-28 PROCEDURE — 11042 DBRDMT SUBQ TIS 1ST 20SQCM/<: CPT | Performed by: NURSE PRACTITIONER

## 2024-03-28 PROCEDURE — 11042 DBRDMT SUBQ TIS 1ST 20SQCM/<: CPT

## 2024-03-28 RX ORDER — BETAMETHASONE DIPROPIONATE 0.5 MG/G
CREAM TOPICAL ONCE
OUTPATIENT
Start: 2024-03-28 | End: 2024-03-28

## 2024-03-28 RX ORDER — BACITRACIN ZINC AND POLYMYXIN B SULFATE 500; 1000 [USP'U]/G; [USP'U]/G
OINTMENT TOPICAL ONCE
OUTPATIENT
Start: 2024-03-28 | End: 2024-03-28

## 2024-03-28 RX ORDER — LIDOCAINE HYDROCHLORIDE 40 MG/ML
SOLUTION TOPICAL ONCE
OUTPATIENT
Start: 2024-03-28 | End: 2024-03-28

## 2024-03-28 RX ORDER — LIDOCAINE HYDROCHLORIDE 40 MG/ML
SOLUTION TOPICAL ONCE
Status: COMPLETED | OUTPATIENT
Start: 2024-03-28 | End: 2024-03-28

## 2024-03-28 RX ORDER — LIDOCAINE HYDROCHLORIDE 20 MG/ML
JELLY TOPICAL ONCE
OUTPATIENT
Start: 2024-03-28 | End: 2024-03-28

## 2024-03-28 RX ORDER — TRIAMCINOLONE ACETONIDE 1 MG/G
OINTMENT TOPICAL ONCE
OUTPATIENT
Start: 2024-03-28 | End: 2024-03-28

## 2024-03-28 RX ORDER — LIDOCAINE 40 MG/G
CREAM TOPICAL ONCE
OUTPATIENT
Start: 2024-03-28 | End: 2024-03-28

## 2024-03-28 RX ORDER — IBUPROFEN 200 MG
TABLET ORAL ONCE
OUTPATIENT
Start: 2024-03-28 | End: 2024-03-28

## 2024-03-28 RX ORDER — CLOBETASOL PROPIONATE 0.5 MG/G
OINTMENT TOPICAL ONCE
OUTPATIENT
Start: 2024-03-28 | End: 2024-03-28

## 2024-03-28 RX ORDER — GINSENG 100 MG
CAPSULE ORAL ONCE
OUTPATIENT
Start: 2024-03-28 | End: 2024-03-28

## 2024-03-28 RX ORDER — LIDOCAINE 50 MG/G
OINTMENT TOPICAL ONCE
OUTPATIENT
Start: 2024-03-28 | End: 2024-03-28

## 2024-03-28 RX ORDER — SODIUM CHLOR/HYPOCHLOROUS ACID 0.033 %
SOLUTION, IRRIGATION IRRIGATION ONCE
OUTPATIENT
Start: 2024-03-28 | End: 2024-03-28

## 2024-03-28 RX ORDER — GENTAMICIN SULFATE 1 MG/G
OINTMENT TOPICAL ONCE
OUTPATIENT
Start: 2024-03-28 | End: 2024-03-28

## 2024-03-28 RX ADMIN — LIDOCAINE HYDROCHLORIDE 5 ML: 40 SOLUTION TOPICAL at 09:07

## 2024-03-28 ASSESSMENT — ENCOUNTER SYMPTOMS
NAUSEA: 0
DIARRHEA: 0
VOMITING: 0
COUGH: 0
RHINORRHEA: 0
SHORTNESS OF BREATH: 0

## 2024-03-28 NOTE — PROGRESS NOTES
[facility] Wound Care Center   Progress Note and Procedure Note      Norm Hopson  MEDICAL RECORD NUMBER:  [id number]  AGE: 63 y.o.   GENDER: male  : 1960  EPISODE DATE:  3/28/2024    Subjective:     Chief Complaint   Patient presents with    Wound Check     Left Hip         HISTORY of PRESENT ILLNESS HPI     Norm Hopson is a 63 y.o. male who presents today for wound/ulcer evaluation.   History of Wound Context: presents in follow up on left lateral hip wound. Had left total hip arthroplasty 2023. Wound has been open since 2024.   Wound/Ulcer Pain Timing/Severity: none  Quality of pain: N/A  Severity:  0 / 10   Modifying Factors: None  Associated Signs/Symptoms: none    Ulcer Identification:  Ulcer Type: non-healing surgical  Contributing Factors: diabetes and obesity         PAST MEDICAL HISTORY        Diagnosis Date    Abnormal EKG     BY HX    Arthritis     Bell palsy     Cardiomyopathy (HCC)     Chest pain     COVID-19 vaccine administered 2021,2020    Pfizer and has received Booster of Pfizer     Diabetes mellitus (HCC)     History of cardiac cath 10/2023    History of stress test 2023    Hyperlipidemia     Hypertension     Morbid obesity (HCC)     Sleep apnea     HAD SURGERY       PAST SURGICAL HISTORY    Past Surgical History:   Procedure Laterality Date    CARDIAC PROCEDURE N/A 10/30/2023    Left heart cath / coronary angiography performed by Mayank Hazel MD at [facility] CARDIAC CATH LAB    CARDIAC SURGERY      CARDIAC CATH  GREATER THAN 5 YRS AGO    CARPAL TUNNEL RELEASE Right     COLONOSCOPY      COLONOSCOPY N/A 2021    COLONOSCOPY POLYPECTOMY COLD BIOPSY performed by Kate Douglas MD at [facility] ENDO    ELBOW SURGERY Left 2016    lt lateral epicondylectomy    EYE SURGERY Right     FINGER AMPUTATION Right 2024    RIGHT SMALL FINGER AMPUTATION WITH FLAP CLOSURE performed by Hayder Monreal MD at [facility] OR    JOINT REPLACEMENT

## 2024-04-02 ENCOUNTER — OFFICE VISIT (OUTPATIENT)
Dept: ORTHOPEDIC SURGERY | Age: 64
End: 2024-04-02
Payer: COMMERCIAL

## 2024-04-02 VITALS — BODY MASS INDEX: 47.74 KG/M2 | WEIGHT: 315 LBS | HEIGHT: 68 IN | RESPIRATION RATE: 16 BRPM

## 2024-04-02 DIAGNOSIS — G89.29 CHRONIC MIDLINE LOW BACK PAIN WITHOUT SCIATICA: Primary | ICD-10-CM

## 2024-04-02 DIAGNOSIS — M48.062 LUMBAR STENOSIS WITH NEUROGENIC CLAUDICATION: ICD-10-CM

## 2024-04-02 DIAGNOSIS — M43.10 ACQUIRED SPONDYLOLISTHESIS: ICD-10-CM

## 2024-04-02 DIAGNOSIS — M54.50 CHRONIC MIDLINE LOW BACK PAIN WITHOUT SCIATICA: Primary | ICD-10-CM

## 2024-04-02 PROCEDURE — G8427 DOCREV CUR MEDS BY ELIG CLIN: HCPCS | Performed by: ORTHOPAEDIC SURGERY

## 2024-04-02 PROCEDURE — 1036F TOBACCO NON-USER: CPT | Performed by: ORTHOPAEDIC SURGERY

## 2024-04-02 PROCEDURE — G8417 CALC BMI ABV UP PARAM F/U: HCPCS | Performed by: ORTHOPAEDIC SURGERY

## 2024-04-02 PROCEDURE — 3017F COLORECTAL CA SCREEN DOC REV: CPT | Performed by: ORTHOPAEDIC SURGERY

## 2024-04-02 PROCEDURE — 99213 OFFICE O/P EST LOW 20 MIN: CPT | Performed by: ORTHOPAEDIC SURGERY

## 2024-04-02 SDOH — HEALTH STABILITY: PHYSICAL HEALTH: ON AVERAGE, HOW MANY DAYS PER WEEK DO YOU ENGAGE IN MODERATE TO STRENUOUS EXERCISE (LIKE A BRISK WALK)?: 0 DAYS

## 2024-04-02 NOTE — PROGRESS NOTES
Nilton Benavides, personally performed the services described in this documentation. All medical record entries made by the scribe were at my direction and in my presence. I have reviewed the chart and discharge instructions and agree that the records reflect my personal performance and is accurate and complete. Nilton Benavides MD 4/2/24       Scribe Attestation:  By signing my name below, I, Rick Romero, attest that this documentation has been prepared under the direction and in the presence of Dr. Nilton Benavides. Electronically signed: Jackie Bowser, 4/2/24     Please note that this chart was generated using voice recognition Dragon dictation software.  Although every effort was made to ensure the accuracy of this automated transcription, some errors in transcription may have occurred.   [facility]

## 2024-04-03 NOTE — DISCHARGE INSTRUCTIONS
[facility] WOUND and HYPERBARIC TREATMENT  CENTER                            Visit  Discharge Instructions / Physician Orders  DATE:4/4/24     Home Care:     SUPPLIES ORDERED THRU:     [employer]    DATE LAST SUPPLIED 1/15/24 (Yolie and Excel SAP 5x7 dressings ordered)     Wound Location:  Left Hip (surgery 1/5/2023/Dr. Ford)     Cleanse with: Liquid antibacterial soap and water, rinse well      Dressing Orders:  Primary dressing  Fibracol to wound    Secondary dressing  Cover with small piece of gauze and Silicone border dressing                         secure with           x 30days     Frequency:  Daily (may do every other day if not showering that day)     Additional Orders: Increase protein to diet (meat, cheese, eggs, fish, peanut butter, nuts and beans)  Multivitamin daily     OFFLOADING [] YES  TYPE:                  [x] NA     Weekly wound care visits until determined otherwise.     Antibiotic therapy-wound care related YES [] NO [] NA[x]     MY CHART []     Smart Device  []      HYPERBARIC TREATMENT-                TREATMENT #                          Your next appointment with the Wound Care Center is in 1 week                                                                                                   (Please note your next appointment above and if you are unable to keep, kindly give a 24 hour notice. Thank you.)  If more than 15 min late we cannot guarantee you will be seen due to clinician schedule  Per Policy, Excessive cancellation will call for dismissal from program.  If you experience any of the following, please call the Wound Care Center during business hours:  [phone]     * Increase in Pain  * Temperature over 101  * Increase in drainage from your wound  * Drainage with a foul odor  * Bleeding  * Increase in swelling  * Need for compression bandage changes due to slippage, breakthrough drainage.     If you need medical attention outside of the business hours of the Wound

## 2024-04-04 ENCOUNTER — HOSPITAL ENCOUNTER (OUTPATIENT)
Dept: WOUND CARE | Age: 64
Discharge: HOME | End: 2024-04-04

## 2024-04-04 ENCOUNTER — TELEPHONE (OUTPATIENT)
Dept: WOUND CARE | Age: 64
End: 2024-04-04

## 2024-04-04 NOTE — TELEPHONE ENCOUNTER
Patient's wife calling and canceling patient's [facility] wound care apt stating that he was ill this morning when he got up. Patient rescheduled for 4-11-24

## 2024-04-11 ENCOUNTER — HOSPITAL ENCOUNTER (OUTPATIENT)
Dept: WOUND CARE | Age: 64
Discharge: HOME | End: 2024-04-11
Payer: COMMERCIAL

## 2024-04-11 VITALS
RESPIRATION RATE: 20 BRPM | HEART RATE: 100 BPM | TEMPERATURE: 97.7 F | DIASTOLIC BLOOD PRESSURE: 90 MMHG | SYSTOLIC BLOOD PRESSURE: 142 MMHG

## 2024-04-11 DIAGNOSIS — S71.009D: Primary | ICD-10-CM

## 2024-04-11 PROCEDURE — 11042 DBRDMT SUBQ TIS 1ST 20SQCM/<: CPT

## 2024-04-11 PROCEDURE — 11042 DBRDMT SUBQ TIS 1ST 20SQCM/<: CPT | Performed by: NURSE PRACTITIONER

## 2024-04-11 RX ORDER — SODIUM CHLOR/HYPOCHLOROUS ACID 0.033 %
SOLUTION, IRRIGATION IRRIGATION ONCE
OUTPATIENT
Start: 2024-04-11 | End: 2024-04-11

## 2024-04-11 RX ORDER — BETAMETHASONE DIPROPIONATE 0.5 MG/G
CREAM TOPICAL ONCE
OUTPATIENT
Start: 2024-04-11 | End: 2024-04-11

## 2024-04-11 RX ORDER — IBUPROFEN 200 MG
TABLET ORAL ONCE
OUTPATIENT
Start: 2024-04-11 | End: 2024-04-11

## 2024-04-11 RX ORDER — CLOBETASOL PROPIONATE 0.5 MG/G
OINTMENT TOPICAL ONCE
OUTPATIENT
Start: 2024-04-11 | End: 2024-04-11

## 2024-04-11 RX ORDER — BACITRACIN ZINC AND POLYMYXIN B SULFATE 500; 1000 [USP'U]/G; [USP'U]/G
OINTMENT TOPICAL ONCE
OUTPATIENT
Start: 2024-04-11 | End: 2024-04-11

## 2024-04-11 RX ORDER — TRIAMCINOLONE ACETONIDE 1 MG/G
OINTMENT TOPICAL ONCE
OUTPATIENT
Start: 2024-04-11 | End: 2024-04-11

## 2024-04-11 RX ORDER — GINSENG 100 MG
CAPSULE ORAL ONCE
OUTPATIENT
Start: 2024-04-11 | End: 2024-04-11

## 2024-04-11 RX ORDER — LIDOCAINE HYDROCHLORIDE 20 MG/ML
JELLY TOPICAL ONCE
OUTPATIENT
Start: 2024-04-11 | End: 2024-04-11

## 2024-04-11 RX ORDER — LIDOCAINE 40 MG/G
CREAM TOPICAL ONCE
OUTPATIENT
Start: 2024-04-11 | End: 2024-04-11

## 2024-04-11 RX ORDER — LIDOCAINE 50 MG/G
OINTMENT TOPICAL ONCE
OUTPATIENT
Start: 2024-04-11 | End: 2024-04-11

## 2024-04-11 RX ORDER — LIDOCAINE HYDROCHLORIDE 40 MG/ML
SOLUTION TOPICAL ONCE
Status: COMPLETED | OUTPATIENT
Start: 2024-04-11 | End: 2024-04-11

## 2024-04-11 RX ORDER — GENTAMICIN SULFATE 1 MG/G
OINTMENT TOPICAL ONCE
OUTPATIENT
Start: 2024-04-11 | End: 2024-04-11

## 2024-04-11 RX ORDER — LIDOCAINE HYDROCHLORIDE 40 MG/ML
SOLUTION TOPICAL ONCE
OUTPATIENT
Start: 2024-04-11 | End: 2024-04-11

## 2024-04-11 RX ADMIN — LIDOCAINE HYDROCHLORIDE 5 ML: 40 SOLUTION TOPICAL at 09:10

## 2024-04-11 ASSESSMENT — ENCOUNTER SYMPTOMS
RHINORRHEA: 0
VOMITING: 0
COUGH: 0
NAUSEA: 0
DIARRHEA: 0
SHORTNESS OF BREATH: 0

## 2024-04-11 ASSESSMENT — PAIN SCALES - GENERAL: PAINLEVEL_OUTOF10: 0

## 2024-04-11 NOTE — PROGRESS NOTES
[facility] Wound Care Center   Progress Note and Procedure Note      Norm Hopson  MEDICAL RECORD NUMBER:  [id number]  AGE: 63 y.o.   GENDER: male  : 1960  EPISODE DATE:  2024    Subjective:     Chief Complaint   Patient presents with    Wound Check     Left hip         HISTORY of PRESENT ILLNESS HPI     Norm Hopson is a 63 y.o. male who presents today for wound/ulcer evaluation.   History of Wound Context: here to follow up on left lateral hip wound that is almost healed. He had left total hip arthroplasty 2023. Wound has been open since 2024.   Wound/Ulcer Pain Timing/Severity: none  Quality of pain: N/A  Severity:  0 / 10   Modifying Factors: None  Associated Signs/Symptoms: none    Ulcer Identification:  Ulcer Type: non-healing surgical  Contributing Factors: diabetes and obesity         PAST MEDICAL HISTORY        Diagnosis Date    Abnormal EKG     BY HX    Arthritis     Bell palsy     Cardiomyopathy (HCC)     Chest pain     COVID-19 vaccine administered 2021,2020    Pfizer and has received Booster of Pfizer     Diabetes mellitus (HCC)     History of cardiac cath 10/2023    History of stress test 2023    Hyperlipidemia     Hypertension     Morbid obesity (HCC)     Sleep apnea     HAD SURGERY       PAST SURGICAL HISTORY    Past Surgical History:   Procedure Laterality Date    CARDIAC PROCEDURE N/A 10/30/2023    Left heart cath / coronary angiography performed by Mayank Hazel MD at [facility] CARDIAC CATH LAB    CARDIAC SURGERY      CARDIAC CATH  GREATER THAN 5 YRS AGO    CARPAL TUNNEL RELEASE Right     COLONOSCOPY      COLONOSCOPY N/A 2021    COLONOSCOPY POLYPECTOMY COLD BIOPSY performed by Kate Douglas MD at [facility] ENDO    ELBOW SURGERY Left 2016    lt lateral epicondylectomy    EYE SURGERY Right     FINGER AMPUTATION Right 2024    RIGHT SMALL FINGER AMPUTATION WITH FLAP CLOSURE performed by Hayder Monreal MD at [facility]

## 2024-04-11 NOTE — DISCHARGE INSTRUCTIONS
[facility] WOUND and HYPERBARIC TREATMENT  CENTER                            Visit  Discharge Instructions / Physician Orders  DATE: 4/11/24     Home Care:     SUPPLIES ORDERED THRU:     [employer]    DATE LAST SUPPLIED 1/15/24 (Yolie and Excel SAP 5x7 dressings ordered)     Wound Location:  Left Hip (surgery 1/5/2023/Dr. Ford)     Cleanse with: Liquid antibacterial soap and water, rinse well      Dressing Orders:  Primary dressing  Fibracol to wound    Secondary dressing  Cover with small piece of gauze and Silicone border dressing                         secure with           x 30days     Frequency:  Daily (may do every other day if not showering that day)     Additional Orders: Increase protein to diet (meat, cheese, eggs, fish, peanut butter, nuts and beans)  Multivitamin daily     OFFLOADING [] YES  TYPE:                  [x] NA     Weekly wound care visits until determined otherwise.     Antibiotic therapy-wound care related YES [] NO [] NA[x]     MY CHART []     Smart Device  []      HYPERBARIC TREATMENT-                TREATMENT #                          Your next appointment with the Wound Care Center is in 2 weeks                                                                                                   (Please note your next appointment above and if you are unable to keep, kindly give a 24 hour notice. Thank you.)  If more than 15 min late we cannot guarantee you will be seen due to clinician schedule  Per Policy, Excessive cancellation will call for dismissal from program.  If you experience any of the following, please call the Wound Care Center during business hours:  [phone]     * Increase in Pain  * Temperature over 101  * Increase in drainage from your wound  * Drainage with a foul odor  * Bleeding  * Increase in swelling  * Need for compression bandage changes due to slippage, breakthrough drainage.     If you need medical attention outside of the business hours of the

## 2024-04-12 ENCOUNTER — HOSPITAL ENCOUNTER (EMERGENCY)
Age: 64
Discharge: HOME | End: 2024-04-12
Attending: EMERGENCY MEDICINE
Payer: COMMERCIAL

## 2024-04-12 VITALS
SYSTOLIC BLOOD PRESSURE: 153 MMHG | OXYGEN SATURATION: 97 % | DIASTOLIC BLOOD PRESSURE: 93 MMHG | RESPIRATION RATE: 18 BRPM | HEART RATE: 95 BPM | TEMPERATURE: 97.9 F

## 2024-04-12 DIAGNOSIS — L50.9 URTICARIA: Primary | ICD-10-CM

## 2024-04-12 PROCEDURE — 99283 EMERGENCY DEPT VISIT LOW MDM: CPT

## 2024-04-12 PROCEDURE — 6370000000 HC RX 637 (ALT 250 FOR IP)

## 2024-04-12 RX ORDER — PREDNISONE 20 MG/1
40 TABLET ORAL ONCE
Status: DISCONTINUED | OUTPATIENT
Start: 2024-04-12 | End: 2024-04-12

## 2024-04-12 RX ORDER — PREDNISONE 20 MG/1
40 TABLET ORAL DAILY
Qty: 8 TABLET | Refills: 0 | Status: SHIPPED | OUTPATIENT
Start: 2024-04-12 | End: 2024-04-16

## 2024-04-12 RX ORDER — PREDNISONE 20 MG/1
40 TABLET ORAL ONCE
Status: COMPLETED | OUTPATIENT
Start: 2024-04-12 | End: 2024-04-12

## 2024-04-12 RX ADMIN — PREDNISONE 40 MG: 20 TABLET ORAL at 03:05

## 2024-04-12 ASSESSMENT — ENCOUNTER SYMPTOMS
ABDOMINAL PAIN: 0
NAUSEA: 0
VOMITING: 0
SHORTNESS OF BREATH: 0
BLOOD IN STOOL: 0
WHEEZING: 0
TROUBLE SWALLOWING: 0
FACIAL SWELLING: 0
VOICE CHANGE: 0
COUGH: 0

## 2024-04-12 ASSESSMENT — PAIN - FUNCTIONAL ASSESSMENT: PAIN_FUNCTIONAL_ASSESSMENT: NONE - DENIES PAIN

## 2024-04-12 NOTE — DISCHARGE INSTRUCTIONS
You were seen in the emergency ferment for a rash.  This urticaria appears to be triggered by sweat as it is in locations where sweat accumulates.  Attempted decrease amount you are sweating by decreasing amount of time you spend in warm places.    You were placed on a steroid  burst.  Take this as prescribed.  Take 40 mg daily for 5 days.  You were given the first dose here in the emergency department, continue with the remaining 4 days.    Schedule appointment to be seen by the allergist soon as possible.  You likely require further more intensive treatment.    Return to the emergency room if you have any difficulty breathing, nausea/vomiting, fevers, if the rash spreads to the mouth or genitals, or any other acute medical concern.

## 2024-04-12 NOTE — ED PROVIDER NOTES
[facility]     Emergency Department     Faculty Note/ Attestation      Pt Name: Norm Hopson                                       MRN: [id number]  Birthdate 1960  Date of evaluation: 4/12/2024  Note Started: 2:39 AM EDT    Patients PCP:    Massimo Lam, DO    Attestation  I performed a history and physical examination of the patient and discussed management with the resident. I reviewed the resident’s note and agree with the documented findings and plan of care. Any areas of disagreement are noted on the chart. I was personally present for the key portions of any procedures. I have documented in the chart those procedures where I was not present during the key portions. I have reviewed the emergency nurses triage note. I agree with the chief complaint, past medical history, past surgical history, allergies, medications, social and family history as documented unless otherwise noted below.    For Physician Assistant/ Nurse Practitioner cases/documentation I have personally evaluated this patient and have completed at least one if not all key elements of the E/M (history, physical exam, and MDM). Additional findings are as noted.    Initial Screens:             Vitals:    Vitals:    04/12/24 0150   BP: (!) 153/93   Pulse: 95   Resp: 20   Temp: 97.9 °F (36.6 °C)   TempSrc: Oral   SpO2: 97%       CHIEF COMPLAINT       Chief Complaint   Patient presents with    Urticaria     Pt has been having outbreak of hives since surgery, seen allergist no allergies to anything, taking benadryl, Pepcid, and zyrtec     Post-op Problem     Rash after hip replacement      The pt is a 64 YO who arrives with concern for uticaria sees derm and has been intermittent since Dec.   The pt has it coming and going and takes home meds for this but typically has needed steroids when it gets bad.  The pt has no difficulty breathing speaking talking or swallowing no SOB      EMERGENCY DEPARTMENT COURSE:

## 2024-04-12 NOTE — ED NOTES
Pt presents to the ED with c/o of generalized rash/hives.   Pt states he has hx of hip replacement and since has intermittent episodes of generalized rash.   Pt states he has no known allergies, states he has not had any episodes of rash prior to surgery.   Pt states he has seen allergist, had all of the the appropriate testing done one and did not test positive for any allergies.   Pt states he feels an tinglings sensation prior to outbreak of hives.   Pt states he has been seen in the ED prior to arrival twice for this.   Pt states he is taking pepcid, benadryl 2x daily, and zyrtec without relief.

## 2024-04-12 NOTE — ED PROVIDER NOTES
wound.   Neurological:  Negative for headaches.     PHYSICAL EXAM      INITIAL VITALS:   BP (!) 153/93   Pulse 95   Temp 97.9 °F (36.6 °C) (Oral)   Resp 18   SpO2 97%     Physical Exam  Vitals reviewed.   Constitutional:       General: He is not in acute distress.     Appearance: He is obese. He is not ill-appearing, toxic-appearing or diaphoretic.   HENT:      Mouth/Throat:      Mouth: Mucous membranes are moist.      Comments: No oral lesions  Eyes:      General: No scleral icterus.     Extraocular Movements: Extraocular movements intact.      Pupils: Pupils are equal, round, and reactive to light.   Cardiovascular:      Rate and Rhythm: Normal rate and regular rhythm.      Pulses:           Radial pulses are 2+ on the right side and 2+ on the left side.   Pulmonary:      Effort: Pulmonary effort is normal. No respiratory distress.      Breath sounds: No wheezing.   Abdominal:      Palpations: Abdomen is soft.      Tenderness: There is no abdominal tenderness. There is no guarding.   Skin:     General: Skin is warm and dry.      Capillary Refill: Capillary refill takes less than 2 seconds.      Comments: Welts and macules scattered across the patient's body, appears to be in areas of high friction/sweat production.  Welts are most prominent in the bilateral anterior cubital fossae, along the neck, groin, under the armpits, and within folds on the patient's abdomen.   Neurological:      Mental Status: He is alert and oriented to person, place, and time.      GCS: GCS eye subscore is 4. GCS verbal subscore is 5. GCS motor subscore is 6.      Sensory: No sensory deficit.      Motor: No weakness.                 DDX/DIAGNOSTIC RESULTS / EMERGENCY DEPARTMENT COURSE / MDM     Medical Decision Making  63-year-old gentleman with history of diabetes and recent hip surgery presenting with welts across his body.  Patient has been to a dermatologist as well as an allergist for this.  He states that the only thing that  to   If symptoms worsen    Brayan Nam MD  [address]  [place] [address] [phone]    Schedule an appointment as soon as possible for a visit         DISCHARGE MEDICATIONS:  Discharge Medication List as of 4/12/2024  3:18 AM        START taking these medications    Details   predniSONE (DELTASONE) 20 MG tablet Take 2 tablets by mouth daily for 4 days, Disp-8 tablet, R-0Normal             Afshin Cain MD  Emergency Medicine Resident    (Please note that portions of this note were completed with a voice recognition program.  Efforts were made to edit the dictations but occasionally words are mis-transcribed.)

## 2024-04-17 ENCOUNTER — HOSPITAL ENCOUNTER (OUTPATIENT)
Dept: PAIN MANAGEMENT | Age: 64
Discharge: HOME | End: 2024-04-17
Payer: COMMERCIAL

## 2024-04-17 VITALS — HEIGHT: 67 IN | BODY MASS INDEX: 49.44 KG/M2 | WEIGHT: 315 LBS

## 2024-04-17 DIAGNOSIS — M51.36 LUMBAR DEGENERATIVE DISC DISEASE: ICD-10-CM

## 2024-04-17 DIAGNOSIS — M47.817 LUMBOSACRAL SPONDYLOSIS WITHOUT MYELOPATHY: Primary | ICD-10-CM

## 2024-04-17 DIAGNOSIS — E66.01 CLASS 3 SEVERE OBESITY WITH BODY MASS INDEX (BMI) OF 50.0 TO 59.9 IN ADULT, UNSPECIFIED OBESITY TYPE, UNSPECIFIED WHETHER SERIOUS COMORBIDITY PRESENT (HCC): ICD-10-CM

## 2024-04-17 PROBLEM — M51.369 LUMBAR DEGENERATIVE DISC DISEASE: Status: ACTIVE | Noted: 2024-04-17

## 2024-04-17 PROBLEM — E66.813 CLASS 3 SEVERE OBESITY WITH BODY MASS INDEX (BMI) OF 50.0 TO 59.9 IN ADULT: Status: ACTIVE | Noted: 2024-04-17

## 2024-04-17 PROCEDURE — 99204 OFFICE O/P NEW MOD 45 MIN: CPT | Performed by: STUDENT IN AN ORGANIZED HEALTH CARE EDUCATION/TRAINING PROGRAM

## 2024-04-17 PROCEDURE — 99203 OFFICE O/P NEW LOW 30 MIN: CPT

## 2024-04-17 ASSESSMENT — PAIN DESCRIPTION - LOCATION: LOCATION: BACK

## 2024-04-17 ASSESSMENT — PAIN DESCRIPTION - FREQUENCY: FREQUENCY: CONTINUOUS

## 2024-04-17 ASSESSMENT — PAIN DESCRIPTION - ORIENTATION: ORIENTATION: LOWER

## 2024-04-17 ASSESSMENT — PAIN DESCRIPTION - DESCRIPTORS: DESCRIPTORS: ACHING;BURNING;STABBING;SHOOTING

## 2024-04-17 ASSESSMENT — PAIN DESCRIPTION - PAIN TYPE: TYPE: CHRONIC PAIN

## 2024-04-17 NOTE — PROGRESS NOTES
Chronic Pain Clinic Note     Encounter Date: 2024     SUBJECTIVE:  Chief Complaint   Patient presents with    Back Pain    New Patient       History of Present Illness:   Norm Hopson is a 63 y.o. male who presents with low back Pain     Medication Refill: N/A     Current Complaints of Pain:   Location: Low back  Radiation: None  Severity: moderate   Pain Numerical Score - 8   Average: 8-10      Highest: 10  Lowest: 5   Character/Quality: Complains of pain that is burning, sharp, throbbing, and shooting   Timing: Constant  Associated symptoms: No  Numbness: No  Weakness: No  Exacerbating factors: always there and a all day thing   Alleviating factors: just had hip surgery done, saved medications from surgery Percocet and takes that to help with the pain, takes tylenol and aleve to help with the pain   Length of time pain has been present: Started on   Inciting event/injury: no   Bowel/Bladder incontinence: no   Falls: no   Physical Therapy: PT 2021 -2021    History of Interventions:   Surgery: No previous lumbar/cervical surgeries  Injections: None    Imagin2024 XR Lumbar Spine     Past Medical History:   Diagnosis Date    Abnormal EKG     BY HX    Arthritis     Bell palsy     Cardiomyopathy (HCC)     Chest pain     COVID-19 vaccine administered 2021,2020    Pfizer and has received Booster of Pfizer     Diabetes mellitus (HCC)     History of cardiac cath 10/2023    History of stress test 2023    Hyperlipidemia     Hypertension     Morbid obesity (HCC)     Sleep apnea     HAD SURGERY       Past Surgical History:   Procedure Laterality Date    CARDIAC PROCEDURE N/A 10/30/2023    Left heart cath / coronary angiography performed by Mayank Hazel MD at [facility] CARDIAC CATH LAB    CARDIAC SURGERY      CARDIAC CATH  GREATER THAN 5 YRS AGO    CARPAL TUNNEL RELEASE Right     COLONOSCOPY      COLONOSCOPY N/A 2021    COLONOSCOPY POLYPECTOMY COLD BIOPSY performed by Kate

## 2024-04-24 ENCOUNTER — FOLLOWUP TELEPHONE ENCOUNTER (OUTPATIENT)
Dept: WOUND CARE | Age: 64
End: 2024-04-24

## 2024-04-24 ENCOUNTER — TELEPHONE (OUTPATIENT)
Dept: ORTHOPEDIC SURGERY | Age: 64
End: 2024-04-24

## 2024-04-24 NOTE — TELEPHONE ENCOUNTER
I spoke with the patient's wife and she stated that Zuhair is doing well.  She also srates that his wound is closed.

## 2024-04-24 NOTE — PROGRESS NOTES
Pt's wife calling to cancel [facility] Wound care apt for tomorrow. She stated pt's wound is healed and he does not need to be seen.

## 2024-04-24 NOTE — DISCHARGE INSTRUCTIONS
[facility] WOUND and HYPERBARIC TREATMENT  CENTER                            Visit  Discharge Instructions / Physician Orders  DATE: 4/25/24     Home Care:     SUPPLIES ORDERED THRU:     [employer]    DATE LAST SUPPLIED 1/15/24 (Yolie and [employer] SAP 5x7 dressings ordered)     Wound Location:  Left Hip (surgery 1/5/2023/Dr. Ford)     Cleanse with: Liquid antibacterial soap and water, rinse well      Dressing Orders:  Primary dressing  Fibracol to wound    Secondary dressing  Cover with small piece of gauze and Silicone border dressing                         secure with           x 30days     Frequency:  Daily (may do every other day if not showering that day)     Additional Orders: Increase protein to diet (meat, cheese, eggs, fish, peanut butter, nuts and beans)  Multivitamin daily     OFFLOADING [] YES  TYPE:                  [x] NA     Weekly wound care visits until determined otherwise.     Antibiotic therapy-wound care related YES [] NO [] NA[x]     MY CHART []     Smart Device  []      HYPERBARIC TREATMENT-                TREATMENT #                          Your next appointment with the Wound Care Center is in 2 weeks                                                                                                   (Please note your next appointment above and if you are unable to keep, kindly give a 24 hour notice. Thank you.)  If more than 15 min late we cannot guarantee you will be seen due to clinician schedule  Per Policy, Excessive cancellation will call for dismissal from program.  If you experience any of the following, please call the Wound Care Center during business hours:  [phone]     * Increase in Pain  * Temperature over 101  * Increase in drainage from your wound  * Drainage with a foul odor  * Bleeding  * Increase in swelling  * Need for compression bandage changes due to slippage, breakthrough drainage.     If you need medical attention outside of the business hours of the

## 2024-04-25 ENCOUNTER — HOSPITAL ENCOUNTER (OUTPATIENT)
Dept: WOUND CARE | Age: 64
Discharge: HOME | End: 2024-04-25

## 2024-10-21 ENCOUNTER — TELEPHONE (OUTPATIENT)
Dept: ADMINISTRATIVE | Age: 64
End: 2024-10-21

## 2024-10-21 NOTE — TELEPHONE ENCOUNTER
Patient is requesting a medication refill on the following medications       predniSONE (DELTASONE) 10 MG tablet [[id number]     Pharmacy is [facility]

## 2024-10-22 DIAGNOSIS — M79.604 PAIN OF RIGHT LOWER EXTREMITY: Primary | ICD-10-CM

## 2024-10-22 RX ORDER — PREDNISONE 10 MG/1
10 TABLET ORAL DAILY
Qty: 10 TABLET | Refills: 0 | Status: SHIPPED | OUTPATIENT
Start: 2024-10-22 | End: 2024-11-01

## 2024-10-29 ENCOUNTER — HOSPITAL ENCOUNTER (OUTPATIENT)
Age: 64
Discharge: HOME | End: 2024-10-29
Payer: COMMERCIAL

## 2024-10-29 LAB
25(OH)D3 SERPL-MCNC: 18.2 NG/ML (ref 30–100)
ALBUMIN SERPL-MCNC: 4.6 G/DL (ref 3.5–5.2)
ALBUMIN/GLOB SERPL: 2 {RATIO} (ref 1–2.5)
ALP SERPL-CCNC: 72 U/L (ref 40–129)
ALT SERPL-CCNC: 26 U/L (ref 10–50)
ANION GAP SERPL CALCULATED.3IONS-SCNC: 10 MMOL/L (ref 9–16)
AST SERPL-CCNC: 27 U/L (ref 10–50)
BACTERIA URNS QL MICRO: NORMAL
BASOPHILS # BLD: <0.03 K/UL (ref 0–0.2)
BASOPHILS NFR BLD: 0 % (ref 0–2)
BILIRUB SERPL-MCNC: 0.4 MG/DL (ref 0–1.2)
BILIRUB UR QL STRIP: NEGATIVE
BUN SERPL-MCNC: 14 MG/DL (ref 8–23)
CALCIUM SERPL-MCNC: 9.8 MG/DL (ref 8.6–10.4)
CASTS #/AREA URNS LPF: NORMAL /LPF (ref 0–8)
CHLORIDE SERPL-SCNC: 100 MMOL/L (ref 98–107)
CHOLEST SERPL-MCNC: 134 MG/DL (ref 0–199)
CHOLESTEROL/HDL RATIO: 4
CK SERPL-CCNC: 220 U/L (ref 39–308)
CLARITY UR: CLEAR
CO2 SERPL-SCNC: 29 MMOL/L (ref 20–31)
COLOR UR: YELLOW
CREAT SERPL-MCNC: 0.9 MG/DL (ref 0.7–1.2)
CREAT UR-MCNC: 145 MG/DL (ref 39–259)
EOSINOPHIL # BLD: 0.11 K/UL (ref 0–0.44)
EOSINOPHILS RELATIVE PERCENT: 2 % (ref 1–4)
EPI CELLS #/AREA URNS HPF: NORMAL /HPF (ref 0–5)
ERYTHROCYTE [DISTWIDTH] IN BLOOD BY AUTOMATED COUNT: 16.3 % (ref 11.8–14.4)
EST. AVERAGE GLUCOSE BLD GHB EST-MCNC: 120 MG/DL
GFR, ESTIMATED: >90 ML/MIN/1.73M2
GLUCOSE SERPL-MCNC: 121 MG/DL (ref 74–99)
GLUCOSE UR STRIP-MCNC: NEGATIVE MG/DL
HBA1C MFR BLD: 5.8 % (ref 4–6)
HCT VFR BLD AUTO: 43 % (ref 40.7–50.3)
HDLC SERPL-MCNC: 37 MG/DL
HGB BLD-MCNC: 13.9 G/DL (ref 13–17)
HGB UR QL STRIP.AUTO: NEGATIVE
IMM GRANULOCYTES # BLD AUTO: <0.03 K/UL (ref 0–0.3)
IMM GRANULOCYTES NFR BLD: 0 %
KETONES UR STRIP-MCNC: NEGATIVE MG/DL
LDLC SERPL CALC-MCNC: 58 MG/DL (ref 0–100)
LEUKOCYTE ESTERASE UR QL STRIP: NEGATIVE
LYMPHOCYTES NFR BLD: 1.61 K/UL (ref 1.1–3.7)
LYMPHOCYTES RELATIVE PERCENT: 27 % (ref 24–43)
MAGNESIUM SERPL-MCNC: 2.1 MG/DL (ref 1.6–2.4)
MCH RBC QN AUTO: 28.1 PG (ref 25.2–33.5)
MCHC RBC AUTO-ENTMCNC: 32.3 G/DL (ref 28.4–34.8)
MCV RBC AUTO: 87 FL (ref 82.6–102.9)
MICROALBUMIN UR-MCNC: 99 MG/L (ref 0–20)
MICROALBUMIN/CREAT UR-RTO: 68 MCG/MG CREAT (ref 0–17)
MONOCYTES NFR BLD: 0.69 K/UL (ref 0.1–1.2)
MONOCYTES NFR BLD: 12 % (ref 3–12)
MYOGLOBIN SERPL-MCNC: 69 NG/ML (ref 28–72)
NEUTROPHILS NFR BLD: 59 % (ref 36–65)
NEUTS SEG NFR BLD: 3.5 K/UL (ref 1.5–8.1)
NITRITE UR QL STRIP: NEGATIVE
NRBC BLD-RTO: 0 PER 100 WBC
PH UR STRIP: 5.5 [PH] (ref 5–8)
PLATELET # BLD AUTO: 206 K/UL (ref 138–453)
PMV BLD AUTO: 9.4 FL (ref 8.1–13.5)
POTASSIUM SERPL-SCNC: 4 MMOL/L (ref 3.7–5.3)
PROT SERPL-MCNC: 7.4 G/DL (ref 6.6–8.7)
PROT UR STRIP-MCNC: ABNORMAL MG/DL
PSA FREE MFR SERPL: 20 %
PSA FREE SERPL-MCNC: <0.1 UG/L
PSA SERPL-MCNC: 0.2 NG/ML (ref 0–4)
RBC # BLD AUTO: 4.94 M/UL (ref 4.21–5.77)
RBC # BLD: ABNORMAL 10*6/UL
RBC #/AREA URNS HPF: NORMAL /HPF (ref 0–4)
SODIUM SERPL-SCNC: 139 MMOL/L (ref 136–145)
SP GR UR STRIP: 1.02 (ref 1–1.03)
T3FREE SERPL-MCNC: 3.2 PG/ML (ref 2–4.4)
T4 FREE SERPL-MCNC: 0.9 NG/DL (ref 0.92–1.68)
TRIGL SERPL-MCNC: 193 MG/DL
TSH SERPL DL<=0.05 MIU/L-ACNC: 4.37 UIU/ML (ref 0.27–4.2)
UROBILINOGEN UR STRIP-ACNC: NORMAL EU/DL (ref 0–1)
VLDLC SERPL CALC-MCNC: 39 MG/DL (ref 1–30)
WBC #/AREA URNS HPF: NORMAL /HPF (ref 0–5)
WBC OTHER # BLD: 6 K/UL (ref 3.5–11.3)

## 2024-10-29 PROCEDURE — 83036 HEMOGLOBIN GLYCOSYLATED A1C: CPT

## 2024-10-29 PROCEDURE — 82043 UR ALBUMIN QUANTITATIVE: CPT

## 2024-10-29 PROCEDURE — 82570 ASSAY OF URINE CREATININE: CPT

## 2024-10-29 PROCEDURE — 84439 ASSAY OF FREE THYROXINE: CPT

## 2024-10-29 PROCEDURE — 82550 ASSAY OF CK (CPK): CPT

## 2024-10-29 PROCEDURE — 84481 FREE ASSAY (FT-3): CPT

## 2024-10-29 PROCEDURE — 80061 LIPID PANEL: CPT

## 2024-10-29 PROCEDURE — 83874 ASSAY OF MYOGLOBIN: CPT

## 2024-10-29 PROCEDURE — 84154 ASSAY OF PSA FREE: CPT

## 2024-10-29 PROCEDURE — 80053 COMPREHEN METABOLIC PANEL: CPT

## 2024-10-29 PROCEDURE — 81001 URINALYSIS AUTO W/SCOPE: CPT

## 2024-10-29 PROCEDURE — 85025 COMPLETE CBC W/AUTO DIFF WBC: CPT

## 2024-10-29 PROCEDURE — 84443 ASSAY THYROID STIM HORMONE: CPT

## 2024-10-29 PROCEDURE — 36415 COLL VENOUS BLD VENIPUNCTURE: CPT

## 2024-10-29 PROCEDURE — 83735 ASSAY OF MAGNESIUM: CPT

## 2024-10-29 PROCEDURE — 82306 VITAMIN D 25 HYDROXY: CPT

## 2024-11-18 ENCOUNTER — HOSPITAL ENCOUNTER (OUTPATIENT)
Dept: ULTRASOUND IMAGING | Facility: CLINIC | Age: 64
Discharge: HOME | End: 2024-11-20
Attending: FAMILY MEDICINE
Payer: COMMERCIAL

## 2024-11-18 DIAGNOSIS — E03.9 HYPOTHYROIDISM, UNSPECIFIED TYPE: ICD-10-CM

## 2024-11-18 PROCEDURE — 76536 US EXAM OF HEAD AND NECK: CPT

## 2024-12-11 ENCOUNTER — TELEPHONE (OUTPATIENT)
Dept: INTERVENTIONAL RADIOLOGY/VASCULAR | Age: 64
End: 2024-12-11

## 2024-12-11 NOTE — TELEPHONE ENCOUNTER
IR received a request for a thyroid biopsy.  Dr Christiansen reviewed and said too subtle on the left.  Recommend follow up ultrasound instead of biopsy.

## 2024-12-21 ENCOUNTER — HOSPITAL ENCOUNTER (OUTPATIENT)
Age: 64
Discharge: HOME | End: 2024-12-21
Payer: COMMERCIAL

## 2024-12-21 LAB
T3FREE SERPL-MCNC: 3.63 PG/ML (ref 2–4.4)
T4 FREE SERPL-MCNC: 0.9 NG/DL (ref 0.92–1.68)
TSH SERPL DL<=0.05 MIU/L-ACNC: 5.58 UIU/ML (ref 0.27–4.2)

## 2024-12-21 PROCEDURE — 36415 COLL VENOUS BLD VENIPUNCTURE: CPT

## 2024-12-21 PROCEDURE — 84481 FREE ASSAY (FT-3): CPT

## 2024-12-21 PROCEDURE — 84443 ASSAY THYROID STIM HORMONE: CPT

## 2024-12-21 PROCEDURE — 84439 ASSAY OF FREE THYROXINE: CPT

## 2024-12-26 NOTE — DISCHARGE INSTRUCTIONS
the nearest emergency room.     The information contained in the After Visit Summary has been reviewed with me, the patient and/or responsible adult, by my health care provider(s). I had the opportunity to ask questions regarding this information. I have elected to receive;      []After Visit Summary  [x]Comprehensive Discharge Instruction      Patient signature______________________________________Date:________  Electronically signed by Rocío Coffman RN on 12/30/2024 at 9:38 AM   Electronically signed by GILDARDO JERRY - CNP on 12/30/2024 at 8:48 AM

## 2024-12-30 ENCOUNTER — HOSPITAL ENCOUNTER (OUTPATIENT)
Dept: WOUND CARE | Age: 64
Discharge: HOME | End: 2024-12-30
Payer: COMMERCIAL

## 2024-12-30 VITALS
HEIGHT: 68 IN | BODY MASS INDEX: 45.47 KG/M2 | TEMPERATURE: 97.9 F | HEART RATE: 78 BPM | RESPIRATION RATE: 20 BRPM | DIASTOLIC BLOOD PRESSURE: 99 MMHG | WEIGHT: 300 LBS | SYSTOLIC BLOOD PRESSURE: 171 MMHG

## 2024-12-30 DIAGNOSIS — S81.802D OPEN LEG WOUND, LEFT, SUBSEQUENT ENCOUNTER: Primary | ICD-10-CM

## 2024-12-30 DIAGNOSIS — I87.2 VENOUS INSUFFICIENCY OF BOTH LOWER EXTREMITIES: ICD-10-CM

## 2024-12-30 PROBLEM — E88.819 INSULIN RESISTANCE: Status: ACTIVE | Noted: 2024-12-30

## 2024-12-30 PROBLEM — R91.8 PULMONARY NODULES/LESIONS, MULTIPLE: Status: ACTIVE | Noted: 2024-12-30

## 2024-12-30 PROBLEM — R35.0 FREQUENCY OF URINATION: Status: ACTIVE | Noted: 2024-12-30

## 2024-12-30 PROBLEM — G89.29 OTHER CHRONIC PAIN: Status: ACTIVE | Noted: 2024-12-30

## 2024-12-30 PROBLEM — I86.1 VARICOCELE PRESENT ON ULTRASOUND OF SCROTUM: Status: ACTIVE | Noted: 2024-12-30

## 2024-12-30 PROBLEM — G47.33 OSA (OBSTRUCTIVE SLEEP APNEA): Status: ACTIVE | Noted: 2024-12-30

## 2024-12-30 PROBLEM — S71.009D: Status: RESOLVED | Noted: 2024-01-15 | Resolved: 2024-12-30

## 2024-12-30 PROBLEM — M25.512 PAIN IN LEFT SHOULDER: Status: ACTIVE | Noted: 2024-12-30

## 2024-12-30 PROBLEM — K76.0 FATTY LIVER: Status: ACTIVE | Noted: 2024-12-30

## 2024-12-30 PROBLEM — M43.16 SPONDYLOLISTHESIS AT L4-L5 LEVEL: Status: ACTIVE | Noted: 2024-12-30

## 2024-12-30 PROCEDURE — 99213 OFFICE O/P EST LOW 20 MIN: CPT

## 2024-12-30 PROCEDURE — 99203 OFFICE O/P NEW LOW 30 MIN: CPT | Performed by: NURSE PRACTITIONER

## 2024-12-30 PROCEDURE — 11042 DBRDMT SUBQ TIS 1ST 20SQCM/<: CPT

## 2024-12-30 PROCEDURE — 11042 DBRDMT SUBQ TIS 1ST 20SQCM/<: CPT | Performed by: NURSE PRACTITIONER

## 2024-12-30 RX ORDER — LIDOCAINE HYDROCHLORIDE 40 MG/ML
SOLUTION TOPICAL ONCE
Status: DISCONTINUED | OUTPATIENT
Start: 2024-12-30 | End: 2024-12-31 | Stop reason: HOSPADM

## 2024-12-30 RX ORDER — ATORVASTATIN CALCIUM 10 MG/1
10 TABLET, FILM COATED ORAL DAILY
COMMUNITY

## 2024-12-30 RX ORDER — LIDOCAINE HYDROCHLORIDE 20 MG/ML
JELLY TOPICAL ONCE
OUTPATIENT
Start: 2024-12-30 | End: 2024-12-30

## 2024-12-30 RX ORDER — LIDOCAINE HYDROCHLORIDE 40 MG/ML
SOLUTION TOPICAL ONCE
OUTPATIENT
Start: 2024-12-30 | End: 2024-12-30

## 2024-12-30 RX ORDER — CEPHALEXIN 500 MG/1
1 CAPSULE ORAL 3 TIMES DAILY
COMMUNITY
Start: 2024-12-18

## 2024-12-30 ASSESSMENT — PAIN SCALES - GENERAL: PAINLEVEL_OUTOF10: 5

## 2024-12-30 ASSESSMENT — PAIN DESCRIPTION - LOCATION: LOCATION: LEG

## 2024-12-30 ASSESSMENT — ENCOUNTER SYMPTOMS
NAUSEA: 0
DIARRHEA: 0
VOMITING: 0
SHORTNESS OF BREATH: 0
COUGH: 0
RHINORRHEA: 0

## 2024-12-30 ASSESSMENT — PAIN DESCRIPTION - ONSET: ONSET: ON-GOING

## 2024-12-30 ASSESSMENT — PAIN DESCRIPTION - ORIENTATION: ORIENTATION: LEFT

## 2024-12-30 ASSESSMENT — PAIN DESCRIPTION - PAIN TYPE: TYPE: CHRONIC PAIN

## 2024-12-30 ASSESSMENT — PAIN DESCRIPTION - DESCRIPTORS: DESCRIPTORS: BURNING

## 2024-12-30 ASSESSMENT — PAIN - FUNCTIONAL ASSESSMENT: PAIN_FUNCTIONAL_ASSESSMENT: ACTIVITIES ARE NOT PREVENTED

## 2024-12-30 ASSESSMENT — PAIN DESCRIPTION - FREQUENCY: FREQUENCY: CONTINUOUS

## 2024-12-30 NOTE — PLAN OF CARE
Problem: Pain  Goal: Verbalizes/displays adequate comfort level or baseline comfort level  Outcome: Progressing     Problem: Cognitive:  Goal: Knowledge of wound care  Description: Knowledge of wound care  Outcome: Progressing  Goal: Understands risk factors for wounds  Description: Understands risk factors for wounds  Outcome: Progressing     Problem: Wound:  Goal: Will show signs of wound healing; wound closure and no evidence of infection  Description: Will show signs of wound healing; wound closure and no evidence of infection  Outcome: Progressing     Problem: Venous:  Goal: Signs of wound healing will improve  Description: Signs of wound healing will improve  Outcome: Progressing     Problem: Falls - Risk of:  Goal: Will remain free from falls  Description: Will remain free from falls  Outcome: Progressing

## 2024-12-30 NOTE — PROGRESS NOTES
Multilayer Compression Wrap   (Not Unna) Below the Knee    NAME:  Norm Hopson  YOB: 1960  MEDICAL RECORD NUMBER:  [id number]  DATE:  12/30/2024    Multilayer compression wrap: Removed old Multilayer wrap if indicated and wash leg with mild soap/water.  Applied moisturizing agent to dry skin as needed.   Applied primary and secondary dressing as ordered.  Applied multilayered dressing below the knee to left lower leg.  Instructed patient/caregiver not to remove dressing and to keep it clean and dry.   Instructed patient/caregiver on complications to report to provider, such as pain, numbness in toes, heavy drainage, and slippage of dressing.  Instructed patient on purpose of compression dressing and on activity and exercise recommendations.      Electronically signed by Naomie Thibodeaux RN on 12/30/2024 at 9:58 AM  
injection Inject 0.3 mLs into the muscle once as needed (severe allergic reaction) 0.3 mL 1    Compression Stockings MISC 15-20 mm/hg 2 each 0     No current facility-administered medications on file prior to encounter.       REVIEW OF SYSTEMS    Review of Systems   Constitutional:  Negative for chills, fatigue and fever.   HENT:  Negative for congestion and rhinorrhea.    Respiratory:  Negative for cough and shortness of breath.    Cardiovascular:  Negative for chest pain and leg swelling.        Venous insufficiency     Gastrointestinal:  Negative for diarrhea, nausea and vomiting.        Obese   Musculoskeletal:  Negative for gait problem.   Skin:  Positive for wound (left lower posterior leg).   Allergic/Immunologic: Negative for immunocompromised state.   Neurological:  Negative for dizziness, syncope and weakness.   Psychiatric/Behavioral:  Negative for agitation. The patient is not nervous/anxious.        Objective:      BP (!) 171/99   Pulse 78   Temp 97.9 °F (36.6 °C) (Tympanic)   Resp 20   Ht 1.727 m (5' 8\")   Wt 136.1 kg (300 lb)   BMI 45.61 kg/m²     Wt Readings from Last 3 Encounters:   12/30/24 136.1 kg (300 lb)   04/17/24 (!) 149.7 kg (330 lb)   04/02/24 (!) 149.7 kg (330 lb 0.5 oz)       PHYSICAL EXAM    General Appearance: alert and oriented to person, place and time, well-developed and obese, in no acute distress  Skin: warm and dry, no rash or erythema, left lower posterior leg wound   Head: normocephalic and atraumatic  Eyes: pupils equal, round and conjunctivae normal  Pulmonary/Chest: normal air movement, no respiratory distress  Extremities: no cyanosis and no clubbing. Bilateral lower leg edema   Musculoskeletal: no joint swelling, deformity or tenderness  Neurologic: gait, coordination normal and speech normal      Assessment:     Problem List Items Addressed This Visit       Open leg wound, left, subsequent encounter - Primary    Relevant Medications    lidocaine (XYLOCAINE) 4 %

## 2025-01-07 NOTE — DISCHARGE INSTRUCTIONS
[facility] WOUND and HYPERBARIC TREATMENT  CENTER                            Visit  Discharge Instructions / Physician Orders  DATE:1/8/2025     Home Care:NA     SUPPLIES ORDERED THRU:  NA                   DATE LAST SUPPLIED     Wound Location:  Left lower leg     Cleanse with: Keep dry and intact     Dressing Orders:  Primary dressing   Fibracol Silvercel Kerramax to wound  then Calamine layer then 3 layer wrap     Frequency:  Keep dry and intact     Additional Orders: Increase protein to diet (meat, cheese, eggs, fish, peanut butter, nuts and beans)  Multivitamin daily  Elevate legs if swollen or wearing compression when sitting  OFFLOADING [] YES  TYPE:                  [x] NA     Weekly wound care visits until determined otherwise.     Antibiotic therapy-wound care related YES [x] NO [] NA[]  DRUG-    Keflex  500 mg 3 x per day    Finish up                      on (date) Per Dr. Jacob  MY CHART []     Smart Device  []      HYPERBARIC TREATMENT-                TREATMENT #                          Your next appointment with the Wound Care Center is in 1 week                                                                                                   (Please note your next appointment above and if you are unable to keep, kindly give a 24 hour notice. Thank you.)  If more than 15 min late we cannot guarantee you will be seen due to clinician schedule     Per Policy,  3 or more cancellations or no show may result in dismissal from program  If you experience any of the following, please call the Wound Care Center during business hours:  [phone]     * Increase in Pain  * Temperature over 101  * Increase in drainage from your wound  * Drainage with a foul odor  * Bleeding  * Increase in swelling  * Need for compression bandage changes due to slippage, breakthrough drainage.     If you need medical attention outside of the business hours of the Wound Care Centers please contact your PCP or go to the

## 2025-01-08 ENCOUNTER — HOSPITAL ENCOUNTER (OUTPATIENT)
Dept: WOUND CARE | Age: 65
Discharge: HOME | End: 2025-01-08
Payer: COMMERCIAL

## 2025-01-08 VITALS
DIASTOLIC BLOOD PRESSURE: 91 MMHG | HEIGHT: 68 IN | SYSTOLIC BLOOD PRESSURE: 137 MMHG | BODY MASS INDEX: 45.47 KG/M2 | WEIGHT: 300 LBS | TEMPERATURE: 97.3 F | HEART RATE: 71 BPM | RESPIRATION RATE: 20 BRPM

## 2025-01-08 DIAGNOSIS — S81.802D OPEN LEG WOUND, LEFT, SUBSEQUENT ENCOUNTER: Primary | ICD-10-CM

## 2025-01-08 DIAGNOSIS — I87.2 VENOUS INSUFFICIENCY OF BOTH LOWER EXTREMITIES: ICD-10-CM

## 2025-01-08 PROCEDURE — 11042 DBRDMT SUBQ TIS 1ST 20SQCM/<: CPT | Performed by: NURSE PRACTITIONER

## 2025-01-08 PROCEDURE — 11042 DBRDMT SUBQ TIS 1ST 20SQCM/<: CPT

## 2025-01-08 RX ORDER — LIDOCAINE HYDROCHLORIDE 20 MG/ML
JELLY TOPICAL ONCE
OUTPATIENT
Start: 2025-01-08 | End: 2025-01-08

## 2025-01-08 RX ORDER — LIDOCAINE HYDROCHLORIDE 40 MG/ML
SOLUTION TOPICAL ONCE
OUTPATIENT
Start: 2025-01-08 | End: 2025-01-08

## 2025-01-08 RX ORDER — LIDOCAINE HYDROCHLORIDE 40 MG/ML
SOLUTION TOPICAL ONCE
Status: COMPLETED | OUTPATIENT
Start: 2025-01-08 | End: 2025-01-08

## 2025-01-08 RX ADMIN — LIDOCAINE HYDROCHLORIDE 5 ML: 40 SOLUTION TOPICAL at 10:20

## 2025-01-08 ASSESSMENT — ENCOUNTER SYMPTOMS
SHORTNESS OF BREATH: 0
DIARRHEA: 0
NAUSEA: 0
VOMITING: 0
RHINORRHEA: 0
COUGH: 0

## 2025-01-08 ASSESSMENT — PAIN DESCRIPTION - ONSET: ONSET: ON-GOING

## 2025-01-08 ASSESSMENT — PAIN DESCRIPTION - FREQUENCY: FREQUENCY: CONTINUOUS

## 2025-01-08 ASSESSMENT — PAIN SCALES - GENERAL: PAINLEVEL_OUTOF10: 3

## 2025-01-08 ASSESSMENT — PAIN - FUNCTIONAL ASSESSMENT: PAIN_FUNCTIONAL_ASSESSMENT: ACTIVITIES ARE NOT PREVENTED

## 2025-01-08 ASSESSMENT — PAIN DESCRIPTION - PAIN TYPE: TYPE: CHRONIC PAIN

## 2025-01-08 ASSESSMENT — PAIN DESCRIPTION - LOCATION: LOCATION: LEG

## 2025-01-08 ASSESSMENT — PAIN DESCRIPTION - DESCRIPTORS: DESCRIPTORS: BURNING

## 2025-01-08 ASSESSMENT — PAIN DESCRIPTION - ORIENTATION: ORIENTATION: LEFT

## 2025-01-08 NOTE — PROGRESS NOTES
Multilayer Compression Wrap   (Not Unna) Below the Knee    NAME:  Norm Hopson  YOB: 1960  MEDICAL RECORD NUMBER:  [id number]  DATE:  1/8/2025       [x] Removed old Multilayer wrap if indicated and wash leg with mild soap/water.  [x] Applied moisturizing agent to dry skin as needed.   [x] Applied primary and secondary dressing as ordered   [x] Applied multilayered dressing below the knee to Left lower leg(s).    [x] Instructed patient/caregiver not to remove dressing and to keep it clean and dry.   [x] Instructed patient/caregiver on complications to report to provider, such as pain, numbness in toes, heavy drainage, and slippage of dressing.   [x] Instructed patient on purpose of compression dressing and on activity and exercise recommendations.    Electronically signed by Ignacio Aguirre RN on 1/8/2025 at 10:53 AM   
    KNEES    KNEE ARTHROSCOPY Bilateral     2-3x each    TOTAL HIP ARTHROPLASTY Left 12/12/2023    HIP TOTAL ARTHROPLASTY MINIMALLY INVASIVE LEFT ASI performed by Onofre Ford MD at [facility] OR    [facility] UVULOPALATOPHARYGOPLASTY      WISDOM TOOTH EXTRACTION         FAMILY HISTORY    Family History   Problem Relation Age of Onset    Heart Disease Mother     Diabetes Mother     Hypertension Mother     Hypertension Father        SOCIAL HISTORY    Social History     Tobacco Use    Smoking status: Never     Passive exposure: Never    Smokeless tobacco: Never   Vaping Use    Vaping status: Never Used   Substance Use Topics    Alcohol use: Yes     Comment: Socially    Drug use: No       ALLERGIES    No Known Allergies    MEDICATIONS    Current Outpatient Medications on File Prior to Encounter   Medication Sig Dispense Refill    cephALEXin (KEFLEX) 500 MG capsule Take 1 capsule by mouth 3 times daily      atorvastatin (LIPITOR) 10 MG tablet Take 1 tablet by mouth daily      montelukast (SINGULAIR) 10 MG tablet Take 1 tablet by mouth nightly      famotidine (PEPCID) 20 MG tablet Take 1 tablet by mouth 2 times daily for 5 days (Patient taking differently: Take 2 tablets by mouth 2 times daily) 10 tablet 0    aspirin 325 MG tablet Take 1 tablet by mouth daily 30 tablet 3    naproxen sodium (ALEVE) 220 MG tablet Take 1 tablet by mouth 2 times daily (with meals)      carvedilol (COREG) 6.25 MG tablet Take 1 tablet by mouth 2 times daily (with meals)      hydrochlorothiazide (HYDRODIURIL) 25 MG tablet Take 1 tablet by mouth daily      cetirizine (ZYRTEC) 10 MG tablet Take two tablets by mouth in the morning and one tablet at night. Can add an additional tablet at night if control is inadequate (Patient not taking: Reported on 12/30/2024) 120 tablet 0    EPINEPHrine (EPIPEN 2-KATLIN) 0.3 MG/0.3ML SOAJ injection Inject 0.3 mLs into the muscle once as needed (severe allergic reaction) 0.3 mL 1    Compression Stockings MISC 15-20 mm/hg 2

## 2025-01-14 NOTE — DISCHARGE INSTRUCTIONS
[facility] WOUND and HYPERBARIC TREATMENT  CENTER                            Visit  Discharge Instructions / Physician Orders  DATE:1/15/2025     Home Care:NA     SUPPLIES ORDERED THRU:  NA                   DATE LAST SUPPLIED     Wound Location:  Left lower leg     Cleanse with: Keep dry and intact     Dressing Orders:  Primary dressing   Fibracol Silvercel Kerramax to wound  then Calamine layer then 3 layer wrap     Frequency:  Keep dry and intact     Additional Orders: Increase protein to diet (meat, cheese, eggs, fish, peanut butter, nuts and beans)  Multivitamin daily  Elevate legs if swollen or wearing compression when sitting  OFFLOADING [] YES  TYPE:                  [x] NA     Weekly wound care visits until determined otherwise.     Antibiotic therapy-wound care related YES [x] NO [] NA[]  DRUG-    Keflex  500 mg 3 x per day    Finish up                      on (date) Per Dr. Jacob  MY CHART []     Smart Device  []      HYPERBARIC TREATMENT-                TREATMENT #                          Your next appointment with the Wound Care Center is in 1 week                                                                                                   (Please note your next appointment above and if you are unable to keep, kindly give a 24 hour notice. Thank you.)  If more than 15 min late we cannot guarantee you will be seen due to clinician schedule     Per Policy,  3 or more cancellations or no show may result in dismissal from program  If you experience any of the following, please call the Wound Care Center during business hours:  [phone]     * Increase in Pain  * Temperature over 101  * Increase in drainage from your wound  * Drainage with a foul odor  * Bleeding  * Increase in swelling  * Need for compression bandage changes due to slippage, breakthrough drainage.     If you need medical attention outside of the business hours of the Wound Care Centers please contact your PCP or go to the

## 2025-01-15 ENCOUNTER — HOSPITAL ENCOUNTER (OUTPATIENT)
Dept: WOUND CARE | Age: 65
Discharge: HOME | End: 2025-01-15
Payer: COMMERCIAL

## 2025-01-15 VITALS
BODY MASS INDEX: 45.47 KG/M2 | HEIGHT: 68 IN | SYSTOLIC BLOOD PRESSURE: 190 MMHG | WEIGHT: 300 LBS | RESPIRATION RATE: 18 BRPM | TEMPERATURE: 98.6 F | HEART RATE: 80 BPM | DIASTOLIC BLOOD PRESSURE: 110 MMHG

## 2025-01-15 DIAGNOSIS — S81.802D OPEN LEG WOUND, LEFT, SUBSEQUENT ENCOUNTER: Primary | ICD-10-CM

## 2025-01-15 DIAGNOSIS — I87.2 VENOUS INSUFFICIENCY OF BOTH LOWER EXTREMITIES: ICD-10-CM

## 2025-01-15 PROCEDURE — 11042 DBRDMT SUBQ TIS 1ST 20SQCM/<: CPT

## 2025-01-15 PROCEDURE — 11042 DBRDMT SUBQ TIS 1ST 20SQCM/<: CPT | Performed by: NURSE PRACTITIONER

## 2025-01-15 RX ORDER — LIDOCAINE HYDROCHLORIDE 20 MG/ML
JELLY TOPICAL ONCE
OUTPATIENT
Start: 2025-01-15 | End: 2025-01-15

## 2025-01-15 RX ORDER — LIDOCAINE HYDROCHLORIDE 40 MG/ML
SOLUTION TOPICAL ONCE
Status: COMPLETED | OUTPATIENT
Start: 2025-01-15 | End: 2025-01-15

## 2025-01-15 RX ORDER — LIDOCAINE HYDROCHLORIDE 40 MG/ML
SOLUTION TOPICAL ONCE
OUTPATIENT
Start: 2025-01-15 | End: 2025-01-15

## 2025-01-15 RX ADMIN — LIDOCAINE HYDROCHLORIDE 5 ML: 40 SOLUTION TOPICAL at 14:18

## 2025-01-15 ASSESSMENT — ENCOUNTER SYMPTOMS
VOMITING: 0
RHINORRHEA: 0
COUGH: 0
DIARRHEA: 0
SHORTNESS OF BREATH: 0
NAUSEA: 0

## 2025-01-15 ASSESSMENT — PAIN SCALES - GENERAL: PAINLEVEL_OUTOF10: 0

## 2025-01-15 NOTE — WOUND CARE
Multilayer Compression Wrap   (Not Unna) Below the Knee    NAME:  Norm Hopson  YOB: 1960  MEDICAL RECORD NUMBER:  [id number]  DATE:  1/15/2025    Multilayer compression wrap: Removed old Multilayer wrap if indicated and wash leg with mild soap/water.  Applied moisturizing agent to dry skin as needed.   Applied primary and secondary dressing as ordered.  Applied multilayered dressing below the knee to left lower leg.  Instructed patient/caregiver not to remove dressing and to keep it clean and dry.   Instructed patient/caregiver on complications to report to provider, such as pain, numbness in toes, heavy drainage, and slippage of dressing.  Instructed patient on purpose of compression dressing and on activity and exercise recommendations.    Fibracol Silvercel Kerramax to wound then Calamine layer then 3 layer wrap   Electronically signed by Maria D Grimes RN on 1/15/2025 at 2:58 PM

## 2025-01-15 NOTE — PROGRESS NOTES
[facility]   Progress Note and Procedure Note      Norm Hopson  MEDICAL RECORD NUMBER:  [id number]  AGE: 64 y.o.   GENDER: male  : 1960  EPISODE DATE:  1/15/2025    Subjective:     Chief Complaint   Patient presents with    Wound Check     Left Lower Extremity         HISTORY of PRESENT ILLNESS HPI     Norm Hopson is a 64 y.o. male who presents today for wound/ulcer evaluation.   History of Wound Context: presents in follow up on left lower leg wound that continues to slow.   Wound/Ulcer Pain Timing/Severity: intermittent  Quality of pain: sharp  Severity:  3 / 10   Modifying Factors: Pain worsens with touching  Associated Signs/Symptoms: edema and drainage    Ulcer Identification:  Ulcer Type: venous  Contributing Factors: edema, venous stasis, decreased mobility, and obesity         PAST MEDICAL HISTORY        Diagnosis Date    Abnormal EKG     BY HX    Arthritis     Bell palsy     Cardiomyopathy (HCC)     Chest pain     COVID-19 vaccine administered 2021,2020    Pfizer and has received Booster of Pfizer     Diabetes mellitus (HCC)     History of cardiac cath 10/2023    History of stress test 2023    Hyperlipidemia     Hypertension     Morbid obesity     Sleep apnea     HAD SURGERY       PAST SURGICAL HISTORY    Past Surgical History:   Procedure Laterality Date    CARDIAC PROCEDURE N/A 10/30/2023    Left heart cath / coronary angiography performed by Mayank Hazel MD at [facility] CARDIAC CATH LAB    CARDIAC SURGERY      CARDIAC CATH  GREATER THAN 5 YRS AGO    CARPAL TUNNEL RELEASE Right     COLONOSCOPY      COLONOSCOPY N/A 2021    COLONOSCOPY POLYPECTOMY COLD BIOPSY performed by Kate Douglas MD at [facility] ENDO    ELBOW SURGERY Left 2016    lt lateral epicondylectomy    EYE SURGERY Right     FINGER AMPUTATION Right 2024    RIGHT SMALL FINGER AMPUTATION WITH FLAP CLOSURE performed by Hayder Monreal MD at [facility] OR    JOINT  Rhabdomyolysis

## 2025-01-20 NOTE — DISCHARGE INSTRUCTIONS
go to the nearest emergency room.     The information contained in the After Visit Summary has been reviewed with me, the patient and/or responsible adult, by my health care provider(s). I had the opportunity to ask questions regarding this information. I have elected to receive;      []After Visit Summary  [x]Comprehensive Discharge Instruction        Patient signature______________________________________Date:________  Electronically signed by Rocío Coffman RN on 1/22/2025 at 11:22 AM  Electronically signed by GILDARDO JERRY - CNP on 1/22/2025 at 10:48 AM

## 2025-01-22 ENCOUNTER — HOSPITAL ENCOUNTER (OUTPATIENT)
Dept: WOUND CARE | Age: 65
Discharge: HOME | End: 2025-01-22
Payer: COMMERCIAL

## 2025-01-22 VITALS
HEIGHT: 68 IN | HEART RATE: 79 BPM | RESPIRATION RATE: 18 BRPM | SYSTOLIC BLOOD PRESSURE: 141 MMHG | DIASTOLIC BLOOD PRESSURE: 98 MMHG | TEMPERATURE: 97.7 F | BODY MASS INDEX: 45.47 KG/M2 | WEIGHT: 300 LBS

## 2025-01-22 DIAGNOSIS — S81.802D OPEN LEG WOUND, LEFT, SUBSEQUENT ENCOUNTER: Primary | ICD-10-CM

## 2025-01-22 DIAGNOSIS — I87.2 VENOUS INSUFFICIENCY OF BOTH LOWER EXTREMITIES: ICD-10-CM

## 2025-01-22 PROCEDURE — 11042 DBRDMT SUBQ TIS 1ST 20SQCM/<: CPT

## 2025-01-22 PROCEDURE — 11042 DBRDMT SUBQ TIS 1ST 20SQCM/<: CPT | Performed by: NURSE PRACTITIONER

## 2025-01-22 RX ORDER — OMEPRAZOLE 40 MG/1
40 CAPSULE, DELAYED RELEASE ORAL DAILY
COMMUNITY
Start: 2025-01-14

## 2025-01-22 RX ORDER — LIDOCAINE HYDROCHLORIDE 40 MG/ML
SOLUTION TOPICAL ONCE
OUTPATIENT
Start: 2025-01-22 | End: 2025-01-22

## 2025-01-22 RX ORDER — LIDOCAINE HYDROCHLORIDE 20 MG/ML
JELLY TOPICAL ONCE
OUTPATIENT
Start: 2025-01-22 | End: 2025-01-22

## 2025-01-22 RX ORDER — LIDOCAINE HYDROCHLORIDE 40 MG/ML
SOLUTION TOPICAL ONCE
Status: COMPLETED | OUTPATIENT
Start: 2025-01-22 | End: 2025-01-22

## 2025-01-22 RX ADMIN — LIDOCAINE HYDROCHLORIDE 5 ML: 40 SOLUTION TOPICAL at 11:14

## 2025-01-22 ASSESSMENT — ENCOUNTER SYMPTOMS
DIARRHEA: 0
NAUSEA: 0
RHINORRHEA: 0
SHORTNESS OF BREATH: 0
VOMITING: 0
COUGH: 0

## 2025-01-22 ASSESSMENT — PAIN SCALES - GENERAL: PAINLEVEL_OUTOF10: 0

## 2025-01-22 NOTE — PROGRESS NOTES
Multilayer Compression Wrap   (Not Unna) Below the Knee    NAME:  Norm Hopson  YOB: 1960  MEDICAL RECORD NUMBER:  [id number]  DATE:  1/22/2025    Multilayer compression wrap: Removed old Multilayer wrap if indicated and wash leg with mild soap/water.  Applied moisturizing agent to dry skin as needed.   Applied primary and secondary dressing as ordered.  Applied multilayered dressing below the knee to left lower leg.  Instructed patient/caregiver not to remove dressing and to keep it clean and dry.   Instructed patient/caregiver on complications to report to provider, such as pain, numbness in toes, heavy drainage, and slippage of dressing.  Instructed patient on purpose of compression dressing and on activity and exercise recommendations.      Electronically signed by Lynda Osborn RN on 1/22/2025 at 11:42 AM  
(severe allergic reaction) 0.3 mL 1    Compression Stockings MISC 15-20 mm/hg 2 each 0     No current facility-administered medications on file prior to encounter.       REVIEW OF SYSTEMS    Review of Systems   Constitutional:  Negative for chills, fatigue and fever.   HENT:  Negative for congestion and rhinorrhea.    Respiratory:  Negative for cough and shortness of breath.    Cardiovascular:  Negative for chest pain and leg swelling.        Venous insufficiency   Gastrointestinal:  Negative for diarrhea, nausea and vomiting.        Obese   Musculoskeletal:  Negative for gait problem.   Skin:  Positive for wound (left lower posterior leg).   Allergic/Immunologic: Negative for immunocompromised state.   Neurological:  Negative for dizziness, syncope and weakness.   Psychiatric/Behavioral:  Negative for agitation. The patient is not nervous/anxious.        Objective:      BP (!) 141/98   Pulse 79   Temp 97.7 °F (36.5 °C) (Tympanic)   Resp 18   Ht 1.727 m (5' 8\")   Wt 136.1 kg (300 lb)   BMI 45.61 kg/m²     Wt Readings from Last 3 Encounters:   01/22/25 136.1 kg (300 lb)   01/15/25 136.1 kg (300 lb)   01/08/25 136.1 kg (300 lb)       PHYSICAL EXAM    General Appearance: alert and oriented to person, place and time, well-developed and obese, in no acute distress  Skin: warm and dry, no rash or erythema, left lower leg wound   Head: normocephalic and atraumatic  Eyes: pupils equal, round and conjunctivae normal  Pulmonary/Chest: normal air movement, no respiratory distress  Extremities: no cyanosis and no clubbing. Bilateral lower leg edema   Musculoskeletal: no joint swelling, deformity or tenderness  Neurologic: gait, coordination normal and speech normal      Assessment:     Problem List Items Addressed This Visit       Open leg wound, left, subsequent encounter - Primary    Relevant Orders    Initiate Outpatient Wound Care Protocol    Venous insufficiency of both lower extremities    Relevant Orders

## 2025-01-22 NOTE — PLAN OF CARE
Problem: Pain  Goal: Verbalizes/displays adequate comfort level or baseline comfort level  Outcome: Progressing     Problem: Wound:  Goal: Will show signs of wound healing; wound closure and no evidence of infection  Description: Will show signs of wound healing; wound closure and no evidence of infection  Outcome: Progressing     Problem: Venous:  Goal: Signs of wound healing will improve  Description: Signs of wound healing will improve  Outcome: Progressing     Problem: Falls - Risk of:  Goal: Will remain free from falls  Description: Will remain free from falls  Outcome: Progressing

## 2025-01-28 NOTE — DISCHARGE INSTRUCTIONS
[facility] WOUND and HYPERBARIC TREATMENT  CENTER                            Visit  Discharge Instructions / Physician Orders  DATE:1/29/2025     Home Care:NA     SUPPLIES ORDERED THRU:  NA                   DATE LAST SUPPLIED     Wound Location:  Left lower leg     Cleanse with: Keep dry and intact     Dressing Orders:  Primary dressing  Endoform hydrafera blue ready transfer to wound  then Calamine layer then 3 layer wrap     Frequency:  Keep dry and intact     Additional Orders: Increase protein to diet (meat, cheese, eggs, fish, peanut butter, nuts and beans)  Multivitamin daily  Elevate legs if swollen or wearing compression when sitting  OFFLOADING [] YES  TYPE:                  [x] NA     Weekly wound care visits until determined otherwise.     Antibiotic therapy-wound care related YES [x] NO [] NA[]  DRUG-    Keflex  500 mg 3 x per day   Completed  MY CHART []     Smart Device  []      HYPERBARIC TREATMENT-                TREATMENT #                          Your next appointment with the Wound Care Center is in 1 week                                                                                                   (Please note your next appointment above and if you are unable to keep, kindly give a 24 hour notice. Thank you.)  If more than 15 min late we cannot guarantee you will be seen due to clinician schedule     Per Policy,  3 or more cancellations or no show may result in dismissal from program  If you experience any of the following, please call the Wound Care Center during business hours:  [phone]     * Increase in Pain  * Temperature over 101  * Increase in drainage from your wound  * Drainage with a foul odor  * Bleeding  * Increase in swelling  * Need for compression bandage changes due to slippage, breakthrough drainage.     If you need medical attention outside of the business hours of the Wound Care Centers please contact your PCP or go to the nearest emergency room.     The

## 2025-01-29 ENCOUNTER — HOSPITAL ENCOUNTER (OUTPATIENT)
Dept: WOUND CARE | Age: 65
Discharge: HOME | End: 2025-01-29
Payer: COMMERCIAL

## 2025-01-29 VITALS
SYSTOLIC BLOOD PRESSURE: 165 MMHG | DIASTOLIC BLOOD PRESSURE: 96 MMHG | RESPIRATION RATE: 17 BRPM | TEMPERATURE: 97.5 F | HEIGHT: 68 IN | HEART RATE: 74 BPM | BODY MASS INDEX: 45.47 KG/M2 | WEIGHT: 300 LBS

## 2025-01-29 DIAGNOSIS — S81.802D OPEN LEG WOUND, LEFT, SUBSEQUENT ENCOUNTER: Primary | ICD-10-CM

## 2025-01-29 DIAGNOSIS — I87.2 VENOUS INSUFFICIENCY OF BOTH LOWER EXTREMITIES: ICD-10-CM

## 2025-01-29 PROCEDURE — 11042 DBRDMT SUBQ TIS 1ST 20SQCM/<: CPT | Performed by: NURSE PRACTITIONER

## 2025-01-29 PROCEDURE — 11042 DBRDMT SUBQ TIS 1ST 20SQCM/<: CPT

## 2025-01-29 RX ORDER — LIDOCAINE HYDROCHLORIDE 40 MG/ML
SOLUTION TOPICAL ONCE
OUTPATIENT
Start: 2025-01-29 | End: 2025-01-29

## 2025-01-29 RX ORDER — LIDOCAINE HYDROCHLORIDE 20 MG/ML
JELLY TOPICAL ONCE
OUTPATIENT
Start: 2025-01-29 | End: 2025-01-29

## 2025-01-29 RX ORDER — LIDOCAINE HYDROCHLORIDE 40 MG/ML
SOLUTION TOPICAL ONCE
Status: COMPLETED | OUTPATIENT
Start: 2025-01-29 | End: 2025-01-29

## 2025-01-29 RX ADMIN — LIDOCAINE HYDROCHLORIDE 6 ML: 40 SOLUTION TOPICAL at 10:52

## 2025-01-29 ASSESSMENT — ENCOUNTER SYMPTOMS
NAUSEA: 0
VOMITING: 0
COUGH: 0
RHINORRHEA: 0
SHORTNESS OF BREATH: 0
DIARRHEA: 0

## 2025-01-29 ASSESSMENT — PAIN SCALES - GENERAL: PAINLEVEL_OUTOF10: 0

## 2025-01-29 NOTE — PROGRESS NOTES
[facility]   Progress Note and Procedure Note      Norm Hopson  MEDICAL RECORD NUMBER:  [id number]  AGE: 64 y.o.   GENDER: male  : 1960  EPISODE DATE:  2025    Subjective:     Chief Complaint   Patient presents with    Wound Check     Left lower leg         HISTORY of PRESENT ILLNESS HPI     Norm Hopson is a 64 y.o. male who presents today for wound/ulcer evaluation.   History of Wound Context: presents in follow up on left lower leg wound. No signs or symptoms of infection. Has done well with compression wrap.   Wound/Ulcer Pain Timing/Severity: none  Quality of pain: N/A  Severity:  0 / 10   Modifying Factors: None  Associated Signs/Symptoms: none    Ulcer Identification:  Ulcer Type: venous  Contributing Factors: edema, venous stasis, decreased mobility, and obesity         PAST MEDICAL HISTORY        Diagnosis Date    Abnormal EKG     BY HX    Arthritis     Bell palsy     Cardiomyopathy (HCC)     Chest pain     COVID-19 vaccine administered 2021,2020    Pfizer and has received Booster of Pfizer     Diabetes mellitus (HCC)     History of cardiac cath 10/2023    History of stress test 2023    Hyperlipidemia     Hypertension     Morbid obesity     Sleep apnea     HAD SURGERY       PAST SURGICAL HISTORY    Past Surgical History:   Procedure Laterality Date    CARDIAC PROCEDURE N/A 10/30/2023    Left heart cath / coronary angiography performed by Mayank Hazel MD at [facility] CARDIAC CATH LAB    CARDIAC SURGERY      CARDIAC CATH  GREATER THAN 5 YRS AGO    CARPAL TUNNEL RELEASE Right     COLONOSCOPY      COLONOSCOPY N/A 2021    COLONOSCOPY POLYPECTOMY COLD BIOPSY performed by Kate Douglas MD at [facility] ENDO    ELBOW SURGERY Left 2016    lt lateral epicondylectomy    EYE SURGERY Right     FINGER AMPUTATION Right 2024    RIGHT SMALL FINGER AMPUTATION WITH FLAP CLOSURE performed by Hayder Monreal MD at [facility] OR    JOINT

## 2025-01-29 NOTE — PROGRESS NOTES
Multilayer Compression Wrap   (Not Unna) Below the Knee    NAME:  Norm Hopson  YOB: 1960  MEDICAL RECORD NUMBER:  [id number]  DATE:  1/29/2025    Multilayer compression wrap: Removed old Multilayer wrap if indicated and wash leg with mild soap/water.  Applied moisturizing agent to dry skin as needed.   Applied primary and secondary dressing as ordered.  Applied multilayered dressing below the knee to left lower leg.  Instructed patient/caregiver not to remove dressing and to keep it clean and dry.   Instructed patient/caregiver on complications to report to provider, such as pain, numbness in toes, heavy drainage, and slippage of dressing.  Instructed patient on purpose of compression dressing and on activity and exercise recommendations.      Electronically signed by Lynda Osborn RN on 1/29/2025 at 11:55 AM

## 2025-02-02 NOTE — DISCHARGE INSTRUCTIONS
[facility] WOUND and HYPERBARIC TREATMENT  CENTER                            Visit  Discharge Instructions / Physician Orders  DATE:2/5/2025     Home Care:NA     SUPPLIES ORDERED THRU:  NA                   DATE LAST SUPPLIED     Wound Location:  Left lower leg     Cleanse with: Keep dry and intact     Dressing Orders:  Primary dressing  Endoform  then Calamine layer then 3 layer wrap     Frequency:  Keep dry and intact     Additional Orders: Increase protein to diet (meat, cheese, eggs, fish, peanut butter, nuts and beans)  Multivitamin daily  Elevate legs if swollen or wearing compression when sitting  OFFLOADING [] YES  TYPE:                  [x] NA     Weekly wound care visits until determined otherwise.     Antibiotic therapy-wound care related YES [x] NO [] NA[]  DRUG-    Keflex  500 mg 3 x per day   Completed  MY CHART []     Smart Device  []      HYPERBARIC TREATMENT-                TREATMENT #                          Your next appointment with the Wound Care Center is in 1 week                                                                                                   (Please note your next appointment above and if you are unable to keep, kindly give a 24 hour notice. Thank you.)  If more than 15 min late we cannot guarantee you will be seen due to clinician schedule     Per Policy,  3 or more cancellations or no show may result in dismissal from program  If you experience any of the following, please call the Wound Care Center during business hours:  [phone]     * Increase in Pain  * Temperature over 101  * Increase in drainage from your wound  * Drainage with a foul odor  * Bleeding  * Increase in swelling  * Need for compression bandage changes due to slippage, breakthrough drainage.     If you need medical attention outside of the business hours of the Wound Care Centers please contact your PCP or go to the nearest emergency room.     The information contained in the After Visit Summary

## 2025-02-05 ENCOUNTER — HOSPITAL ENCOUNTER (OUTPATIENT)
Dept: WOUND CARE | Age: 65
Discharge: HOME | End: 2025-02-05
Payer: COMMERCIAL

## 2025-02-05 VITALS
HEART RATE: 79 BPM | RESPIRATION RATE: 18 BRPM | BODY MASS INDEX: 45.47 KG/M2 | SYSTOLIC BLOOD PRESSURE: 146 MMHG | HEIGHT: 68 IN | TEMPERATURE: 97.3 F | DIASTOLIC BLOOD PRESSURE: 80 MMHG | WEIGHT: 300 LBS

## 2025-02-05 DIAGNOSIS — I87.2 VENOUS INSUFFICIENCY OF BOTH LOWER EXTREMITIES: ICD-10-CM

## 2025-02-05 DIAGNOSIS — S81.802D OPEN LEG WOUND, LEFT, SUBSEQUENT ENCOUNTER: Primary | ICD-10-CM

## 2025-02-05 PROCEDURE — 17250 CHEM CAUT OF GRANLTJ TISSUE: CPT

## 2025-02-05 PROCEDURE — 17250 CHEM CAUT OF GRANLTJ TISSUE: CPT | Performed by: NURSE PRACTITIONER

## 2025-02-05 RX ORDER — LIDOCAINE HYDROCHLORIDE 40 MG/ML
SOLUTION TOPICAL ONCE
OUTPATIENT
Start: 2025-02-05 | End: 2025-02-05

## 2025-02-05 RX ORDER — LIDOCAINE HYDROCHLORIDE 20 MG/ML
JELLY TOPICAL ONCE
OUTPATIENT
Start: 2025-02-05 | End: 2025-02-05

## 2025-02-05 RX ORDER — LIDOCAINE HYDROCHLORIDE 40 MG/ML
SOLUTION TOPICAL ONCE
Status: COMPLETED | OUTPATIENT
Start: 2025-02-05 | End: 2025-02-05

## 2025-02-05 RX ADMIN — LIDOCAINE HYDROCHLORIDE 5 ML: 40 SOLUTION TOPICAL at 09:39

## 2025-02-05 ASSESSMENT — ENCOUNTER SYMPTOMS
DIARRHEA: 0
VOMITING: 0
SHORTNESS OF BREATH: 0
COUGH: 0
RHINORRHEA: 0
NAUSEA: 0

## 2025-02-05 ASSESSMENT — PAIN SCALES - GENERAL: PAINLEVEL_OUTOF10: 0

## 2025-02-05 NOTE — PROGRESS NOTES
Multilayer Compression Wrap   (Not Unna) Below the Knee    NAME:  Norm Hopson  YOB: 1960  MEDICAL RECORD NUMBER:  [id number]  DATE:  2/5/2025       [x] Removed old Multilayer wrap if indicated and wash leg with mild soap/water.  [x] Applied moisturizing agent to dry skin as needed.   [x] Applied primary and secondary dressing as ordered   [x] Applied multilayered dressing below the knee to Left lower leg(s).    [x] Instructed patient/caregiver not to remove dressing and to keep it clean and dry.   [x] Instructed patient/caregiver on complications to report to provider, such as pain, numbness in toes, heavy drainage, and slippage of dressing.   [x] Instructed patient on purpose of compression dressing and on activity and exercise recommendations.    Electronically signed by Ignacio Aguirre RN on 2/5/2025 at 9:58 AM

## 2025-02-05 NOTE — PROGRESS NOTES
[facility]   Progress Note and Procedure Note      Norm Hopson  MEDICAL RECORD NUMBER:  [id number]  AGE: 64 y.o.   GENDER: male  : 1960  EPISODE DATE:  2025    Subjective:     Chief Complaint   Patient presents with    Wound Check     Left lower leg         HISTORY of PRESENT ILLNESS HPI     Norm Hopson is a 64 y.o. male who presents today for wound/ulcer evaluation.   History of Wound Context: here to follow up on left lower leg wound that is almost healed. No signs or symptoms of infection. Does well with compression wrap.   Wound/Ulcer Pain Timing/Severity: none  Quality of pain: N/A  Severity:  0 / 10   Modifying Factors: None  Associated Signs/Symptoms: none    Ulcer Identification:  Ulcer Type: venous  Contributing Factors: edema, venous stasis, decreased mobility, and obesity         PAST MEDICAL HISTORY        Diagnosis Date    Abnormal EKG     BY HX    Arthritis     Bell palsy     Cardiomyopathy (HCC)     Chest pain     COVID-19 vaccine administered 2021,2020    Pfizer and has received Booster of Pfizer     Diabetes mellitus (HCC)     History of cardiac cath 10/2023    History of stress test 2023    Hyperlipidemia     Hypertension     Morbid obesity     Sleep apnea     HAD SURGERY       PAST SURGICAL HISTORY    Past Surgical History:   Procedure Laterality Date    CARDIAC PROCEDURE N/A 10/30/2023    Left heart cath / coronary angiography performed by Mayank Hazel MD at [facility] CARDIAC CATH LAB    CARDIAC SURGERY      CARDIAC CATH  GREATER THAN 5 YRS AGO    CARPAL TUNNEL RELEASE Right     COLONOSCOPY      COLONOSCOPY N/A 2021    COLONOSCOPY POLYPECTOMY COLD BIOPSY performed by Kate Douglas MD at [facility] ENDO    ELBOW SURGERY Left 2016    lt lateral epicondylectomy    EYE SURGERY Right     FINGER AMPUTATION Right 2024    RIGHT SMALL FINGER AMPUTATION WITH FLAP CLOSURE performed by Hayder Monreal MD at [facility]

## 2025-02-12 ENCOUNTER — HOSPITAL ENCOUNTER (OUTPATIENT)
Dept: WOUND CARE | Age: 65
Discharge: HOME | End: 2025-02-12
Payer: COMMERCIAL

## 2025-02-12 VITALS
DIASTOLIC BLOOD PRESSURE: 77 MMHG | WEIGHT: 300 LBS | HEART RATE: 74 BPM | HEIGHT: 68 IN | BODY MASS INDEX: 45.47 KG/M2 | TEMPERATURE: 98.1 F | SYSTOLIC BLOOD PRESSURE: 152 MMHG | RESPIRATION RATE: 18 BRPM

## 2025-02-12 DIAGNOSIS — I87.2 VENOUS INSUFFICIENCY OF BOTH LOWER EXTREMITIES: ICD-10-CM

## 2025-02-12 DIAGNOSIS — S81.802D OPEN LEG WOUND, LEFT, SUBSEQUENT ENCOUNTER: Primary | ICD-10-CM

## 2025-02-12 PROCEDURE — 99212 OFFICE O/P EST SF 10 MIN: CPT

## 2025-02-12 PROCEDURE — 99212 OFFICE O/P EST SF 10 MIN: CPT | Performed by: NURSE PRACTITIONER

## 2025-02-12 RX ORDER — LIDOCAINE HYDROCHLORIDE 40 MG/ML
SOLUTION TOPICAL ONCE
Status: DISCONTINUED | OUTPATIENT
Start: 2025-02-12 | End: 2025-02-12

## 2025-02-12 RX ORDER — LIDOCAINE HYDROCHLORIDE 20 MG/ML
JELLY TOPICAL ONCE
Status: CANCELLED | OUTPATIENT
Start: 2025-02-12 | End: 2025-02-12

## 2025-02-12 RX ORDER — LIDOCAINE HYDROCHLORIDE 40 MG/ML
SOLUTION TOPICAL ONCE
Status: CANCELLED | OUTPATIENT
Start: 2025-02-12 | End: 2025-02-12

## 2025-02-12 ASSESSMENT — ENCOUNTER SYMPTOMS
DIARRHEA: 0
NAUSEA: 0
SHORTNESS OF BREATH: 0
RHINORRHEA: 0
COUGH: 0
VOMITING: 0

## 2025-02-12 ASSESSMENT — PAIN SCALES - GENERAL: PAINLEVEL_OUTOF10: 0

## 2025-02-12 NOTE — PROGRESS NOTES
JOINT REPLACEMENT Bilateral     KNEES    KNEE ARTHROSCOPY Bilateral     2-3x each    TOTAL HIP ARTHROPLASTY Left 12/12/2023    HIP TOTAL ARTHROPLASTY MINIMALLY INVASIVE LEFT ASI performed by Onofre Ford MD at [facility] OR    [facility] UVULOPALATOPHARYGOPLASTY      WISDOM TOOTH EXTRACTION         FAMILY HISTORY    Family History   Problem Relation Age of Onset    Heart Disease Mother     Diabetes Mother     Hypertension Mother     Hypertension Father        SOCIAL HISTORY    Social History     Tobacco Use    Smoking status: Never     Passive exposure: Never    Smokeless tobacco: Never   Vaping Use    Vaping status: Never Used   Substance Use Topics    Alcohol use: Yes     Comment: Socially    Drug use: No       ALLERGIES    No Known Allergies    MEDICATIONS    Current Outpatient Medications on File Prior to Encounter   Medication Sig Dispense Refill    omeprazole (PRILOSEC) 40 MG delayed release capsule Take 1 capsule by mouth daily      atorvastatin (LIPITOR) 10 MG tablet Take 1 tablet by mouth nightly      montelukast (SINGULAIR) 10 MG tablet Take 1 tablet by mouth nightly      aspirin 325 MG tablet Take 1 tablet by mouth daily 30 tablet 3    naproxen sodium (ALEVE) 220 MG tablet Take 1 tablet by mouth 2 times daily (with meals)      carvedilol (COREG) 6.25 MG tablet Take 1 tablet by mouth 2 times daily (with meals)      Compression Stockings MISC 15-20 mm/hg 2 each 0    hydrochlorothiazide (HYDRODIURIL) 25 MG tablet Take 1 tablet by mouth daily      cetirizine (ZYRTEC) 10 MG tablet Take two tablets by mouth in the morning and one tablet at night. Can add an additional tablet at night if control is inadequate (Patient not taking: Reported on 12/30/2024) 120 tablet 0    EPINEPHrine (EPIPEN 2-KATLIN) 0.3 MG/0.3ML SOAJ injection Inject 0.3 mLs into the muscle once as needed (severe allergic reaction) 0.3 mL 1    famotidine (PEPCID) 20 MG tablet Take 1 tablet by mouth 2 times daily for 5 days (Patient taking differently: Take

## 2025-02-12 NOTE — DISCHARGE INSTRUCTIONS
and/or responsible adult, by my health care provider(s). I had the opportunity to ask questions regarding this information. I have elected to receive;      []After Visit Summary  [x]Comprehensive Discharge Instruction        Patient signature______________________________________Date:______  Electronically signed by Rocío Coffman RN on 2/12/2025 at 10:17 AM  Electronically signed by GILDARDO JERRY - CNP on 2/12/2025 at 10:18 AM

## 2025-02-12 NOTE — PLAN OF CARE
Problem: Wound:  Goal: Will show signs of wound healing; wound closure and no evidence of infection  Description: Will show signs of wound healing; wound closure and no evidence of infection  Outcome: Completed     Problem: Venous:  Goal: Signs of wound healing will improve  Description: Signs of wound healing will improve  Outcome: Completed     Problem: Falls - Risk of:  Goal: Will remain free from falls  Description: Will remain free from falls  Outcome: Completed     Problem: Wound:  Goal: Will show signs of wound healing; wound closure and no evidence of infection  Description: Will show signs of wound healing; wound closure and no evidence of infection  Outcome: Completed     Problem: Venous:  Goal: Signs of wound healing will improve  Description: Signs of wound healing will improve  Outcome: Completed     Problem: Falls - Risk of:  Goal: Will remain free from falls  Description: Will remain free from falls  Outcome: Completed

## 2025-07-28 ENCOUNTER — HOSPITAL ENCOUNTER (OUTPATIENT)
Age: 65
Discharge: HOME | End: 2025-07-28
Payer: COMMERCIAL

## 2025-07-28 LAB
ANION GAP SERPL CALCULATED.3IONS-SCNC: 10 MMOL/L (ref 9–16)
BUN SERPL-MCNC: 12 MG/DL (ref 8–23)
CALCIUM SERPL-MCNC: 9.3 MG/DL (ref 8.6–10.4)
CHLORIDE SERPL-SCNC: 102 MMOL/L (ref 98–107)
CO2 SERPL-SCNC: 28 MMOL/L (ref 20–31)
CREAT SERPL-MCNC: 0.9 MG/DL (ref 0.7–1.2)
GFR, ESTIMATED: >90 ML/MIN/1.73M2
GLUCOSE SERPL-MCNC: 111 MG/DL (ref 74–99)
POTASSIUM SERPL-SCNC: 4.4 MMOL/L (ref 3.7–5.3)
SODIUM SERPL-SCNC: 140 MMOL/L (ref 136–145)

## 2025-07-28 PROCEDURE — 36415 COLL VENOUS BLD VENIPUNCTURE: CPT

## 2025-07-28 PROCEDURE — 80048 BASIC METABOLIC PNL TOTAL CA: CPT

## (undated) DEVICE — 3M™ IOBAN™ 2 ANTIMICROBIAL INCISE DRAPE 6651EZ: Brand: IOBAN™ 2

## (undated) DEVICE — GLOVE SURG SZ 85 L12IN FNGR THK79MIL GRN LTX FREE

## (undated) DEVICE — BAND COMPR L24CM REG CLR PLAS HEMSTAT EXT HK AND LOOP RETEN

## (undated) DEVICE — GLOVE ORANGE PI 7 1/2   MSG9075

## (undated) DEVICE — KIT SEP W/ BLD DRAW TB SYR NDL TRNQT PD

## (undated) DEVICE — CATHETER DIAG SM AD 6FR L100CM 0.038IN COR POLYUR JUDKINS L

## (undated) DEVICE — SOLUTION IRRIG 1000ML STRL H2O USP PLAS POUR BTL

## (undated) DEVICE — GLOVE ORTHO 8   MSG9480

## (undated) DEVICE — DRESSING PETRO W3XL3IN OIL EMUL N ADH GZ KNIT IMPREG CELOS

## (undated) DEVICE — DRESSING NEG PRSS 13CM PREVENA

## (undated) DEVICE — BANDAGE GZ W2XL75IN ST RAYON POLY CNFRM STRTCH LTWT

## (undated) DEVICE — ST CHARLES TOTAL HIP: Brand: MEDLINE INDUSTRIES, INC.

## (undated) DEVICE — SYSTEM TISS RETEN TRS

## (undated) DEVICE — ELECTRODE PT RET AD L9FT HI MOIST COND ADH HYDRGEL CORDED

## (undated) DEVICE — SOLUTION IRRIG 1000ML 0.9% SOD CHL USP POUR PLAS BTL

## (undated) DEVICE — DEFENDO AIR WATER SUCTION AND BIOPSY VALVE KIT FOR  OLYMPUS: Brand: DEFENDO AIR/WATER/SUCTION AND BIOPSY VALVE

## (undated) DEVICE — 2108 SERIES SAGITTAL BLADE, NO OFFSET  (24.8 X 1.32 X 87.3MM)

## (undated) DEVICE — SUTURE STRATAFIX SPRL MCRYL + SZ 2 0 L27IN ABSRB UD W NDL SXMP1B419

## (undated) DEVICE — NEEDLE HYPO 25GA L1.5IN BLU POLYPR HUB S STL REG BVL STR

## (undated) DEVICE — CATHETER ANGIO INFIN 038IN WLLM GRN

## (undated) DEVICE — SUTURE ETHBND EXCEL SZ 1 L30IN NONABSORBABLE GRN L36MM CT-1 X425H

## (undated) DEVICE — TOWEL,OR,DSP,ST,BLUE,STD,6/PK,12PK/CS: Brand: MEDLINE

## (undated) DEVICE — FORCEPS BX L240CM WRK CHN 2.8MM STD CAP W/ NDL MIC MESH

## (undated) DEVICE — PREMIUM DRY TRAY LF: Brand: MEDLINE INDUSTRIES, INC.

## (undated) DEVICE — SUTURE PROL SZ 3-0 L18IN NONABSORBABLE BLU L24MM FS-1 3/8 8684G

## (undated) DEVICE — SVMMC HND

## (undated) DEVICE — GOWN,SIRUS,NONRNF,SETINSLV,XL,20/CS: Brand: MEDLINE

## (undated) DEVICE — ILLUMINATOR SURG YANKAUER MTL TIP STRL PHOTONSABER Y DISP

## (undated) DEVICE — DRAPE,REIN 53X77,STERILE: Brand: MEDLINE

## (undated) DEVICE — 450 ML BOTTLE OF 0.05% CHLORHEXIDINE GLUCONATE IN 99.95% STERILE WATER FOR IRRIGATION, USP AND APPLICATOR.: Brand: IRRISEPT ANTIMICROBIAL WOUND LAVAGE

## (undated) DEVICE — 4-PORT MANIFOLD: Brand: NEPTUNE 2

## (undated) DEVICE — GUIDEWIRE VASC J 3 MM 0.035 INX210 CM FIX COR INQWIRE

## (undated) DEVICE — KIT AUTOTRNS APPL AERO 2 SET SYR 2 TIP FOR PLT SEP SYS GPS

## (undated) DEVICE — MHPB HAND AND FOOT PACK: Brand: MEDLINE INDUSTRIES, INC.

## (undated) DEVICE — SUTURE STRATAFIX SYMMETRIC PDS + SZ 1 L18IN ABSRB VLT L48MM SXPP1A400

## (undated) DEVICE — PENCIL ES L3M BTTN SWCH HOLSTER W/ BLDE ELECTRD EDGE

## (undated) DEVICE — GLIDESHEATH SLENDER STAINLESS STEEL KIT: Brand: GLIDESHEATH SLENDER

## (undated) DEVICE — BLADE ES L6IN ELASTOMERIC COAT EXT DURABLE BEND UPTO 90DEG

## (undated) DEVICE — CATHETER DIAG AD 6FR L100CM 0.038IN COR POLYUR AMPLATZ 1

## (undated) DEVICE — BLANKET WRM W29.9XL79.1IN UP BODY FORC AIR MISTRAL-AIR

## (undated) DEVICE — CATHETER DIAG AD 6FR L100CM 0.038IN COR POLYUR AMPLATZ 2

## (undated) DEVICE — PREVENA INCISION MANAGEMENT SYSTEM- PEEL & PLACE DRESSING: Brand: PREVENA™ PEEL & PLACE™

## (undated) DEVICE — DRESSING TRNSPAR W5XL4.5IN FLM SHT SEMIPERMEABLE WIND

## (undated) DEVICE — SYRINGE, LUER LOCK, 10ML: Brand: MEDLINE

## (undated) DEVICE — CATHETER DIAG AD 6FR L100CM 0.038IN STD COR POLYUR JUDKINS

## (undated) DEVICE — SOLUTION IRRIG 3000ML 0.9% SOD CHL USP UROMATIC PLAS CONT

## (undated) DEVICE — ENDO KIT W/SYRINGE: Brand: MEDLINE INDUSTRIES, INC.

## (undated) DEVICE — SOLUTION SCRB 4OZ 4% CHG H2O AIDED FOR PREOPERATIVE SKIN